# Patient Record
Sex: MALE | Race: BLACK OR AFRICAN AMERICAN | Employment: FULL TIME | URBAN - METROPOLITAN AREA
[De-identification: names, ages, dates, MRNs, and addresses within clinical notes are randomized per-mention and may not be internally consistent; named-entity substitution may affect disease eponyms.]

---

## 2017-01-11 ENCOUNTER — OFFICE VISIT (OUTPATIENT)
Dept: FAMILY MEDICINE CLINIC | Age: 57
End: 2017-01-11

## 2017-01-11 VITALS
RESPIRATION RATE: 18 BRPM | BODY MASS INDEX: 25.43 KG/M2 | SYSTOLIC BLOOD PRESSURE: 134 MMHG | HEIGHT: 66 IN | WEIGHT: 158.2 LBS | DIASTOLIC BLOOD PRESSURE: 85 MMHG | TEMPERATURE: 97.8 F | HEART RATE: 85 BPM

## 2017-01-11 DIAGNOSIS — R51.9 GENERALIZED HEADACHES: Primary | ICD-10-CM

## 2017-01-11 DIAGNOSIS — Z23 ENCOUNTER FOR IMMUNIZATION: ICD-10-CM

## 2017-01-11 RX ORDER — BUTALBITAL, ASPIRIN, AND CAFFEINE 325; 50; 40 MG/1; MG/1; MG/1
1 CAPSULE ORAL
Qty: 45 CAP | Refills: 0 | Status: SHIPPED | OUTPATIENT
Start: 2017-01-11 | End: 2017-09-28 | Stop reason: SDUPTHER

## 2017-01-11 NOTE — PROGRESS NOTES
Reviewed two most recent MRI results:  Please advise pt that a repeat MRI is not indicated at this time, if symptoms have changed then pt needs to be evaluated by neurology and an MRI will be ordered at the discretion of the specialist.     Please inform

## 2017-01-11 NOTE — MR AVS SNAPSHOT
Visit Information Date & Time Provider Department Dept. Phone Encounter #  
 1/11/2017 10:30 AM Tawny Gallegos MD [address] [facility] [phone] [id number] Upcoming Health Maintenance Date Due Hepatitis C Screening 1960 Pneumococcal 19-64 Highest Risk (1 of 3 - PCV13) 3/16/1978 DTaP/Tdap/Td series (1 - Tdap) 3/16/1980 FOBT Q 1 YEAR AGE 50-75 3/16/2009 INFLUENZA AGE 9 TO ADULT 8/1/2016 Allergies as of 1/11/2017  Review Complete On: 1/11/2017 By: Tawny Gallegos MD  
 No Known Allergies Current Immunizations  Reviewed on 11/3/2015 Name Date Influenza Vaccine Nuzhat Miu) 11/3/2015 Influenza Vaccine (Quad) PF  Incomplete Zoster Vaccine, Live 11/3/2015 Not reviewed this visit You Were Diagnosed With   
  
 Codes Comments Generalized headaches    -  Primary ICD-10-CM: Y07 ICD-9-CM: 784.0 Encounter for immunization     ICD-10-CM: S08 ICD-9-CM: V03.89 Vitals BP Pulse Temp Resp Height(growth percentile) Weight(growth percentile) 134/85 (BP 1 Location: Left arm, BP Patient Position: Sitting) 85 97.8 °F (36.6 °C) (Oral) 18 5' 6\" (1.676 m) 158 lb 3.2 oz (71.8 kg) BMI Smoking Status 25.53 kg/m2 Never Smoker Vitals History BMI and BSA Data Body Mass Index Body Surface Area 25.53 kg/m 2 1.83 m 2 Preferred Pharmacy Pharmacy Name Phone [id number] [facility] [address] [facility] [phone] Your Updated Medication List  
  
   
This list is accurate as of: 1/11/17 11:14 AM.  Always use your most recent med list.  
  
  
  
  
 butalbital-aspirin-caffeine capsule Commonly known as:  [place] Ronny Take 1 Cap by mouth every four (4) hours as needed for Pain. Max Daily Amount: 6 Caps.  
  
 losartan-hydroCHLOROthiazide 100-25 mg per tablet Commonly known as:  HYZAAR  
TAKE 1 TABLET BY MOUTH EVERY DAY  
  
 montelukast 10 mg tablet Commonly known as:  SINGULAIR Take 1 Tab by mouth daily. Prescriptions Printed Refills  
 butalbital-aspirin-caffeine (FIORINAL) capsule 0 Sig: Take 1 Cap by mouth every four (4) hours as needed for Pain. Max Daily Amount: 6 Caps. Class: Print Route: Oral  
  
We Performed the Following INFLUENZA VIRUS VAC QUAD,SPLIT,PRESV FREE SYRINGE 3/> YRS IM [id number] CPT(R)] RI IMMUNIZ ADMIN,1 SINGLE/COMB VAC/TOXOID [id number] CPT(R)] Introducing [facility] & [facility] SERVICES! Fermin Crenshaw introduces [employer] patient portal. Now you can access parts of your medical record, email your doctor's office, and request medication refills online. 1. In your internet browser, go to [email or web address] 2. Click on the First Time User? Click Here link in the Sign In box. You will see the New Member Sign Up page. 3. Enter your SimulScribe Access Code exactly as it appears below. You will not need to use this code after youve completed the sign-up process. If you do not sign up before the expiration date, you must request a new code. · SimulScribe Access Code: [email or web address] Expires: 4/11/2017 11:14 AM 
 
4. Enter the last four digits of your Social Security Number (xxxx) and Date of Birth (mm/dd/yyyy) as indicated and click Submit. You will be taken to the next sign-up page. 5. Create a SimulScribe ID. This will be your SimulScribe login ID and cannot be changed, so think of one that is secure and easy to remember. 6. Create a SimulScribe password. You can change your password at any time. 7. Enter your Password Reset Question and Answer. This can be used at a later time if you forget your password. 8. Enter your e-mail address. You will receive e-mail notification when new information is available in [address] 9. Click Sign Up. You can now view and download portions of your medical record. 10. Click the Download Summary menu link to download a portable copy of your medical information. If you have questions, please visit the Frequently Asked Questions section of the Enomal[employer] website. Remember, Suso is NOT to be used for urgent needs. For medical emergencies, dial 911. Now available from your iPhone and Android! Please provide this summary of care documentation to your next provider. Your primary care clinician is listed as Kevin Durham. If you have any questions after today's visit, please call [phone].

## 2017-01-11 NOTE — PROGRESS NOTES
Chief Complaint   Patient presents with    Headache    Immunization/Injection     Patient in office today with c/o of headaches lasting 6-8 days. Denies nausea/ vomiting. Patient requesting flu vaccination. 1. Have you been to the ER, urgent care clinic since your last visit? Hospitalized since your last visit? No    2. Have you seen or consulted any other health care providers outside of the [address] [place] [address] since your last visit? Include any pap smears or colon screening.  No

## 2017-01-11 NOTE — PROGRESS NOTES
Chief Complaint   Patient presents with    Headache    Immunization/Injection     Patient in office today with c/o of headaches lasting 6-8 days. Denies nausea/ vomiting. Patient requesting flu vaccination. Pt would like to repeat an MRI of his Head since headaches have returned, Pt reports that he had not had a headache for almost a year. Pt used to see neurology, reports that previous MRI was at [facility]. Subjective: (As above and below)     Chief Complaint   Patient presents with    Headache    Immunization/Injection     he is a 61y.o. year old male who presents for evaluation. Reviewed PmHx, RxHx, FmHx, SocHx, AllgHx and updated in chart. Review of Systems - negative except as listed above    Objective:     Vitals:    01/11/17 1044   BP: 134/85   Pulse: 85   Resp: 18   Temp: 97.8 °F (36.6 °C)   TempSrc: Oral   Weight: 158 lb 3.2 oz (71.8 kg)   Height: 5' 6\" (1.676 m)     Physical Examination: General appearance - alert, well appearing, and in no distress  Mental status - normal mood, behavior, speech, dress, motor activity, and thought processes  Eyes - pupils equal and reactive, extraocular eye movements intact  Mouth - mucous membranes moist, pharynx normal without lesions  Chest - clear to auscultation, no wheezes, rales or rhonchi, symmetric air entry  Heart - normal rate, regular rhythm, normal S1, S2, no murmurs, rubs, clicks or gallops  Neurological - cranial nerves II through XII intact, motor and sensory grossly normal bilaterally, normal muscle tone, no tremors, strength 5/5    Assessment/ Plan:   1. Generalized headaches  -refill medication for as needed use  -requested records of previous imaging    2.  Encounter for immunization  - Influenza virus vaccine (QUADRIVALENT PRES FREE SYRINGE) IM 3 years and older  - IA IMMUNIZ ADMIN,1 SINGLE/COMB VAC/TOXOID     Follow-up Disposition: As needed  I have discussed the diagnosis with the patient and the intended plan as seen in the above orders. The patient has received an after-visit summary and questions were answered concerning future plans.      Medication Side Effects and Warnings were discussed with patient: yes  Patient Labs were reviewed: yes  Patient Past Records were reviewed:  yes    Zachary Negron M.D.

## 2017-02-16 RX ORDER — LOSARTAN POTASSIUM AND HYDROCHLOROTHIAZIDE 25; 100 MG/1; MG/1
TABLET ORAL
Qty: 30 TAB | Refills: 2 | Status: SHIPPED | OUTPATIENT
Start: 2017-02-16 | End: 2017-03-20 | Stop reason: SDUPTHER

## 2017-03-20 RX ORDER — LOSARTAN POTASSIUM AND HYDROCHLOROTHIAZIDE 25; 100 MG/1; MG/1
TABLET ORAL
Qty: 90 TAB | Refills: 3 | Status: SHIPPED | OUTPATIENT
Start: 2017-03-20 | End: 2018-03-04 | Stop reason: SDUPTHER

## 2017-06-15 ENCOUNTER — OFFICE VISIT (OUTPATIENT)
Dept: FAMILY MEDICINE CLINIC | Age: 57
End: 2017-06-15

## 2017-06-15 VITALS
DIASTOLIC BLOOD PRESSURE: 96 MMHG | BODY MASS INDEX: 23.46 KG/M2 | OXYGEN SATURATION: 98 % | HEIGHT: 66 IN | HEART RATE: 75 BPM | TEMPERATURE: 98.1 F | RESPIRATION RATE: 18 BRPM | SYSTOLIC BLOOD PRESSURE: 158 MMHG | WEIGHT: 146 LBS

## 2017-06-15 DIAGNOSIS — I10 ESSENTIAL HYPERTENSION: Primary | ICD-10-CM

## 2017-06-15 DIAGNOSIS — R73.9 ELEVATED BLOOD SUGAR: ICD-10-CM

## 2017-06-15 RX ORDER — AMLODIPINE BESYLATE 10 MG/1
10 TABLET ORAL DAILY
Qty: 30 TAB | Refills: 5 | Status: SHIPPED | OUTPATIENT
Start: 2017-06-15 | End: 2017-08-02 | Stop reason: SDUPTHER

## 2017-06-15 NOTE — MR AVS SNAPSHOT
Visit Information Date & Time Provider Department Dept. Phone Encounter #  
 6/15/2017  7:45 AM Catherine Ellison MD [address] [facility] [phone] [id number] Upcoming Health Maintenance Date Due Hepatitis C Screening 1960 Pneumococcal 19-64 Highest Risk (1 of 3 - PCV13) 3/16/1978 DTaP/Tdap/Td series (1 - Tdap) 3/16/1980 INFLUENZA AGE 9 TO ADULT 8/1/2017 COLONOSCOPY 6/15/2019 Allergies as of 6/15/2017  Review Complete On: 6/15/2017 By: Catherine Ellison MD  
 No Known Allergies Current Immunizations  Reviewed on 1/11/2017 Name Date Influenza Vaccine Worthy Caroline) 11/3/2015 Influenza Vaccine (Quad) PF 1/11/2017 Zoster Vaccine, Live 11/3/2015 Not reviewed this visit You Were Diagnosed With   
  
 Codes Comments Essential hypertension    -  Primary ICD-10-CM: I10 
ICD-9-CM: 401.9 Elevated blood sugar     ICD-10-CM: R73.9 ICD-9-CM: 790.29 Vitals BP Pulse Temp Resp Height(growth percentile) Weight(growth percentile) (!) 158/96 (BP 1 Location: Left arm, BP Patient Position: Sitting) 75 98.1 °F (36.7 °C) (Oral) 18 5' 6\" (1.676 m) 146 lb (66.2 kg) SpO2 BMI Smoking Status 98% 23.57 kg/m2 Never Smoker Vitals History BMI and BSA Data Body Mass Index Body Surface Area  
 23.57 kg/m 2 1.76 m 2 Preferred Pharmacy Pharmacy Name Phone [id number], [address] [place] [phone] Your Updated Medication List  
  
   
This list is accurate as of: 6/15/17  8:05 AM.  Always use your most recent med list. amLODIPine 10 mg tablet Commonly known as:  Amy Million Take 1 Tab by mouth daily. butalbital-aspirin-caffeine capsule Commonly known as:  Bennye John Take 1 Cap by mouth every four (4) hours as needed for Pain. Max Daily Amount: 6 Caps.  
  
 losartan-hydroCHLOROthiazide 100-25 mg per tablet Commonly known as:  HYZAAR  
 TAKE 1 TABLET BY MOUTH EVERY DAY  
  
 montelukast 10 mg tablet Commonly known as:  SINGULAIR Take 1 Tab by mouth daily. Prescriptions Sent to Pharmacy Refills  
 amLODIPine (NORVASC) 10 mg tablet 5 Sig: Take 1 Tab by mouth daily. Class: Normal  
 Pharmacy: [facility] [address] Ph #: [phone] Route: Oral  
  
We Performed the Following CBC WITH AUTOMATED DIFF [91337 CPT(R)] HEMOGLOBIN A1C WITH EAG [46913 CPT(R)] HEPATITIS C AB [48239 CPT(R)] LIPID PANEL [87081 CPT(R)] METABOLIC PANEL, COMPREHENSIVE [73378 CPT(R)] TSH 3RD GENERATION [46004 CPT(R)] Introducing [facility] & HEALTH SERVICES! Tali Cullen introduces [employer] patient portal. Now you can access parts of your medical record, email your doctor's office, and request medication refills online. 1. In your internet browser, go to [email or web address] 2. Click on the First Time User? Click Here link in the Sign In box. You will see the New Member Sign Up page. 3. Enter your [employer] Access Code exactly as it appears below. You will not need to use this code after youve completed the sign-up process. If you do not sign up before the expiration date, you must request a new code. · [employer] Access Code: [email or web address] Expires: 9/13/2017  8:05 AM 
 
4. Enter the last four digits of your Social Security Number (xxxx) and Date of Birth (mm/dd/yyyy) as indicated and click Submit. You will be taken to the next sign-up page. 5. Create a [employer] ID. This will be your [employer] login ID and cannot be changed, so think of one that is secure and easy to remember. 6. Create a [employer] password. You can change your password at any time. 7. Enter your Password Reset Question and Answer. This can be used at a later time if you forget your password. 8. Enter your e-mail address.  You will receive e-mail notification when new information is available in Dotspin. 9. Click Sign Up. You can now view and download portions of your medical record. 10. Click the Download Summary menu link to download a portable copy of your medical information. If you have questions, please visit the Frequently Asked Questions section of the Dotspin website. Remember, Dotspin is NOT to be used for urgent needs. For medical emergencies, dial 911. Now available from your iPhone and Android! Please provide this summary of care documentation to your next provider. Your primary care clinician is listed as Kevin Durham. If you have any questions after today's visit, please call [phone].

## 2017-06-15 NOTE — PROGRESS NOTES
Pt here for BP check/medication evaluation. Reports that BP has been elevated x 2-3 weeks. States that he has been taking losartan-hctz daily. Subjective: (As above and below)     Chief Complaint   Patient presents with    Medication Evaluation     he is a 61y.o. year old male who presents for evaluation. Reviewed PmHx, RxHx, FmHx, SocHx, AllgHx and updated in chart. Review of Systems - negative except as listed above    Objective:     Vitals:    06/15/17 0743   BP: (!) 158/96   Pulse: 75   Resp: 18   Temp: 98.1 °F (36.7 °C)   TempSrc: Oral   SpO2: 98%   Weight: 146 lb (66.2 kg)   Height: 5' 6\" (1.676 m)     Physical Examination: General appearance - alert, well appearing, and in no distress  Mental status - normal mood, behavior, speech, dress, motor activity, and thought processes  Eyes - pupils equal and reactive, extraocular eye movements intact  Mouth - mucous membranes moist, pharynx normal without lesions  Chest - clear to auscultation, no wheezes, rales or rhonchi, symmetric air entry  Heart - normal rate, regular rhythm, normal S1, S2, no murmurs, rubs, clicks or gallops  Musculoskeletal - no joint tenderness, deformity or swelling    Assessment/ Plan:   1. Essential hypertension  -add amlodipine to losartan-HCTZ    2. Elevated blood sugar  -check fasting labs  - CBC WITH AUTOMATED DIFF  - LIPID PANEL  - METABOLIC PANEL, COMPREHENSIVE  - TSH 3RD GENERATION  - HEMOGLOBIN A1C WITH EAG     Follow-up Disposition: As needed  I have discussed the diagnosis with the patient and the intended plan as seen in the above orders. The patient has received an after-visit summary and questions were answered concerning future plans.      Medication Side Effects and Warnings were discussed with patient: yes  Patient Labs were reviewed: yes  Patient Past Records were reviewed:  yes    Boone Paulino M.D.

## 2017-06-15 NOTE — PROGRESS NOTES
Pt here for BP check/medication evaluation. Reports that BP has been elevated x 2-3 weeks. States that he has been taking losartan-hctz daily.

## 2017-06-16 LAB
ALBUMIN SERPL-MCNC: 4.3 G/DL (ref 3.5–5.5)
ALBUMIN/GLOB SERPL: 1.7 {RATIO} (ref 1.2–2.2)
ALP SERPL-CCNC: 91 IU/L (ref 39–117)
ALT SERPL-CCNC: 13 IU/L (ref 0–44)
AST SERPL-CCNC: 19 IU/L (ref 0–40)
BASOPHILS # BLD AUTO: 0 X10E3/UL (ref 0–0.2)
BASOPHILS NFR BLD AUTO: 0 %
BILIRUB SERPL-MCNC: 0.3 MG/DL (ref 0–1.2)
BUN SERPL-MCNC: 15 MG/DL (ref 6–24)
BUN/CREAT SERPL: 14 (ref 9–20)
CALCIUM SERPL-MCNC: 9.1 MG/DL (ref 8.7–10.2)
CHLORIDE SERPL-SCNC: 100 MMOL/L (ref 96–106)
CHOLEST SERPL-MCNC: 172 MG/DL (ref 100–199)
CO2 SERPL-SCNC: 26 MMOL/L (ref 18–29)
CREAT SERPL-MCNC: 1.11 MG/DL (ref 0.76–1.27)
EOSINOPHIL # BLD AUTO: 0.1 X10E3/UL (ref 0–0.4)
EOSINOPHIL NFR BLD AUTO: 2 %
ERYTHROCYTE [DISTWIDTH] IN BLOOD BY AUTOMATED COUNT: 14.7 % (ref 12.3–15.4)
EST. AVERAGE GLUCOSE BLD GHB EST-MCNC: 123 MG/DL
GLOBULIN SER CALC-MCNC: 2.5 G/DL (ref 1.5–4.5)
GLUCOSE SERPL-MCNC: 107 MG/DL (ref 65–99)
HBA1C MFR BLD: 5.9 % (ref 4.8–5.6)
HCT VFR BLD AUTO: 44.7 % (ref 37.5–51)
HCV AB S/CO SERPL IA: <0.1 S/CO RATIO (ref 0–0.9)
HDLC SERPL-MCNC: 62 MG/DL
HGB BLD-MCNC: 15.2 G/DL (ref 12.6–17.7)
IMM GRANULOCYTES # BLD: 0 X10E3/UL (ref 0–0.1)
IMM GRANULOCYTES NFR BLD: 0 %
INTERPRETATION, 910389: NORMAL
LDLC SERPL CALC-MCNC: 99 MG/DL (ref 0–99)
LYMPHOCYTES # BLD AUTO: 1.2 X10E3/UL (ref 0.7–3.1)
LYMPHOCYTES NFR BLD AUTO: 31 %
MCH RBC QN AUTO: 30 PG (ref 26.6–33)
MCHC RBC AUTO-ENTMCNC: 34 G/DL (ref 31.5–35.7)
MCV RBC AUTO: 88 FL (ref 79–97)
MONOCYTES # BLD AUTO: 0.3 X10E3/UL (ref 0.1–0.9)
MONOCYTES NFR BLD AUTO: 7 %
NEUTROPHILS # BLD AUTO: 2.4 X10E3/UL (ref 1.4–7)
NEUTROPHILS NFR BLD AUTO: 60 %
PLATELET # BLD AUTO: 255 X10E3/UL (ref 150–379)
POTASSIUM SERPL-SCNC: 4 MMOL/L (ref 3.5–5.2)
PROT SERPL-MCNC: 6.8 G/DL (ref 6–8.5)
RBC # BLD AUTO: 5.06 X10E6/UL (ref 4.14–5.8)
SODIUM SERPL-SCNC: 142 MMOL/L (ref 134–144)
TRIGL SERPL-MCNC: 55 MG/DL (ref 0–149)
TSH SERPL DL<=0.005 MIU/L-ACNC: 3.65 UIU/ML (ref 0.45–4.5)
VLDLC SERPL CALC-MCNC: 11 MG/DL (ref 5–40)
WBC # BLD AUTO: 4 X10E3/UL (ref 3.4–10.8)

## 2017-06-16 NOTE — PROGRESS NOTES
Increase in risk for diabetes, please closely monitor diet and increase exercise   Improved from last check one year ago. All other labs are within normal limits.    Please inform

## 2017-06-29 ENCOUNTER — OFFICE VISIT (OUTPATIENT)
Dept: FAMILY MEDICINE CLINIC | Age: 57
End: 2017-06-29

## 2017-06-29 VITALS
WEIGHT: 156.4 LBS | BODY MASS INDEX: 25.13 KG/M2 | TEMPERATURE: 98.4 F | SYSTOLIC BLOOD PRESSURE: 116 MMHG | OXYGEN SATURATION: 99 % | RESPIRATION RATE: 20 BRPM | HEIGHT: 66 IN | HEART RATE: 82 BPM | DIASTOLIC BLOOD PRESSURE: 80 MMHG

## 2017-06-29 DIAGNOSIS — G47.9 DIFFICULTY SLEEPING: ICD-10-CM

## 2017-06-29 DIAGNOSIS — I10 ESSENTIAL HYPERTENSION: Primary | ICD-10-CM

## 2017-06-29 NOTE — MR AVS SNAPSHOT
Visit Information Date & Time Provider Department Dept. Phone Encounter #  
 6/29/2017 11:10 AM Winferd Bloch, MD [address] [facility] [phone] [id number] Upcoming Health Maintenance Date Due Pneumococcal 19-64 Highest Risk (1 of 3 - PCV13) 3/16/1978 DTaP/Tdap/Td series (1 - Tdap) 3/16/1980 INFLUENZA AGE 9 TO ADULT 8/1/2017 COLONOSCOPY 6/15/2019 Allergies as of 6/29/2017  Review Complete On: 6/29/2017 By: Winferd Bloch, MD  
 No Known Allergies Current Immunizations  Reviewed on 1/11/2017 Name Date Influenza Vaccine Rogersononiel Hopkins) 11/3/2015 Influenza Vaccine (Quad) PF 1/11/2017 Zoster Vaccine, Live 11/3/2015 Not reviewed this visit You Were Diagnosed With   
  
 Codes Comments Essential hypertension    -  Primary ICD-10-CM: I10 
ICD-9-CM: 401.9 Difficulty sleeping     ICD-10-CM: G47.9 ICD-9-CM: 780.50 Vitals BP Pulse Temp Resp Height(growth percentile) Weight(growth percentile) 116/80 (BP 1 Location: Left arm, BP Patient Position: Sitting) 82 98.4 °F (36.9 °C) (Oral) 20 5' 6\" (1.676 m) 156 lb 6.4 oz (70.9 kg) SpO2 BMI Smoking Status 99% 25.24 kg/m2 Never Smoker Vitals History BMI and BSA Data Body Mass Index Body Surface Area  
 25.24 kg/m 2 1.82 m 2 Preferred Pharmacy Pharmacy Name Phone [id number] [place] [address] [facility] [phone] Your Updated Medication List  
  
   
This list is accurate as of: 6/29/17 11:41 AM.  Always use your most recent med list. amLODIPine 10 mg tablet Commonly known as:  Corinne Lees Take 1 Tab by mouth daily. butalbital-aspirin-caffeine capsule Commonly known as:  Donnal Amo Take 1 Cap by mouth every four (4) hours as needed for Pain. Max Daily Amount: 6 Caps.  
  
 losartan-hydroCHLOROthiazide 100-25 mg per tablet Commonly known as:  HYZAAR  
TAKE 1 TABLET BY MOUTH EVERY DAY  
  
 Introducing [facility] & HEALTH SERVICES! Jacy Kaufman introduces [employer] patient portal. Now you can access parts of your medical record, email your doctor's office, and request medication refills online. 1. In your internet browser, go to [email or web address] 2. Click on the First Time User? Click Here link in the Sign In box. You will see the New Member Sign Up page. 3. Enter your Z[employer] Access Code exactly as it appears below. You will not need to use this code after youve completed the sign-up process. If you do not sign up before the expiration date, you must request a new code. · [employer] Access Code: [email or web address] Expires: 9/13/2017  8:05 AM 
 
4. Enter the last four digits of your Social Security Number (xxxx) and Date of Birth (mm/dd/yyyy) as indicated and click Submit. You will be taken to the next sign-up page. 5. Create a ZoopShop ID. This will be your [employer] login ID and cannot be changed, so think of one that is secure and easy to remember. 6. Create a ZoopShop password. You can change your password at any time. 7. Enter your Password Reset Question and Answer. This can be used at a later time if you forget your password. 8. Enter your e-mail address. You will receive e-mail notification when new information is available in [address] 9. Click Sign Up. You can now view and download portions of your medical record. 10. Click the Download Summary menu link to download a portable copy of your medical information. If you have questions, please visit the Frequently Asked Questions section of the ZoopShop website. Remember, ZoopShop is NOT to be used for urgent needs. For medical emergencies, dial 911. Now available from your iPhone and Android! Please provide this summary of care documentation to your next provider. Your primary care clinician is listed as Kevin Durham. If you have any questions after today's visit, please call [phone].

## 2017-06-29 NOTE — PROGRESS NOTES
Chief Complaint   Patient presents with    Hypertension     2 week f/u    Diabetes      Patient seen in the office for DM and HTN 2 week follow up.

## 2017-06-29 NOTE — PROGRESS NOTES
Chief Complaint   Patient presents with    Hypertension     2 week f/u    Diabetes      Patient seen in the office for DM and HTN 2 week follow up. Pt reports that he has had difficulty sleeping, used to be on medication, does not want to be on medication now. Pt has had a sleep study. Subjective: (As above and below)     Chief Complaint   Patient presents with    Hypertension     2 week f/u    Diabetes     he is a 61y.o. year old male who presents for evaluation. Reviewed PmHx, RxHx, FmHx, SocHx, AllgHx and updated in chart. Review of Systems - negative except as listed above    Objective:     Vitals:    06/29/17 1116   BP: 116/80   Pulse: 82   Resp: 20   Temp: 98.4 °F (36.9 °C)   TempSrc: Oral   SpO2: 99%   Weight: 156 lb 6.4 oz (70.9 kg)   Height: 5' 6\" (1.676 m)     Physical Examination: General appearance - alert, well appearing, and in no distress  Mental status - normal mood, behavior, speech, dress, motor activity, and thought processes  Eyes - pupils equal and reactive, extraocular eye movements intact  Mouth - mucous membranes moist, pharynx normal without lesions  Chest - clear to auscultation, no wheezes, rales or rhonchi, symmetric air entry  Heart - normal rate, regular rhythm, normal S1, S2, no murmurs, rubs, clicks or gallops    Assessment/ Plan:   1. Essential hypertension  -greatly improved, continue on current medication    2. Difficulty sleeping  -pt plans to visit a sleep center in [facility] for a free consultation     Follow-up Disposition: As needed  I have discussed the diagnosis with the patient and the intended plan as seen in the above orders. The patient has received an after-visit summary and questions were answered concerning future plans.      Medication Side Effects and Warnings were discussed with patient: yes  Patient Labs were reviewed: yes  Patient Past Records were reviewed:  yes    Klaudia Segundo M.D.

## 2017-08-02 RX ORDER — AMLODIPINE BESYLATE 10 MG/1
10 TABLET ORAL DAILY
Qty: 90 TAB | Refills: 3 | Status: SHIPPED | OUTPATIENT
Start: 2017-08-02 | End: 2018-07-26 | Stop reason: SDUPTHER

## 2017-10-11 ENCOUNTER — OFFICE VISIT (OUTPATIENT)
Dept: FAMILY MEDICINE CLINIC | Age: 57
End: 2017-10-11

## 2017-10-11 VITALS
HEIGHT: 66 IN | TEMPERATURE: 98.5 F | BODY MASS INDEX: 24.91 KG/M2 | RESPIRATION RATE: 18 BRPM | DIASTOLIC BLOOD PRESSURE: 88 MMHG | OXYGEN SATURATION: 99 % | WEIGHT: 155 LBS | SYSTOLIC BLOOD PRESSURE: 134 MMHG | HEART RATE: 88 BPM

## 2017-10-11 DIAGNOSIS — J06.9 UPPER RESPIRATORY TRACT INFECTION, UNSPECIFIED TYPE: Primary | ICD-10-CM

## 2017-10-11 DIAGNOSIS — Z23 ENCOUNTER FOR IMMUNIZATION: ICD-10-CM

## 2017-10-11 RX ORDER — AZITHROMYCIN 250 MG/1
TABLET, FILM COATED ORAL
Qty: 6 TAB | Refills: 0 | Status: SHIPPED | OUTPATIENT
Start: 2017-10-11 | End: 2017-11-08 | Stop reason: SDUPTHER

## 2017-10-11 RX ORDER — CODEINE PHOSPHATE AND GUAIFENESIN 10; 100 MG/5ML; MG/5ML
5 SOLUTION ORAL
Qty: 180 ML | Refills: 0 | Status: SHIPPED | OUTPATIENT
Start: 2017-10-11 | End: 2018-06-22

## 2017-10-11 NOTE — PROGRESS NOTES
Pt here c/o ongoing cough and congestion x 5 days. Reports cough has been non productive. Pt has been taking Theraflu and Mucinex OTC.

## 2017-10-11 NOTE — MR AVS SNAPSHOT
Visit Information Date & Time Provider Department Dept. Phone Encounter #  
 10/11/2017  9:00 AM Sandhya Orozco MD [address] [facility] [phone] [id number] Upcoming Health Maintenance Date Due Pneumococcal 19-64 Highest Risk (1 of 3 - PCV13) 3/16/1978 DTaP/Tdap/Td series (1 - Tdap) 3/16/1980 INFLUENZA AGE 9 TO ADULT 8/1/2017 COLONOSCOPY 6/15/2019 Allergies as of 10/11/2017  Review Complete On: 10/11/2017 By: Sandhya Orozco MD  
 No Known Allergies Current Immunizations  Reviewed on 1/11/2017 Name Date Influenza Vaccine Seretha Cave) 11/3/2015 Influenza Vaccine (Quad) PF  Incomplete, 1/11/2017 Zoster Vaccine, Live 11/3/2015 Not reviewed this visit You Were Diagnosed With   
  
 Codes Comments Upper respiratory tract infection, unspecified type    -  Primary ICD-10-CM: J06.9 ICD-9-CM: 465.9 Encounter for immunization     ICD-10-CM: R45 ICD-9-CM: V03.89 Vitals BP Pulse Temp Resp Height(growth percentile) Weight(growth percentile) 134/88 (BP 1 Location: Right arm, BP Patient Position: Sitting) 88 98.5 °F (36.9 °C) (Oral) 18 5' 6\" (1.676 m) 155 lb (70.3 kg) SpO2 BMI Smoking Status 99% 25.02 kg/m2 Never Smoker Vitals History BMI and BSA Data Body Mass Index Body Surface Area 25.02 kg/m 2 1.81 m 2 Preferred Pharmacy Pharmacy Name Phone [id number], [facility] [phone] Your Updated Medication List  
  
   
This list is accurate as of: 10/11/17  9:20 AM.  Always use your most recent med list. amLODIPine 10 mg tablet Commonly known as:  Arlyss Glendive Take 1 Tab by mouth daily. azithromycin 250 mg tablet Commonly known as:  Kathi Ringgold Please take as directed  
  
 butalbital-aspirin-caffeine capsule Commonly known as:  FIORINAL  
TAKE ONE CAPSULE BY MOUTH EVERY 4 HOURS AS NEEDED FOR PAIN  
  
 guaiFENesin-codeine 100-10 mg/5 mL solution Commonly known as:  ROBITUSSIN AC Take 5 mL by mouth three (3) times daily as needed for Cough. Max Daily Amount: 15 mL. losartan-hydroCHLOROthiazide 100-25 mg per tablet Commonly known as:  HYZAAR  
TAKE 1 TABLET BY MOUTH EVERY DAY Prescriptions Printed Refills  
 guaiFENesin-codeine (ROBITUSSIN AC) 100-10 mg/5 mL solution 0 Sig: Take 5 mL by mouth three (3) times daily as needed for Cough. Max Daily Amount: 15 mL. Class: Print Route: Oral  
  
Prescriptions Sent to Pharmacy Refills  
 azithromycin (ZITHROMAX) 250 mg tablet 0 Sig: Please take as directed Class: Normal  
 Pharmacy: [address], [address] Ph #: [phone] We Performed the Following INFLUENZA VIRUS VAC QUAD,SPLIT,PRESV FREE SYRINGE IM [id number] CPT(R)] AL IMMUNIZ ADMIN,1 SINGLE/COMB VAC/TOXOID [id number] CPT(R)] Introducing [facility] & HEALTH SERVICES! [employer] Brandon introduces Hello Agent patient portal. Now you can access parts of your medical record, email your doctor's office, and request medication refills online. 1. In your internet browser, go to [email or web address] 2. Click on the First Time User? Click Here link in the Sign In box. You will see the New Member Sign Up page. 3. Enter your Hello Agent Access Code exactly as it appears below. You will not need to use this code after youve completed the sign-up process. If you do not sign up before the expiration date, you must request a new code. · Hello Agent Access Code: [email or web address] Expires: 1/9/2018  9:20 AM 
 
4. Enter the last four digits of your Social Security Number (xxxx) and Date of Birth (mm/dd/yyyy) as indicated and click Submit. You will be taken to the next sign-up page. 5. Create a Hello Agent ID. This will be your Hello Agent login ID and cannot be changed, so think of one that is secure and easy to remember. 6. Create a Gousto password. You can change your password at any time. 7. Enter your Password Reset Question and Answer. This can be used at a later time if you forget your password. 8. Enter your e-mail address. You will receive e-mail notification when new information is available in [address] 9. Click Sign Up. You can now view and download portions of your medical record. 10. Click the Download Summary menu link to download a portable copy of your medical information. If you have questions, please visit the Frequently Asked Questions section of the Gousto website. Remember, Gousto is NOT to be used for urgent needs. For medical emergencies, dial 911. Now available from your iPhone and Android! Please provide this summary of care documentation to your next provider. Your primary care clinician is listed as Kevin Durham. If you have any questions after today's visit, please call [phone].

## 2017-10-11 NOTE — PROGRESS NOTES
Subjective:   Jesus Ibarra is a 61 y.o. male who complains of congestion, dry cough and generalized sinus pain for 5 days, gradually worsening since that time. He denies a history of shortness of breath and wheezing. Evaluation to date: none. Treatment to date: OTC products. Patient does not smoke cigarettes. Relevant PMH: No pertinent additional PMH. Patient Active Problem List   Diagnosis Code    [facility]    Essential hypertension I10     No Known Allergies     Review of Systems  Pertinent items are noted in HPI. Objective:     Visit Vitals    /88 (BP 1 Location: Right arm, BP Patient Position: Sitting)    Pulse 88    Temp 98.5 °F (36.9 °C) (Oral)    Resp 18    Ht 5' 6\" (1.676 m)    Wt 155 lb (70.3 kg)    SpO2 99%    BMI 25.02 kg/m2     General:  alert, cooperative, no distress   Eyes: conjunctivae/corneas clear. PERRL, EOM's intact. Fundi benign   Ears: normal TM's and external ear canals AU   Sinuses: tenderness over generalized maxillary   Mouth:  Lips, mucosa, and tongue normal. Teeth and gums normal   Neck: supple, symmetrical, trachea midline and no adenopathy. Heart: S1 and S2 normal, no murmurs noted. Lungs: wheezes R apex   Abdomen: soft, non-tender. Bowel sounds normal. No masses,  no organomegaly        Assessment/Plan:   viral upper respiratory illness  Suggested symptomatic OTC remedies. Antibiotics per orders. RTC prn. ICD-10-CM ICD-9-CM    1. Upper respiratory tract infection, unspecified type J06.9 465.9    2. Encounter for immunization Z23 V03.89 INFLUENZA VIRUS VAC QUAD,SPLIT,PRESV FREE SYRINGE IM      IN IMMUNIZ ADMIN,1 SINGLE/COMB VAC/TOXOID     Encounter Diagnoses   Name Primary?     Upper respiratory tract infection, unspecified type Yes    Encounter for immunization      Orders Placed This Encounter    IN IMMUNIZ ADMIN,1 SINGLE/COMB VAC/TOXOID    Influenza virus vaccine (QUADRIVALENT PRES FREE SYRINGE) IM (87660)    azithromycin (ZITHROMAX) 250 mg tablet    guaiFENesin-codeine (ROBITUSSIN AC) 100-10 mg/5 mL solution   .

## 2017-11-08 RX ORDER — AZITHROMYCIN 250 MG/1
TABLET, FILM COATED ORAL
Qty: 6 TAB | Refills: 0 | Status: SHIPPED | OUTPATIENT
Start: 2017-11-08 | End: 2018-06-07 | Stop reason: ALTCHOICE

## 2017-12-26 ENCOUNTER — OFFICE VISIT (OUTPATIENT)
Dept: FAMILY MEDICINE CLINIC | Age: 57
End: 2017-12-26

## 2017-12-26 VITALS
RESPIRATION RATE: 12 BRPM | OXYGEN SATURATION: 96 % | SYSTOLIC BLOOD PRESSURE: 125 MMHG | WEIGHT: 159 LBS | DIASTOLIC BLOOD PRESSURE: 79 MMHG | HEIGHT: 66 IN | BODY MASS INDEX: 25.55 KG/M2 | TEMPERATURE: 97.8 F | HEART RATE: 68 BPM

## 2017-12-26 DIAGNOSIS — Z00.00 ROUTINE GENERAL MEDICAL EXAMINATION AT A HEALTH CARE FACILITY: ICD-10-CM

## 2017-12-26 DIAGNOSIS — Z23 ENCOUNTER FOR IMMUNIZATION: ICD-10-CM

## 2017-12-26 DIAGNOSIS — I10 ESSENTIAL HYPERTENSION: ICD-10-CM

## 2017-12-26 DIAGNOSIS — C61 PROSTATE CANCER (HCC): Primary | ICD-10-CM

## 2017-12-26 DIAGNOSIS — R06.83 SNORES: ICD-10-CM

## 2017-12-26 NOTE — MR AVS SNAPSHOT
Visit Information Date & Time Provider Department Dept. Phone Encounter #  
 12/26/2017  8:00 AM James Gao MD [address] [facility] [phone] [id number] Upcoming Health Maintenance Date Due Pneumococcal 19-64 Highest Risk (1 of 3 - PCV13) 3/16/1978 DTaP/Tdap/Td series (1 - Tdap) 3/16/1980 COLONOSCOPY 6/15/2019 Allergies as of 12/26/2017  Review Complete On: 12/26/2017 By: James Gao MD  
 No Known Allergies Current Immunizations  Reviewed on 10/11/2017 Name Date Influenza Vaccine Alyssa Bible) 11/3/2015 Influenza Vaccine (Quad) PF 10/11/2017, 1/11/2017 Tdap  Incomplete Zoster Vaccine, Live 11/3/2015 Not reviewed this visit You Were Diagnosed With   
  
 Codes Comments Prostate cancer [facility])    -  Primary ICD-10-CM: P56 ICD-9-CM: 537 Essential hypertension     ICD-10-CM: I10 
ICD-9-CM: 401.9 Routine general medical examination at a health care facility     ICD-10-CM: Z00.00 ICD-9-CM: V70.0 Encounter for immunization     ICD-10-CM: S37 ICD-9-CM: V03.89 Snores     ICD-10-CM: R06.83 
ICD-9-CM: 786.09 Vitals BP Pulse Temp Resp Height(growth percentile) Weight(growth percentile) 125/79 68 97.8 °F (36.6 °C) 12 5' 6\" (1.676 m) 159 lb (72.1 kg) SpO2 BMI Smoking Status 96% 25.66 kg/m2 Never Smoker Vitals History BMI and BSA Data Body Mass Index Body Surface Area  
 25.66 kg/m 2 1.83 m 2 Preferred Pharmacy Pharmacy Name Phone [id number] [address] [address] [phone] Your Updated Medication List  
  
   
This list is accurate as of: 12/26/17  8:44 AM.  Always use your most recent med list. amLODIPine 10 mg tablet Commonly known as:  Suzy Nye Take 1 Tab by mouth daily. azithromycin 250 mg tablet Commonly known as:  Yousif Feliciano PLEASE TAKE AS DIRECTED  
  
 butalbital-aspirin-caffeine capsule Commonly known as:  FIORINAL  
TAKE ONE CAPSULE BY MOUTH EVERY 4 HOURS AS NEEDED FOR PAIN  
  
 guaiFENesin-codeine 100-10 mg/5 mL solution Commonly known as:  ROBITUSSIN AC Take 5 mL by mouth three (3) times daily as needed for Cough. Max Daily Amount: 15 mL. losartan-hydroCHLOROthiazide 100-25 mg per tablet Commonly known as:  HYZAAR  
TAKE 1 TABLET BY MOUTH EVERY DAY We Performed the Following CBC WITH AUTOMATED DIFF [79522 CPT(R)] HEMOGLOBIN A1C WITH EAG [66948 CPT(R)] LIPID PANEL [49858 CPT(R)] METABOLIC PANEL, COMPREHENSIVE [75950 CPT(R)] [place] IMMUNIZ ADMIN,1 SINGLE/COMB VAC/TOXOID [id number] CPT(R)] REFERRAL TO SLEEP STUDIES [REF99 Custom] TETANUS, DIPHTHERIA TOXOIDS AND ACELLULAR PERTUSSIS VACCINE (TDAP), IN INDIVIDS. >=7, IM [id number] CPT(R)] TSH 3RD GENERATION [92149 CPT(R)] VITAMIN D, 25 HYDROXY [id number] CPT(R)] Referral Information Referral ID Referred By Referred To  
  
 [id number] Ladarius SILVA Not Available Visits Status Start Date End Date 1 New Request 12/26/17 12/26/18 If your referral has a status of pending review or denied, additional information will be sent to support the outcome of this decision. Introducing [facility] & HEALTH SERVICES! [employer] introduces MobiCart patient portal. Now you can access parts of your medical record, email your doctor's office, and request medication refills online. 1. In your internet browser, go to [email or web address] 2. Click on the First Time User? Click Here link in the Sign In box. You will see the New Member Sign Up page. 3. Enter your MobiCart Access Code exactly as it appears below. You will not need to use this code after youve completed the sign-up process. If you do not sign up before the expiration date, you must request a new code. · MobiCart Access Code: [email or web address] Expires: 1/9/2018  8:20 AM 
 
 4. Enter the last four digits of your Social Security Number (xxxx) and Date of Birth (mm/dd/yyyy) as indicated and click Submit. You will be taken to the next sign-up page. 5. Create a Inventalator ID. This will be your Inventalator login ID and cannot be changed, so think of one that is secure and easy to remember. 6. Create a Inventalator password. You can change your password at any time. 7. Enter your Password Reset Question and Answer. This can be used at a later time if you forget your password. 8. Enter your e-mail address. You will receive e-mail notification when new information is available in [address] 9. Click Sign Up. You can now view and download portions of your medical record. 10. Click the Download Summary menu link to download a portable copy of your medical information. If you have questions, please visit the Frequently Asked Questions section of the Inventalator website. Remember, Inventalator is NOT to be used for urgent needs. For medical emergencies, dial 911. Now available from your iPhone and Android! Please provide this summary of care documentation to your next provider. Your primary care clinician is listed as Kevin Durham. If you have any questions after today's visit, please call [phone].

## 2017-12-26 NOTE — PROGRESS NOTES
Chief Complaint   Patient presents with    Complete Physical    Labs      Pt is fasting   PSA checked two weeks ago by specialist.     Pt reports difficulty sleeping, both falling and staying asleep. Pt reports that he does not feel tired. He is able to complete his job. Pt reports that he has had trouble sleeping for over 20 years. Pt was previously diagnosed with SAURABH. Subjective: (As above and below)     Chief Complaint   Patient presents with    Complete Physical    Labs     he is a 61y.o. year old male who presents for evaluation. Reviewed PmHx, RxHx, FmHx, SocHx, AllgHx and updated in chart. Review of Systems - negative except as listed above    Objective:     Vitals:    12/26/17 0808   BP: 125/79   Pulse: 68   Resp: 12   Temp: 97.8 °F (36.6 °C)   SpO2: 96%   Weight: 159 lb (72.1 kg)   Height: 5' 6\" (1.676 m)     Physical Examination: General appearance - alert, well appearing, and in no distress  Mental status - normal mood, behavior, speech, dress, motor activity, and thought processes  Mouth - mucous membranes moist, pharynx normal without lesions  Chest - clear to auscultation, no wheezes, rales or rhonchi, symmetric air entry  Heart - normal rate, regular rhythm, normal S1, S2, no murmurs, rubs, clicks or gallops  Musculoskeletal - no joint tenderness, deformity or swelling  Extremities - peripheral pulses normal, no pedal edema, no clubbing or cyanosis    Assessment/ Plan:   1. Prostate cancer ([facility]  -keep regular follow up as schedule    2. Essential hypertension  -well controlled  - CBC WITH AUTOMATED DIFF  - TSH 3RD GENERATION    3. Routine general medical examination at a health care facility  - LIPID PANEL  - METABOLIC PANEL, COMPREHENSIVE  - VITAMIN D, 25 HYDROXY  - HEMOGLOBIN A1C WITH EAG    4. Encounter for immunization  - Tetanus, diphtheria toxoids and acellular pertussis (TDAP) vaccine, in individuals >=7 years, IM  - [place] IMMUNIZ ADMIN,1 SINGLE/COMB VAC/TOXOID    5.  Snores  -refer for eval  - REFERRAL TO SLEEP STUDIES     Follow-up Disposition: As needed  I have discussed the diagnosis with the patient and the intended plan as seen in the above orders. The patient has received an after-visit summary and questions were answered concerning future plans.      Medication Side Effects and Warnings were discussed with patient: yes  Patient Labs were reviewed: yes  Patient Past Records were reviewed:  yes    Ez Walton M.D.

## 2017-12-26 NOTE — LETTER
12/27/2017 11:38 AM 
 
Mr. Alfonso Rosenbaum [address] [id number] Dear Alfonso Rosenbaum: 
 
Please find your most recent results below. Resulted Orders CBC WITH AUTOMATED DIFF Result Value Ref Range WBC 4.2 3.4 - 10.8 x10E3/uL  
 RBC 5.06 4.14 - 5.80 x10E6/uL HGB 15.2 13.0 - 17.7 g/dL HCT 44.8 37.5 - 51.0 % MCV 89 79 - 97 fL  
 MCH 30.0 26.6 - 33.0 pg  
 MCHC 33.9 31.5 - 35.7 g/dL  
 RDW 14.0 12.3 - 15.4 % PLATELET 966 030 - 828 x10E3/uL NEUTROPHILS 64 Not Estab. % Lymphocytes 28 Not Estab. % MONOCYTES 6 Not Estab. % EOSINOPHILS 2 Not Estab. % BASOPHILS 0 Not Estab. %  
 ABS. NEUTROPHILS 2.6 1.4 - 7.0 x10E3/uL Abs Lymphocytes 1.2 0.7 - 3.1 x10E3/uL  
 ABS. MONOCYTES 0.2 0.1 - 0.9 x10E3/uL  
 ABS. EOSINOPHILS 0.1 0.0 - 0.4 x10E3/uL  
 ABS. BASOPHILS 0.0 0.0 - 0.2 x10E3/uL IMMATURE GRANULOCYTES 0 Not Estab. %  
 ABS. IMM. GRANS. 0.0 0.0 - 0.1 x10E3/uL Narrative Performed at:  [address]  [id number] : Giovanni Hayden MD, Phone:  [phone] LIPID PANEL Result Value Ref Range Cholesterol, total 188 100 - 199 mg/dL Triglyceride 63 0 - 149 mg/dL HDL Cholesterol 65 >39 mg/dL VLDL, calculated 13 5 - 40 mg/dL LDL, calculated 110 (H) 0 - 99 mg/dL Narrative Performed at:  [address]  [id number] : Giovanni Hayden MD, Phone:  [phone] METABOLIC PANEL, COMPREHENSIVE Result Value Ref Range Glucose 105 (H) 65 - 99 mg/dL BUN 18 6 - 24 mg/dL Creatinine 1.19 0.76 - 1.27 mg/dL GFR est non-AA 67 >59 mL/min/1.73 GFR est AA 77 >59 mL/min/1.73  
 BUN/Creatinine ratio 15 9 - 20 Sodium 141 134 - 144 mmol/L Potassium 4.2 3.5 - 5.2 mmol/L Chloride 99 96 - 106 mmol/L  
 CO2 25 18 - 29 mmol/L Calcium 9.2 8.7 - 10.2 mg/dL Protein, total 6.7 6.0 - 8.5 g/dL Albumin 4.3 3.5 - 5.5 g/dL GLOBULIN, TOTAL 2.4 1.5 - 4.5 g/dL A-G Ratio 1.8 1.2 - 2.2 Bilirubin, total 0.3 0.0 - 1.2 mg/dL Alk. phosphatase 95 39 - 117 IU/L  
 AST (SGOT) 19 0 - 40 IU/L  
 ALT (SGPT) 17 0 - 44 IU/L Narrative Performed at:  [address]  [id number] : Devora Cornell MD, Phone:  [phone] TSH 3RD GENERATION Result Value Ref Range TSH 2.700 0.450 - 4.500 uIU/mL Narrative Performed at:  [address]  [id number] : Devora Cornell MD, Phone:  [phone] VITAMIN D, 25 HYDROXY Result Value Ref Range VITAMIN D, 25-HYDROXY 27.8 (L) 30.0 - 100.0 ng/mL Comment:  
   Vitamin D deficiency has been defined by the [address] [place] [address] practice guideline as a 
level of serum 25-OH vitamin D less than 20 ng/mL (1,2). The Endocrine Society went on to further define vitamin D 
insufficiency as a level between 21 and 29 ng/mL (2). 1. IOM ([place] of Medicine). 2010. Dietary reference 
   intakes for calcium and D. [address] [place] [address]: The 
   Clutch. 2. Karsten MF, Dwayne NC, Irvin TORRES, et al. 
   Evaluation, treatment, and prevention of vitamin D 
   deficiency: an Endocrine Society clinical practice 
   guideline. JCEM. 2011 Jul; 96(7):1911-30. Narrative Performed at:  [address]  [id number] : Devora Cornell MD, Phone:  [phone] HEMOGLOBIN A1C WITH EAG Result Value Ref Range Hemoglobin A1c 5.8 (H) 4.8 - 5.6 % Comment:  
            Pre-diabetes: 5.7 - 6.4 Diabetes: >6.4 Glycemic control for adults with diabetes: <7.0 Estimated average glucose 120 mg/dL Narrative Performed at:  [address]  [id number] : Devora Cornell MD, Phone:  [phone] CVD REPORT  
 Result Value Ref Range INTERPRETATION Note Comment:  
   Supplemental report is available. Narrative Performed at:  [address] [place] [address]  [id number] : Jesús Gibson PhD, Phone:  [phone] RECOMMENDATIONS: 
Your cholesterol is elevated, please closely monitor diet and increase exercise, recheck in 6 months. Vitamin D is slightly low, please take a daily supplement of at least 2000 IU (international units) daily. Increase in risk for diabetes, please closely monitor diet and increase exercise All other labs are within normal limits. Please call me if you have any questions: [phone] Sincerely, Tawny Gallegos MD

## 2017-12-27 LAB
25(OH)D3+25(OH)D2 SERPL-MCNC: 27.8 NG/ML (ref 30–100)
ALBUMIN SERPL-MCNC: 4.3 G/DL (ref 3.5–5.5)
ALBUMIN/GLOB SERPL: 1.8 {RATIO} (ref 1.2–2.2)
ALP SERPL-CCNC: 95 IU/L (ref 39–117)
ALT SERPL-CCNC: 17 IU/L (ref 0–44)
AST SERPL-CCNC: 19 IU/L (ref 0–40)
BASOPHILS # BLD AUTO: 0 X10E3/UL (ref 0–0.2)
BASOPHILS NFR BLD AUTO: 0 %
BILIRUB SERPL-MCNC: 0.3 MG/DL (ref 0–1.2)
BUN SERPL-MCNC: 18 MG/DL (ref 6–24)
BUN/CREAT SERPL: 15 (ref 9–20)
CALCIUM SERPL-MCNC: 9.2 MG/DL (ref 8.7–10.2)
CHLORIDE SERPL-SCNC: 99 MMOL/L (ref 96–106)
CHOLEST SERPL-MCNC: 188 MG/DL (ref 100–199)
CO2 SERPL-SCNC: 25 MMOL/L (ref 18–29)
CREAT SERPL-MCNC: 1.19 MG/DL (ref 0.76–1.27)
EOSINOPHIL # BLD AUTO: 0.1 X10E3/UL (ref 0–0.4)
EOSINOPHIL NFR BLD AUTO: 2 %
ERYTHROCYTE [DISTWIDTH] IN BLOOD BY AUTOMATED COUNT: 14 % (ref 12.3–15.4)
EST. AVERAGE GLUCOSE BLD GHB EST-MCNC: 120 MG/DL
GLOBULIN SER CALC-MCNC: 2.4 G/DL (ref 1.5–4.5)
GLUCOSE SERPL-MCNC: 105 MG/DL (ref 65–99)
HBA1C MFR BLD: 5.8 % (ref 4.8–5.6)
HCT VFR BLD AUTO: 44.8 % (ref 37.5–51)
HDLC SERPL-MCNC: 65 MG/DL
HGB BLD-MCNC: 15.2 G/DL (ref 13–17.7)
IMM GRANULOCYTES # BLD: 0 X10E3/UL (ref 0–0.1)
IMM GRANULOCYTES NFR BLD: 0 %
INTERPRETATION, 910389: NORMAL
LDLC SERPL CALC-MCNC: 110 MG/DL (ref 0–99)
LYMPHOCYTES # BLD AUTO: 1.2 X10E3/UL (ref 0.7–3.1)
LYMPHOCYTES NFR BLD AUTO: 28 %
MCH RBC QN AUTO: 30 PG (ref 26.6–33)
MCHC RBC AUTO-ENTMCNC: 33.9 G/DL (ref 31.5–35.7)
MCV RBC AUTO: 89 FL (ref 79–97)
MONOCYTES # BLD AUTO: 0.2 X10E3/UL (ref 0.1–0.9)
MONOCYTES NFR BLD AUTO: 6 %
NEUTROPHILS # BLD AUTO: 2.6 X10E3/UL (ref 1.4–7)
NEUTROPHILS NFR BLD AUTO: 64 %
PLATELET # BLD AUTO: 298 X10E3/UL (ref 150–379)
POTASSIUM SERPL-SCNC: 4.2 MMOL/L (ref 3.5–5.2)
PROT SERPL-MCNC: 6.7 G/DL (ref 6–8.5)
RBC # BLD AUTO: 5.06 X10E6/UL (ref 4.14–5.8)
SODIUM SERPL-SCNC: 141 MMOL/L (ref 134–144)
TRIGL SERPL-MCNC: 63 MG/DL (ref 0–149)
TSH SERPL DL<=0.005 MIU/L-ACNC: 2.7 UIU/ML (ref 0.45–4.5)
VLDLC SERPL CALC-MCNC: 13 MG/DL (ref 5–40)
WBC # BLD AUTO: 4.2 X10E3/UL (ref 3.4–10.8)

## 2017-12-27 NOTE — PROGRESS NOTES
Cholesterol is elevated, please closely monitor diet and increase exercise, recheck in 6 months. Vitamin D is slightly low, please take a daily supplement of at least 2000 IU (international units) daily. Increase in risk for diabetes, please closely monitor diet and increase exercise   All other labs are within normal limits.    Please inform

## 2018-03-05 RX ORDER — LOSARTAN POTASSIUM AND HYDROCHLOROTHIAZIDE 25; 100 MG/1; MG/1
TABLET ORAL
Qty: 90 TAB | Refills: 3 | Status: SHIPPED | OUTPATIENT
Start: 2018-03-05 | End: 2019-03-05 | Stop reason: SDUPTHER

## 2018-06-07 ENCOUNTER — OFFICE VISIT (OUTPATIENT)
Dept: FAMILY MEDICINE CLINIC | Age: 58
End: 2018-06-07

## 2018-06-07 VITALS
BODY MASS INDEX: 24.75 KG/M2 | SYSTOLIC BLOOD PRESSURE: 120 MMHG | HEIGHT: 66 IN | WEIGHT: 154 LBS | HEART RATE: 78 BPM | TEMPERATURE: 97.7 F | OXYGEN SATURATION: 97 % | DIASTOLIC BLOOD PRESSURE: 80 MMHG | RESPIRATION RATE: 19 BRPM

## 2018-06-07 DIAGNOSIS — I10 ESSENTIAL HYPERTENSION: Primary | ICD-10-CM

## 2018-06-07 DIAGNOSIS — G62.9 NEUROPATHY: ICD-10-CM

## 2018-06-07 RX ORDER — GABAPENTIN 300 MG/1
300 CAPSULE ORAL 2 TIMES DAILY
Qty: 60 CAP | Refills: 2 | Status: SHIPPED | OUTPATIENT
Start: 2018-06-07 | End: 2018-06-22

## 2018-06-07 RX ORDER — MULTIVIT-MIN/FOLIC/VIT K/LYCOP 400-300MCG
TABLET ORAL
COMMUNITY

## 2018-06-07 NOTE — PROGRESS NOTES
Chief Complaint   Patient presents with    Tingling     Bilateral foot and hand      Pt seen in the office today for c/o of bilateral foot and hand pain x's \"months ago\"  Has progressively gotten worse over the past 2-3 weeks, with sharp pains    Pt reports that pain started after radiation for prostate cancer. Subjective: (As above and below)     Chief Complaint   Patient presents with    Tingling     Bilateral foot and hand      he is a 60y.o. year old male who presents for evaluation. Reviewed PmHx, RxHx, FmHx, SocHx, AllgHx and updated in chart. Review of Systems - negative except as listed above    Objective:     Vitals:    06/07/18 1105   BP: 120/80   Pulse: 78   Resp: 19   Temp: 97.7 °F (36.5 °C)   TempSrc: Oral   SpO2: 97%   Weight: 154 lb (69.9 kg)   Height: 5' 6\" (1.676 m)     Physical Examination: General appearance - alert, well appearing, and in no distress  Mental status - normal mood, behavior, speech, dress, motor activity, and thought processes  Mouth - mucous membranes moist, pharynx normal without lesions  Chest - clear to auscultation, no wheezes, rales or rhonchi, symmetric air entry  Heart - normal rate, regular rhythm, normal S1, S2, no murmurs, rubs, clicks or gallops  Neurological - motor and sensory grossly normal bilaterally  Musculoskeletal - no joint tenderness, deformity or swelling    Assessment/ Plan:   1. Essential hypertension  -well controlled    2. Neuropathy  -start on gabapentin to help with pain in feet. - gabapentin (NEURONTIN) 300 mg capsule; Take 1 Cap by mouth two (2) times a day. Dispense: 60 Cap; Refill: 2     Follow-up Disposition: As needed  I have discussed the diagnosis with the patient and the intended plan as seen in the above orders. The patient has received an after-visit summary and questions were answered concerning future plans.      Medication Side Effects and Warnings were discussed with patient: yes  Patient Labs were reviewed: yes  Patient Past Records were reviewed:  yes    Joseph Reza M.D.

## 2018-06-07 NOTE — PATIENT INSTRUCTIONS
Neuropathic Pain: Care Instructions  Your Care Instructions    Neuropathic pain is caused by pressure on or damage to your nerves. It's often simply called nerve pain. Some people feel this type of pain all the time. For others, it comes and goes. Diabetes, shingles, or an injury can cause nerve pain. Many people say the pain feels sharp, burning, or stabbing. But some people feel it as a dull ache. In some cases, it makes your skin very sensitive. So touch, pressure, and other sensations that did not hurt before may now cause pain. It's important to know that this kind of pain is real and can affect your quality of life. It's also important to know that treatment can help. Treatment includes pain medicines, exercise, and physical therapy. Medicines can help reduce the number of pain signals that travel over the nerves. This can make the painful areas less sensitive. It can also help you sleep better and improve your mood. But medicines are only one part of successful treatment. Most people do best with more than one kind of treatment. Your doctor may recommend that you try cognitive-behavioral therapy and stress management. Or, if needed, you may decide to try to quit smoking, lower your blood pressure, or better control blood sugar. These kinds of healthy changes can also make a difference. If you feel that your treatment is not working, talk to your doctor. And be sure to tell your doctor if you think you might be depressed or anxious. These are common problems that can also be treated. Follow-up care is a key part of your treatment and safety. Be sure to make and go to all appointments, and call your doctor if you are having problems. It's also a good idea to know your test results and keep a list of the medicines you take. How can you care for yourself at home? · Be safe with medicines. Read and follow all instructions on the label.   ¨ If the doctor gave you a prescription medicine for pain, take it as prescribed. ¨ If you are not taking a prescription pain medicine, ask your doctor if you can take an over-the-counter medicine. · Save hard tasks for days when you have less pain. Follow a hard task with an easy task. And remember to take breaks. · Relax, and reduce stress. You may want to try deep breathing or meditation. These can help. · Keep moving. Gentle, daily exercise can help reduce pain. Your doctor or physical therapist can tell you what type of exercise is best for you. This may include walking, swimming, and stationary biking. It may also include stretches and range-of-motion exercises. · Try heat, cold packs, and massage. · Get enough sleep. Constant pain can make you more tired. If the pain makes it hard to sleep, talk with your doctor. · Think positively. Your thoughts can affect your pain. Do fun things to distract yourself from the pain. See a movie, read a book, listen to music, or spend time with a friend. · Keep a pain diary. Try to write down how strong your pain is and what it feels like. Also try to notice and write down how your moods, thoughts, sleep, activities, and medicine affect your pain. These notes can help you and your doctor find the best ways to treat your pain. Reducing constipation caused by pain medicine  Pain medicines often cause constipation. To reduce constipation:  · Include fruits, vegetables, beans, and whole grains in your diet each day. These foods are high in fiber. · Drink plenty of fluids, enough so that your urine is light yellow or clear like water. If you have kidney, heart, or liver disease and have to limit fluids, talk with your doctor before you increase the amount of fluids you drink. · Get some exercise every day. Build up slowly to 30 to 60 minutes a day on 5 or more days of the week. · Take a fiber supplement, such as Citrucel or Metamucil, every day if needed. Read and follow all instructions on the label.   · Schedule time each day for a bowel movement. Having a daily routine may help. Take your time and do not strain when having a bowel movement. · Ask your doctor about a laxative. The goal is to have one easy bowel movement every 1 to 2 days. Do not let constipation go untreated for more than 3 days. When should you call for help? Call your doctor now or seek immediate medical care if:  ? · You feel sad, anxious, or hopeless for more than a few days. This could mean you are depressed. Depression is common in people who have a lot of pain. But it can be treated. ? · You have trouble with bowel movements, such as:  ¨ No bowel movement in 3 days. ¨ Blood in the anal area, in your stool, or on the toilet paper. ¨ Diarrhea for more than 24 hours. ? Watch closely for changes in your health, and be sure to contact your doctor if:  ? · Your pain is getting worse. ? · You can't sleep because of pain. ? · You are very worried or anxious about your pain. ? · You have trouble taking your pain medicine. ? · You have any concerns about your pain medicine or its side effects. ? · You have vomiting or cramps for more than 2 hours. Where can you learn more? Go to http://manjit-don.info/. Enter X584 in the search box to learn more about \"Neuropathic Pain: Care Instructions. \"  Current as of: October 14, 2016  Content Version: 11.4  © 5570-2782 [employer]. Care instructions adapted under license by [employer] (which disclaims liability or warranty for this information). If you have questions about a medical condition or this instruction, always ask your healthcare professional. [address] any warranty or liability for your use of this information.

## 2018-06-07 NOTE — MR AVS SNAPSHOT
[address] [place] [address] 
[phone] Patient: Armand Quinonez MRN: [id number] ALIRIO:1/92/1856 Visit Information Date & Time Provider Department Dept. Phone Encounter #  
 6/7/2018 10:40 AM Aung Andrew MD [facility] [facility] [phone] [id number] Upcoming Health Maintenance Date Due Pneumococcal 19-64 Highest Risk (1 of 3 - PCV13) 3/16/1978 Influenza Age 5 to Adult 8/1/2018 COLONOSCOPY 6/15/2019 DTaP/Tdap/Td series (2 - Td) 12/26/2027 Allergies as of 6/7/2018  Review Complete On: 6/7/2018 By: uAng Andrew MD  
 No Known Allergies Current Immunizations  Reviewed on 12/26/2017 Name Date Influenza Vaccine Haven Sallies) 11/3/2015 Influenza Vaccine (Quad) PF 10/11/2017, 1/11/2017 Tdap 12/26/2017 Zoster Vaccine, Live 11/3/2015 Not reviewed this visit You Were Diagnosed With   
  
 Codes Comments Essential hypertension    -  Primary ICD-10-CM: I10 
ICD-9-CM: 401.9 Neuropathy     ICD-10-CM: G62.9 ICD-9-CM: 697. 9 Vitals BP Pulse Temp Resp Height(growth percentile) Weight(growth percentile) 120/80 (BP 1 Location: Left arm, BP Patient Position: Sitting) 78 97.7 °F (36.5 °C) (Oral) 19 5' 6\" (1.676 m) 154 lb (69.9 kg) SpO2 BMI Smoking Status 97% 24.86 kg/m2 Never Smoker Vitals History BMI and BSA Data Body Mass Index Body Surface Area  
 24.86 kg/m 2 1.8 m 2 Preferred Pharmacy Pharmacy Name Phone [id number] [address] [address] [facility] [phone] Your Updated Medication List  
  
   
This list is accurate as of 6/7/18 11:33 AM.  Always use your most recent med list. amLODIPine 10 mg tablet Commonly known as:  Estrellita Reza Take 1 Tab by mouth daily. butalbital-aspirin-caffeine capsule Commonly known as:  Greg Cushing  
 TAKE ONE CAPSULE BY MOUTH EVERY 4 HOURS AS NEEDED FOR PAIN  
  
 gabapentin 300 mg capsule Commonly known as:  NEURONTIN Take 1 Cap by mouth two (2) times a day. guaiFENesin-codeine 100-10 mg/5 mL solution Commonly known as:  ROBITUSSIN AC Take 5 mL by mouth three (3) times daily as needed for Cough. Max Daily Amount: 15 mL. losartan-hydroCHLOROthiazide 100-25 mg per tablet Commonly known as:  HYZAAR  
TAKE 1 TABLET BY MOUTH EVERY DAY  
  
 ONE-A-DAY MEN'S MULTIVITAMIN 400- mcg Tab Generic drug:  multivit-min-folic-vit K-lycop Take  by mouth. Prescriptions Sent to Pharmacy Refills  
 gabapentin (NEURONTIN) 300 mg capsule 2 Sig: Take 1 Cap by mouth two (2) times a day. Class: Normal  
 Pharmacy: [facility] [address]  #: [phone] Route: Oral  
  
Patient Instructions Neuropathic Pain: Care Instructions Your Care Instructions Neuropathic pain is caused by pressure on or damage to your nerves. It's often simply called nerve pain. Some people feel this type of pain all the time. For others, it comes and goes. Diabetes, shingles, or an injury can cause nerve pain. Many people say the pain feels sharp, burning, or stabbing. But some people feel it as a dull ache. In some cases, it makes your skin very sensitive. So touch, pressure, and other sensations that did not hurt before may now cause pain. It's important to know that this kind of pain is real and can affect your quality of life. It's also important to know that treatment can help. Treatment includes pain medicines, exercise, and physical therapy. Medicines can help reduce the number of pain signals that travel over the nerves. This can make the painful areas less sensitive. It can also help you sleep better and improve your mood. But medicines are only one part of successful treatment. Most people do best with more than one kind of treatment. Your doctor may recommend that you try cognitive-behavioral therapy and stress management. Or, if needed, you may decide to try to quit smoking, lower your blood pressure, or better control blood sugar. These kinds of healthy changes can also make a difference. If you feel that your treatment is not working, talk to your doctor. And be sure to tell your doctor if you think you might be depressed or anxious. These are common problems that can also be treated. Follow-up care is a key part of your treatment and safety. Be sure to make and go to all appointments, and call your doctor if you are having problems. It's also a good idea to know your test results and keep a list of the medicines you take. How can you care for yourself at home? · Be safe with medicines. Read and follow all instructions on the label. ¨ If the doctor gave you a prescription medicine for pain, take it as prescribed. ¨ If you are not taking a prescription pain medicine, ask your doctor if you can take an over-the-counter medicine. · Save hard tasks for days when you have less pain. Follow a hard task with an easy task. And remember to take breaks. · Relax, and reduce stress. You may want to try deep breathing or meditation. These can help. · Keep moving. Gentle, daily exercise can help reduce pain. Your doctor or physical therapist can tell you what type of exercise is best for you. This may include walking, swimming, and stationary biking. It may also include stretches and range-of-motion exercises. · Try heat, cold packs, and massage. · Get enough sleep. Constant pain can make you more tired. If the pain makes it hard to sleep, talk with your doctor. · Think positively. Your thoughts can affect your pain. Do fun things to distract yourself from the pain. See a movie, read a book, listen to music, or spend time with a friend. · Keep a pain diary. Try to write down how strong your pain is and what it feels like. Also try to notice and write down how your moods, thoughts, sleep, activities, and medicine affect your pain. These notes can help you and your doctor find the best ways to treat your pain. Reducing constipation caused by pain medicine Pain medicines often cause constipation. To reduce constipation: 
· Include fruits, vegetables, beans, and whole grains in your diet each day. These foods are high in fiber. · Drink plenty of fluids, enough so that your urine is light yellow or clear like water. If you have kidney, heart, or liver disease and have to limit fluids, talk with your doctor before you increase the amount of fluids you drink. · Get some exercise every day. Build up slowly to 30 to 60 minutes a day on 5 or more days of the week. · Take a fiber supplement, such as Citrucel or Metamucil, every day if needed. Read and follow all instructions on the label. · Schedule time each day for a bowel movement. Having a daily routine may help. Take your time and do not strain when having a bowel movement. · Ask your doctor about a laxative. The goal is to have one easy bowel movement every 1 to 2 days. Do not let constipation go untreated for more than 3 days. When should you call for help? Call your doctor now or seek immediate medical care if: 
? · You feel sad, anxious, or hopeless for more than a few days. This could mean you are depressed. Depression is common in people who have a lot of pain. But it can be treated. ? · You have trouble with bowel movements, such as: 
¨ No bowel movement in 3 days. ¨ Blood in the anal area, in your stool, or on the toilet paper. ¨ Diarrhea for more than 24 hours. ? Watch closely for changes in your health, and be sure to contact your doctor if: 
? · Your pain is getting worse. ? · You can't sleep because of pain. ? · You are very worried or anxious about your pain. ? · You have trouble taking your pain medicine. ? · You have any concerns about your pain medicine or its side effects. ? · You have vomiting or cramps for more than 2 hours. Where can you learn more? Go to http://manjit-odn.info/. Enter Z645 in the search box to learn more about \"Neuropathic Pain: Care Instructions. \" Current as of: October 14, 2016 Content Version: 11.4 © 8667-0636 [employer]Transfer. Care instructions adapted under license by [facility] [employer] (which disclaims liability or warranty for this information). If you have questions about a medical condition or this instruction, always ask your healthcare professional. [address] any warranty or liability for your use of this information. Patient Instructions History Introducing [facility] & HEALTH SERVICES! [employer] introduces [employer] patient portal. Now you can access parts of your medical record, email your doctor's office, and request medication refills online. 1. In your internet browser, go to [email or web address] 2. Click on the First Time User? Click Here link in the Sign In box. You will see the New Member Sign Up page. 3. Enter your Tr[employer] Access Code exactly as it appears below. You will not need to use this code after youve completed the sign-up process. If you do not sign up before the expiration date, you must request a new code. · [employer] Access Code: [email or web address] Expires: 9/5/2018 11:33 AM 
 
4. Enter the last four digits of your Social Security Number (xxxx) and Date of Birth (mm/dd/yyyy) as indicated and click Submit. You will be taken to the next sign-up page. 5. Create a Tristar ID. This will be your Tristar login ID and cannot be changed, so think of one that is secure and easy to remember. 6. Create a Tristar password. You can change your password at any time. 7. Enter your Password Reset Question and Answer. This can be used at a later time if you forget your password. 8. Enter your e-mail address. You will receive e-mail notification when new information is available in [address] 9. Click Sign Up. You can now view and download portions of your medical record. 10. Click the Download Summary menu link to download a portable copy of your medical information. If you have questions, please visit the Frequently Asked Questions section of the [employer] website. Remember, [employer] is NOT to be used for urgent needs. For medical emergencies, dial 911. Now available from your iPhone and Android! Please provide this summary of care documentation to your next provider. Your primary care clinician is listed as Kevin Durham. If you have any questions after today's visit, please call [phone].

## 2018-06-13 ENCOUNTER — OFFICE VISIT (OUTPATIENT)
Dept: FAMILY MEDICINE CLINIC | Age: 58
End: 2018-06-13

## 2018-06-13 VITALS
BODY MASS INDEX: 25.07 KG/M2 | HEART RATE: 87 BPM | SYSTOLIC BLOOD PRESSURE: 123 MMHG | OXYGEN SATURATION: 100 % | TEMPERATURE: 98.7 F | RESPIRATION RATE: 18 BRPM | DIASTOLIC BLOOD PRESSURE: 82 MMHG | WEIGHT: 156 LBS | HEIGHT: 66 IN

## 2018-06-13 DIAGNOSIS — S61.012A LACERATION OF LEFT THUMB WITHOUT FOREIGN BODY WITHOUT DAMAGE TO NAIL, INITIAL ENCOUNTER: Primary | ICD-10-CM

## 2018-06-13 RX ORDER — TOLTERODINE 2 MG/1
CAPSULE, EXTENDED RELEASE ORAL
Refills: 12 | COMMUNITY
Start: 2018-06-06 | End: 2019-05-15 | Stop reason: ALTCHOICE

## 2018-06-13 NOTE — MR AVS SNAPSHOT
[address] [address] 
[phone] Patient: Sergio Valle MRN: [id number] OUM:[birth year] Visit Information Date & Time Provider Department Dept. Phone Encounter #  
 6/13/2018 10:50 AM David Beaver MD 5900 [facility] [phone] [id number] Your Appointments 6/19/2018  8:50 AM  
ESTABLISHED PATIENT with Kassi Garcia DO 5900 [facility] ([facility]) Appt Note: stitch removal  
 [address] [address]  
[phone]  
  
   
 [address] [address] Upcoming Health Maintenance Date Due Pneumococcal 19-64 Highest Risk (1 of 3 - PCV13) 3/16/1978 Influenza Age 5 to Adult 8/1/2018 COLONOSCOPY 6/15/2019 DTaP/Tdap/Td series (2 - Td) 12/26/2027 Allergies as of 6/13/2018  Review Complete On: 6/13/2018 By: Prasad Butterfield LPN No Known Allergies Current Immunizations  Reviewed on 12/26/2017 Name Date Influenza Vaccine Araceli Hane) 11/3/2015 Influenza Vaccine (Quad) PF 10/11/2017, 1/11/2017 Tdap 12/26/2017 Zoster Vaccine, Live 11/3/2015 Not reviewed this visit Vitals BP Pulse Temp Resp Height(growth percentile) Weight(growth percentile) 123/82 (BP 1 Location: Left arm, BP Patient Position: Sitting) 87 98.7 °F (37.1 °C) (Oral) 18 5' 6\" (1.676 m) 156 lb (70.8 kg) SpO2 BMI Smoking Status 100% 25.18 kg/m2 Never Smoker Vitals History BMI and BSA Data Body Mass Index Body Surface Area  
 25.18 kg/m 2 1.82 m 2 Preferred Pharmacy Pharmacy Name Phone [id number] [place], [address] [phone] Your Updated Medication List  
  
   
This list is accurate as of 6/13/18 12:07 PM.  Always use your most recent med list. amLODIPine 10 mg tablet Commonly known as:  Frankie Vieira Take 1 Tab by mouth daily. butalbital-aspirin-caffeine capsule Commonly known as:  FIORINAL  
TAKE ONE CAPSULE BY MOUTH EVERY 4 HOURS AS NEEDED FOR PAIN  
  
 gabapentin 300 mg capsule Commonly known as:  NEURONTIN Take 1 Cap by mouth two (2) times a day. guaiFENesin-codeine 100-10 mg/5 mL solution Commonly known as:  ROBITUSSIN AC Take 5 mL by mouth three (3) times daily as needed for Cough. Max Daily Amount: 15 mL. losartan-hydroCHLOROthiazide 100-25 mg per tablet Commonly known as:  HYZAAR  
TAKE 1 TABLET BY MOUTH EVERY DAY  
  
 ONE-A-DAY MEN'S MULTIVITAMIN 400- mcg Tab Generic drug:  multivit-min-folic-vit K-lycop Take  by mouth. tolterodine ER 2 mg ER capsule Commonly known as:  DETROL-LA  
TAKE 1 CAPSULE BY MOUTH EVERY DAY Introducing [facility] & HEALTH SERVICES! Meghan Ang introduces [employer] patient portal. Now you can access parts of your medical record, email your doctor's office, and request medication refills online. 1. In your internet browser, go to [email or web address] 2. Click on the First Time User? Click Here link in the Sign In box. You will see the New Member Sign Up page. 3. Enter your [employer] Access Code exactly as it appears below. You will not need to use this code after youve completed the sign-up process. If you do not sign up before the expiration date, you must request a new code. · [employer] Access Code: [email or web address] Expires: 9/5/2018 11:33 AM 
 
4. Enter the last four digits of your Social Security Number (xxxx) and Date of Birth (mm/dd/yyyy) as indicated and click Submit. You will be taken to the next sign-up page. 5. Create a [employer] ID. This will be your [employer] login ID and cannot be changed, so think of one that is secure and easy to remember. 6. Create a [employer] password. You can change your password at any time. 7. Enter your Password Reset Question and Answer. This can be used at a later time if you forget your password. 8. Enter your e-mail address. You will receive e-mail notification when new information is available in [address] 9. Click Sign Up. You can now view and download portions of your medical record. 10. Click the Download Summary menu link to download a portable copy of your medical information. If you have questions, please visit the Frequently Asked Questions section of the [employer] website. Remember, [employer] is NOT to be used for urgent needs. For medical emergencies, dial 911. Now available from your iPhone and Android! Please provide this summary of care documentation to your next provider. Your primary care clinician is listed as Kevin Durham. If you have any questions after today's visit, please call [phone].

## 2018-06-19 ENCOUNTER — OFFICE VISIT (OUTPATIENT)
Dept: FAMILY MEDICINE CLINIC | Age: 58
End: 2018-06-19

## 2018-06-19 VITALS
RESPIRATION RATE: 16 BRPM | TEMPERATURE: 97.6 F | DIASTOLIC BLOOD PRESSURE: 79 MMHG | BODY MASS INDEX: 24.91 KG/M2 | OXYGEN SATURATION: 96 % | HEIGHT: 66 IN | HEART RATE: 88 BPM | WEIGHT: 155 LBS | SYSTOLIC BLOOD PRESSURE: 120 MMHG

## 2018-06-19 DIAGNOSIS — S61.012A LACERATION OF LEFT THUMB WITHOUT FOREIGN BODY WITHOUT DAMAGE TO NAIL, INITIAL ENCOUNTER: Primary | ICD-10-CM

## 2018-06-19 NOTE — PROGRESS NOTES
Graciela Gentile is a 60 y.o. male   Chief Complaint   Patient presents with    Suture Removal    pt ehre for suture removal from a L thumb laceration states he fell and cut his hand open and had approx 13 sutures placed. No pustulent drainage or surrounding erythema, pt has been keeping clean and wrapped. he is a 60y.o. year old male who presents for evalution. Reviewed PmHx, RxHx, FmHx, SocHx, AllgHx and updated and dated in the chart. Review of Systems - negative except as listed above in the HPI    Objective:     Vitals:    06/19/18 0848   BP: 120/79   Pulse: 88   Resp: 16   Temp: 97.6 °F (36.4 °C)   TempSrc: Oral   SpO2: 96%   Weight: 155 lb (70.3 kg)   Height: 5' 6\" (1.676 m)       Current Outpatient Prescriptions   Medication Sig    tolterodine ER (DETROL-LA) 2 mg ER capsule TAKE 1 CAPSULE BY MOUTH EVERY DAY    multivit-min-folic-vit K-lycop (ONE-A-DAY MEN'S MULTIVITAMIN) 400- mcg tab Take  by mouth.  gabapentin (NEURONTIN) 300 mg capsule Take 1 Cap by mouth two (2) times a day.  losartan-hydroCHLOROthiazide (HYZAAR) 100-25 mg per tablet TAKE 1 TABLET BY MOUTH EVERY DAY    amLODIPine (NORVASC) 10 mg tablet Take 1 Tab by mouth daily.  guaiFENesin-codeine (ROBITUSSIN AC) 100-10 mg/5 mL solution Take 5 mL by mouth three (3) times daily as needed for Cough. Max Daily Amount: 15 mL.  butalbital-aspirin-caffeine (FIORINAL) capsule TAKE ONE CAPSULE BY MOUTH EVERY 4 HOURS AS NEEDED FOR PAIN     No current facility-administered medications for this visit. Physical Examination: General appearance - alert, well appearing, and in no distress  Skin - 6 sutures removed then mid point oif laceration was beginning to open remaining sutures left in place bacitracin applied and wrapped      Assessment/ Plan:   Diagnoses and all orders for this visit:    1.  Laceration of left thumb without foreign body without damage to nail, initial encounter     RTC Friday to have remaining sutures removed  Follow-up Disposition:  Return if symptoms worsen or fail to improve. I have discussed the diagnosis with the patient and the intended plan as seen in the above orders. The patient has received an after-visit summary and questions were answered concerning future plans. Pt conveyed understanding of plan.     Medication Side Effects and Warnings were discussed with patient      Bryce Whitney, DO

## 2018-06-19 NOTE — MR AVS SNAPSHOT
[address] [address] 
[phone] Patient: Arpan Vincent MRN: [id number] VVH:1/31/9037 Visit Information Date & Time Provider Department Dept. Phone Encounter #  
 6/19/2018  8:50 AM Prateek Sales, Tg Peña [phone] [id number] Follow-up Instructions Return if symptoms worsen or fail to improve. Upcoming Health Maintenance Date Due Pneumococcal 19-64 Highest Risk (1 of 3 - PCV13) 3/16/1978 Influenza Age 5 to Adult 8/1/2018 COLONOSCOPY 6/15/2019 DTaP/Tdap/Td series (2 - Td) 12/26/2027 Allergies as of 6/19/2018  Review Complete On: 6/19/2018 By: Prateek Sales, DO No Known Allergies Current Immunizations  Reviewed on 12/26/2017 Name Date Influenza Vaccine Deri Mulling) 11/3/2015 Influenza Vaccine (Quad) PF 10/11/2017, 1/11/2017 Tdap 12/26/2017 Zoster Vaccine, Live 11/3/2015 Not reviewed this visit You Were Diagnosed With   
  
 Codes Comments Laceration of left thumb without foreign body without damage to nail, initial encounter    -  Primary ICD-10-CM: S89.701V ICD-9-CM: 808. 0 Vitals BP Pulse Temp Resp Height(growth percentile) Weight(growth percentile) 120/79 (BP 1 Location: Left arm, BP Patient Position: Sitting) 88 97.6 °F (36.4 °C) (Oral) 16 5' 6\" (1.676 m) 155 lb (70.3 kg) SpO2 BMI Smoking Status 96% 25.02 kg/m2 Never Smoker Vitals History BMI and BSA Data Body Mass Index Body Surface Area 25.02 kg/m 2 1.81 m 2 Preferred Pharmacy Pharmacy Name Phone [id number], [address] [facility] [phone] Your Updated Medication List  
  
   
This list is accurate as of 6/19/18  9:09 AM.  Always use your most recent med list. amLODIPine 10 mg tablet Commonly known as:  Janessa Welch Take 1 Tab by mouth daily. butalbital-aspirin-caffeine capsule Commonly known as:  FIORINAL  
TAKE ONE CAPSULE BY MOUTH EVERY 4 HOURS AS NEEDED FOR PAIN  
  
 gabapentin 300 mg capsule Commonly known as:  NEURONTIN Take 1 Cap by mouth two (2) times a day. guaiFENesin-codeine 100-10 mg/5 mL solution Commonly known as:  ROBITUSSIN AC Take 5 mL by mouth three (3) times daily as needed for Cough. Max Daily Amount: 15 mL. losartan-hydroCHLOROthiazide 100-25 mg per tablet Commonly known as:  HYZAAR  
TAKE 1 TABLET BY MOUTH EVERY DAY  
  
 ONE-A-DAY MEN'S MULTIVITAMIN 400- mcg Tab Generic drug:  multivit-min-folic-vit K-lycop Take  by mouth. tolterodine ER 2 mg ER capsule Commonly known as:  DETROL-LA  
TAKE 1 CAPSULE BY MOUTH EVERY DAY Follow-up Instructions Return if symptoms worsen or fail to improve. Patient Instructions Cuts Closed With Stitches: Care Instructions Your Care Instructions A cut can happen anywhere on your body. The doctor used stitches to close the cut. Using stitches also helps the cut heal and reduces scarring. Sometimes pieces of tape called Steri-Strips are put over the stitches. If the cut went deep and through the skin, the doctor may have put in two layers of stitches. The deeper layer brings the deep part of the cut together. These stitches will dissolve and don't need to be removed. The stitches in the upper layer are the ones you see on the cut. You will probably have a bandage over the stitches. You will need to have the stitches removed, usually in 7 to 14 days. The doctor has checked you carefully, but problems can develop later. If you notice any problems or new symptoms, get medical treatment right away. Follow-up care is a key part of your treatment and safety. Be sure to make and go to all appointments, and call your doctor if you are having problems.  It's also a good idea to know your test results and keep a list of the medicines you take. How can you care for yourself at home? · Keep the cut dry for the first 24 to 48 hours. After this, you can shower if your doctor okays it. Pat the cut dry. · Don't soak the cut, such as in a bathtub. Your doctor will tell you when it's safe to get the cut wet. · If your doctor told you how to care for your cut, follow your doctor's instructions. If you did not get instructions, follow this general advice: ¨ After the first 24 to 48 hours, wash around the cut with clean water 2 times a day. Don't use hydrogen peroxide or alcohol, which can slow healing. ¨ You may cover the cut with a thin layer of petroleum jelly, such as Vaseline, and a nonstick bandage. ¨ Apply more petroleum jelly and replace the bandage as needed. · Prop up the sore area on a pillow anytime you sit or lie down during the next 3 days. Try to keep it above the level of your heart. This will help reduce swelling. · Avoid any activity that could cause your cut to reopen. · Do not remove the stitches on your own. Your doctor will tell you when to come back to have the stitches removed. · Leave Steri-Strips on until they fall off. · Be safe with medicines. Read and follow all instructions on the label. ¨ If the doctor gave you a prescription medicine for pain, take it as prescribed. ¨ If you are not taking a prescription pain medicine, ask your doctor if you can take an over-the-counter medicine. When should you call for help? Call your doctor now or seek immediate medical care if: 
? · You have new pain, or your pain gets worse. ? · The skin near the cut is cold or pale or changes color. ? · You have tingling, weakness, or numbness near the cut.  
? · The cut starts to bleed, and blood soaks through the bandage. Oozing small amounts of blood is normal.  
? · You have trouble moving the area near the cut.  
? · You have symptoms of infection, such as: ¨ Increased pain, swelling, warmth, or redness around the cut. ¨ Red streaks leading from the cut. ¨ Pus draining from the cut. ¨ A fever. ? Watch closely for changes in your health, and be sure to contact your doctor if: 
? · The cut reopens. ? · You do not get better as expected. Where can you learn more? Go to http://manjit-don.info/. Enter R217 in the search box to learn more about \"Cuts Closed With Stitches: Care Instructions. \" Current as of: March 20, 2017 Content Version: 11.4 © 5403-9604 [employer]. Care instructions adapted under license by "[employer] (which disclaims liability or warranty for this information). If you have questions about a medical condition or this instruction, always ask your healthcare professional. [address] any warranty or liability for your use of this information. Introducing [facility]! Weston Jarvis introduces [employer] patient portal. Now you can access parts of your medical record, email your doctor's office, and request medication refills online. 1. In your internet browser, go to [email or web address] 2. Click on the First Time User? Click Here link in the Sign In box. You will see the New Member Sign Up page. 3. Enter your [employer] Access Code exactly as it appears below. You will not need to use this code after youve completed the sign-up process. If you do not sign up before the expiration date, you must request a new code. · [employer] Access Code: [email or web address] Expires: 9/5/2018 11:33 AM 
 
4. Enter the last four digits of your Social Security Number (xxxx) and Date of Birth (mm/dd/yyyy) as indicated and click Submit. You will be taken to the next sign-up page. 5. Create a [employer] ID. This will be your [employer] login ID and cannot be changed, so think of one that is secure and easy to remember. 6. Create a [employer] password. You can change your password at any time. 7. Enter your Password Reset Question and Answer. This can be used at a later time if you forget your password. 8. Enter your e-mail address. You will receive e-mail notification when new information is available in [address] 9. Click Sign Up. You can now view and download portions of your medical record. 10. Click the Download Summary menu link to download a portable copy of your medical information. If you have questions, please visit the Frequently Asked Questions section of the [employer] website. Remember, [employer] is NOT to be used for urgent needs. For medical emergencies, dial 911. Now available from your iPhone and Android! Please provide this summary of care documentation to your next provider. Your primary care clinician is listed as Kevin Durham. If you have any questions after today's visit, please call [phone].

## 2018-06-19 NOTE — PROGRESS NOTES
Identified Patient with two Patient identifiers (Name and ). Two Patient Identifiers confirmed. Reviewed record in preparation for visit and have obtained necessary documentation. Chief Complaint   Patient presents with    Suture Removal       Visit Vitals    /79 (BP 1 Location: Left arm, BP Patient Position: Sitting)    Pulse 88    Temp 97.6 °F (36.4 °C) (Oral)    Resp 16    Ht 5' 6\" (1.676 m)    Wt 155 lb (70.3 kg)    SpO2 96%    BMI 25.02 kg/m2       1. Have you been to the ER, urgent care clinic since your last visit? Hospitalized since your last visit? No    2. Have you seen or consulted any other health care providers outside of the [address] [place] [address] since your last visit? Include any pap smears or colon screening.  No

## 2018-06-19 NOTE — PATIENT INSTRUCTIONS
Cuts Closed With Stitches: Care Instructions  Your Care Instructions  A cut can happen anywhere on your body. The doctor used stitches to close the cut. Using stitches also helps the cut heal and reduces scarring. Sometimes pieces of tape called Steri-Strips are put over the stitches. If the cut went deep and through the skin, the doctor may have put in two layers of stitches. The deeper layer brings the deep part of the cut together. These stitches will dissolve and don't need to be removed. The stitches in the upper layer are the ones you see on the cut. You will probably have a bandage over the stitches. You will need to have the stitches removed, usually in 7 to 14 days. The doctor has checked you carefully, but problems can develop later. If you notice any problems or new symptoms, get medical treatment right away. Follow-up care is a key part of your treatment and safety. Be sure to make and go to all appointments, and call your doctor if you are having problems. It's also a good idea to know your test results and keep a list of the medicines you take. How can you care for yourself at home? · Keep the cut dry for the first 24 to 48 hours. After this, you can shower if your doctor okays it. Pat the cut dry. · Don't soak the cut, such as in a bathtub. Your doctor will tell you when it's safe to get the cut wet. · If your doctor told you how to care for your cut, follow your doctor's instructions. If you did not get instructions, follow this general advice:  ¨ After the first 24 to 48 hours, wash around the cut with clean water 2 times a day. Don't use hydrogen peroxide or alcohol, which can slow healing. ¨ You may cover the cut with a thin layer of petroleum jelly, such as Vaseline, and a nonstick bandage. ¨ Apply more petroleum jelly and replace the bandage as needed. · Prop up the sore area on a pillow anytime you sit or lie down during the next 3 days.  Try to keep it above the level of your heart. This will help reduce swelling. · Avoid any activity that could cause your cut to reopen. · Do not remove the stitches on your own. Your doctor will tell you when to come back to have the stitches removed. · Leave Steri-Strips on until they fall off. · Be safe with medicines. Read and follow all instructions on the label. ¨ If the doctor gave you a prescription medicine for pain, take it as prescribed. ¨ If you are not taking a prescription pain medicine, ask your doctor if you can take an over-the-counter medicine. When should you call for help? Call your doctor now or seek immediate medical care if:  ? · You have new pain, or your pain gets worse. ? · The skin near the cut is cold or pale or changes color. ? · You have tingling, weakness, or numbness near the cut.   ? · The cut starts to bleed, and blood soaks through the bandage. Oozing small amounts of blood is normal.   ? · You have trouble moving the area near the cut.   ? · You have symptoms of infection, such as:  ¨ Increased pain, swelling, warmth, or redness around the cut. ¨ Red streaks leading from the cut. ¨ Pus draining from the cut. ¨ A fever. ? Watch closely for changes in your health, and be sure to contact your doctor if:  ? · The cut reopens. ? · You do not get better as expected. Where can you learn more? Go to http://manjit-don.info/. Enter R217 in the search box to learn more about \"Cuts Closed With Stitches: Care Instructions. \"  Current as of: March 20, 2017  Content Version: 11.4  © 7005-4284 [employer]. Care instructions adapted under license by [employer] (which disclaims liability or warranty for this information). If you have questions about a medical condition or this instruction, always ask your healthcare professional. [address] any warranty or liability for your use of this information.

## 2018-06-22 ENCOUNTER — OFFICE VISIT (OUTPATIENT)
Dept: FAMILY MEDICINE CLINIC | Age: 58
End: 2018-06-22

## 2018-06-22 VITALS
DIASTOLIC BLOOD PRESSURE: 71 MMHG | BODY MASS INDEX: 24.94 KG/M2 | HEIGHT: 66 IN | OXYGEN SATURATION: 98 % | RESPIRATION RATE: 12 BRPM | WEIGHT: 155.2 LBS | HEART RATE: 85 BPM | TEMPERATURE: 98.1 F | SYSTOLIC BLOOD PRESSURE: 105 MMHG

## 2018-06-22 DIAGNOSIS — S61.012D THUMB LACERATION, LEFT, SUBSEQUENT ENCOUNTER: Primary | ICD-10-CM

## 2018-06-22 NOTE — PATIENT INSTRUCTIONS

## 2018-06-22 NOTE — PROGRESS NOTES
1. Have you been to the ER, urgent care clinic since your last visit? Hospitalized since your last visit? No    2. Have you seen or consulted any other health care providers outside of the [address] [place] [address] since your last visit? Include any pap smears or colon screening. No     Chief Complaint   Patient presents with    Suture Removal     left hand Thumb.

## 2018-06-22 NOTE — MR AVS SNAPSHOT
[address] [address] 
[phone] Patient: Fanny Lagos MRN: [id number] ASB:[birth year] Visit Information Date & Time Provider Department Dept. Phone Encounter #  
 6/22/2018 10:20 AM Erika StephensonTg [phone] [id number] Follow-up Instructions Return if symptoms worsen or fail to improve. Upcoming Health Maintenance Date Due Pneumococcal 19-64 Highest Risk (1 of 3 - PCV13) 3/16/1978 Influenza Age 5 to Adult 8/1/2018 COLONOSCOPY 6/15/2019 DTaP/Tdap/Td series (2 - Td) 12/26/2027 Allergies as of 6/22/2018  Review Complete On: 6/22/2018 By: Erika Stephenson, DO No Known Allergies Current Immunizations  Reviewed on 12/26/2017 Name Date Influenza Vaccine Kristopher Gill) 11/3/2015 Influenza Vaccine (Quad) PF 10/11/2017, 1/11/2017 Tdap 12/26/2017 Zoster Vaccine, Live 11/3/2015 Not reviewed this visit You Were Diagnosed With   
  
 Codes Comments Thumb laceration, left, subsequent encounter    -  Primary ICD-10-CM: M06.943H ICD-9-CM: V58.89, 883.0 Vitals BP Pulse Temp Resp Height(growth percentile) Weight(growth percentile) 105/71 (BP 1 Location: Right arm, BP Patient Position: Sitting) 85 98.1 °F (36.7 °C) (Oral) 12 5' 6\" (1.676 m) 155 lb 3.2 oz (70.4 kg) SpO2 BMI Smoking Status 98% 25.05 kg/m2 Never Smoker BMI and BSA Data Body Mass Index Body Surface Area 25.05 kg/m 2 1.81 m 2 Preferred Pharmacy Pharmacy Name Phone [id number], [address] [facility] [phone] Your Updated Medication List  
  
   
This list is accurate as of 6/22/18 11:09 AM.  Always use your most recent med list. amLODIPine 10 mg tablet Commonly known as:  Kamari Lute Take 1 Tab by mouth daily. losartan-hydroCHLOROthiazide 100-25 mg per tablet Commonly known as:  HYZAAR  
 TAKE 1 TABLET BY MOUTH EVERY DAY  
  
 ONE-A-DAY MEN'S MULTIVITAMIN 400- mcg Tab Generic drug:  multivit-min-folic-vit K-lycop Take  by mouth. tolterodine ER 2 mg ER capsule Commonly known as:  DETROL-LA  
TAKE 1 CAPSULE BY MOUTH EVERY DAY Follow-up Instructions Return if symptoms worsen or fail to improve. Patient Instructions A Healthy Lifestyle: Care Instructions Your Care Instructions A healthy lifestyle can help you feel good, stay at a healthy weight, and have plenty of energy for both work and play. A healthy lifestyle is something you can share with your whole family. A healthy lifestyle also can lower your risk for serious health problems, such as high blood pressure, heart disease, and diabetes. You can follow a few steps listed below to improve your health and the health of your family. Follow-up care is a key part of your treatment and safety. Be sure to make and go to all appointments, and call your doctor if you are having problems. It's also a good idea to know your test results and keep a list of the medicines you take. How can you care for yourself at home? · Do not eat too much sugar, fat, or fast foods. You can still have dessert and treats now and then. The goal is moderation. · Start small to improve your eating habits. Pay attention to portion sizes, drink less juice and soda pop, and eat more fruits and vegetables. ¨ Eat a healthy amount of food. A 3-ounce serving of meat, for example, is about the size of a deck of cards. Fill the rest of your plate with vegetables and whole grains. ¨ Limit the amount of soda and sports drinks you have every day. Drink more water when you are thirsty. ¨ Eat at least 5 servings of fruits and vegetables every day. It may seem like a lot, but it is not hard to reach this goal. A serving or helping is 1 piece of fruit, 1 cup of vegetables, or 2 cups of leafy, raw vegetables. Have an apple or some carrot sticks as an afternoon snack instead of a candy bar. Try to have fruits and/or vegetables at every meal. 
· Make exercise part of your daily routine. You may want to start with simple activities, such as walking, bicycling, or slow swimming. Try to be active 30 to 60 minutes every day. You do not need to do all 30 to 60 minutes all at once. For example, you can exercise 3 times a day for 10 or 20 minutes. Moderate exercise is safe for most people, but it is always a good idea to talk to your doctor before starting an exercise program. 
· Keep moving. Yeyo Boron the lawn, work in the garden, or [employer]. Take the stairs instead of the elevator at work. · If you smoke, quit. People who smoke have an increased risk for heart attack, stroke, cancer, and other lung illnesses. Quitting is hard, but there are ways to boost your chance of quitting tobacco for good. ¨ Use nicotine gum, patches, or lozenges. ¨ Ask your doctor about stop-smoking programs and medicines. ¨ Keep trying. In addition to reducing your risk of diseases in the future, you will notice some benefits soon after you stop using tobacco. If you have shortness of breath or asthma symptoms, they will likely get better within a few weeks after you quit. · Limit how much alcohol you drink. Moderate amounts of alcohol (up to 2 drinks a day for men, 1 drink a day for women) are okay. But drinking too much can lead to liver problems, high blood pressure, and other health problems. Family health If you have a family, there are many things you can do together to improve your health. · Eat meals together as a family as often as possible. · Eat healthy foods. This includes fruits, vegetables, lean meats and dairy, and whole grains. · Include your family in your fitness plan.  Most people think of activities such as jogging or tennis as the way to fitness, but there are many ways you and your family can be more active. Anything that makes you breathe hard and gets your heart pumping is exercise. Here are some tips: 
¨ Walk to do errands or to take your child to school or the bus. ¨ Go for a family bike ride after dinner instead of watching TV. Where can you learn more? Go to http://manjit-don.info/. Enter B304 in the search box to learn more about \"A Healthy Lifestyle: Care Instructions. \" Current as of: May 12, 2017 Content Version: 11.4 © 0366-5554 [employer]. Care instructions adapted under license by [employer] (which disclaims liability or warranty for this information). If you have questions about a medical condition or this instruction, always ask your healthcare professional. [address] any warranty or liability for your use of this information. Introducing [address]! [employer] introduces [employer] patient portal. Now you can access parts of your medical record, email your doctor's office, and request medication refills online. 1. In your internet browser, go to [email or web address] 2. Click on the First Time User? Click Here link in the Sign In box. You will see the New Member Sign Up page. 3. Enter your [employer] Access Code exactly as it appears below. You will not need to use this code after youve completed the sign-up process. If you do not sign up before the expiration date, you must request a new code. · [employer] Access Code: [email or web address] Expires: 9/5/2018 11:33 AM 
 
4. Enter the last four digits of your Social Security Number (xxxx) and Date of Birth (mm/dd/yyyy) as indicated and click Submit. You will be taken to the next sign-up page. 5. Create a [employer] ID. This will be your [employer] login ID and cannot be changed, so think of one that is secure and easy to remember. 6. Create a [employer] password. You can change your password at any time. 7. Enter your Password Reset Question and Answer. This can be used at a later time if you forget your password. 8. Enter your e-mail address. You will receive e-mail notification when new information is available in [address] 9. Click Sign Up. You can now view and download portions of your medical record. 10. Click the Download Summary menu link to download a portable copy of your medical information. If you have questions, please visit the Frequently Asked Questions section of the [employer] website. Remember, [employer] is NOT to be used for urgent needs. For medical emergencies, dial 911. Now available from your iPhone and Android! Please provide this summary of care documentation to your next provider. Your primary care clinician is listed as Kevin Durham. If you have any questions after today's visit, please call [phone].

## 2018-06-22 NOTE — PROGRESS NOTES
Graciela Gentile is a 60 y.o. male   Chief Complaint   Patient presents with    Suture Removal     left hand Thumb.     pt here for remainder of sutures to be removed from his l thumb. pt otherwise doing well and no drainage,.     he is a 60y.o. year old male who presents for evalution. Reviewed PmHx, RxHx, FmHx, SocHx, AllgHx and updated and dated in the chart. Review of Systems - negative except as listed above in the HPI    Objective:     Vitals:    06/22/18 1027   BP: 105/71   Pulse: 85   Resp: 12   Temp: 98.1 °F (36.7 °C)   TempSrc: Oral   SpO2: 98%   Weight: 155 lb 3.2 oz (70.4 kg)   Height: 5' 6\" (1.676 m)       Current Outpatient Prescriptions   Medication Sig    tolterodine ER (DETROL-LA) 2 mg ER capsule TAKE 1 CAPSULE BY MOUTH EVERY DAY    multivit-min-folic-vit K-lycop (ONE-A-DAY MEN'S MULTIVITAMIN) 400- mcg tab Take  by mouth.  losartan-hydroCHLOROthiazide (HYZAAR) 100-25 mg per tablet TAKE 1 TABLET BY MOUTH EVERY DAY    amLODIPine (NORVASC) 10 mg tablet Take 1 Tab by mouth daily. No current facility-administered medications for this visit. Physical Examination: General appearance - alert, well appearing, and in no distress  Skin - L thumb laceration healing well      Assessment/ Plan:   Diagnoses and all orders for this visit:    1. Thumb laceration, left, subsequent encounter     sutures removed and cleaned and skin glue placed for protection. Follow-up Disposition:  Return if symptoms worsen or fail to improve. I have discussed the diagnosis with the patient and the intended plan as seen in the above orders. The patient has received an after-visit summary and questions were answered concerning future plans. Pt conveyed understanding of plan.     Medication Side Effects and Warnings were discussed with patient      Brock Seen, DO

## 2018-07-26 RX ORDER — AMLODIPINE BESYLATE 10 MG/1
TABLET ORAL
Qty: 90 TAB | Refills: 3 | Status: SHIPPED | OUTPATIENT
Start: 2018-07-26 | End: 2019-08-05 | Stop reason: SDUPTHER

## 2018-10-29 ENCOUNTER — OFFICE VISIT (OUTPATIENT)
Dept: FAMILY MEDICINE CLINIC | Age: 58
End: 2018-10-29

## 2018-10-29 VITALS
WEIGHT: 160 LBS | HEART RATE: 90 BPM | BODY MASS INDEX: 25.71 KG/M2 | HEIGHT: 66 IN | OXYGEN SATURATION: 99 % | SYSTOLIC BLOOD PRESSURE: 123 MMHG | RESPIRATION RATE: 16 BRPM | DIASTOLIC BLOOD PRESSURE: 76 MMHG | TEMPERATURE: 98.3 F

## 2018-10-29 DIAGNOSIS — Z23 ENCOUNTER FOR IMMUNIZATION: ICD-10-CM

## 2018-10-29 DIAGNOSIS — Z11.1 SCREENING FOR TUBERCULOSIS: ICD-10-CM

## 2018-10-29 DIAGNOSIS — I10 ESSENTIAL HYPERTENSION: Primary | ICD-10-CM

## 2018-10-29 NOTE — PROGRESS NOTES
Chief Complaint   Patient presents with    Injection     Flu and PPD     he is a 60y.o. year old male who presents for evalution. Needs PPD for work. No exposures pt aware of, no night sweats, cough, weight loss. Here today for flu shot. No recent fevers, respiratory symptoms, N/V/D, or egg allergies. Pt has received flu shot previously and tolerated well. Does not check BP at home but sometimes has it checked at work, always well controlled. Takes medication daily. Last eye exam in past 6 months. No chest pain, SOB, dizziness, or vision changes. Reviewed PmHx, RxHx, FmHx, SocHx, AllgHx and updated and dated in the chart. Review of Systems - negative except as listed above in the HPI    Objective:     Vitals:    10/29/18 1503   BP: 123/76   Pulse: 90   Resp: 16   Temp: 98.3 °F (36.8 °C)   TempSrc: Oral   SpO2: 99%   Weight: 160 lb (72.6 kg)   Height: 5' 6\" (1.676 m)     Physical Examination: General appearance - alert, well appearing, and in no distress  Chest - clear to auscultation, no wheezes, rales or rhonchi, symmetric air entry  Heart - normal rate, regular rhythm, normal S1, S2, no murmurs, rubs, clicks or gallops  Extremities - peripheral pulses normal, no pedal edema, no clubbing or cyanosis    Assessment/ Plan:   Diagnoses and all orders for this visit:    1. Essential hypertension  Stable. Encouraged pt to monitor BP at home, preferably in AM when first wakes up. Goal BP is < 130/90 if on meds. Discussed consequences of uncontrolled HTN and need for yearly eye exam. Recommended pt limit salt intake and engage in regular exercise. Continue current medications. F/U 3 mo or sooner if needed    2. Encounter for immunization  -     INFLUENZA VIRUS VAC QUAD,SPLIT,PRESV FREE SYRINGE IM  -     CT IMMUNIZ ADMIN,1 SINGLE/COMB VAC/TOXOID    3. Screening for tuberculosis  -     AMB POC TUBERCULOSIS, INTRADERMAL (SKIN TEST)    Pt voiced understanding regarding plan of care.      Follow-up Disposition:  Return in about 2 days (around 10/31/2018) for PPD reading . I have discussed the diagnosis with the patient and the intended plan as seen in the above orders. The patient has received an after-visit summary and questions were answered concerning future plans.      Medication Side Effects and Warnings were discussed with patient    Arnol Deng NP

## 2018-10-29 NOTE — PROGRESS NOTES
1. Have you been to the ER, urgent care clinic since your last visit? Hospitalized since your last visit? No    2. Have you seen or consulted any other health care providers outside of the [address] [place] [address] since your last visit? Include any pap smears or colon screening.  No   Chief Complaint   Patient presents with    Injection     Flu and PPD

## 2018-10-31 LAB
MM INDURATION POC: 0 MM (ref 0–5)
PPD POC: NEGATIVE NEGATIVE

## 2018-11-09 ENCOUNTER — TELEPHONE (OUTPATIENT)
Dept: FAMILY MEDICINE CLINIC | Age: 58
End: 2018-11-09

## 2018-11-09 RX ORDER — AZITHROMYCIN 250 MG/1
TABLET, FILM COATED ORAL
Qty: 6 TAB | Refills: 0 | Status: SHIPPED | OUTPATIENT
Start: 2018-11-09 | End: 2018-11-14

## 2018-11-09 NOTE — TELEPHONE ENCOUNTER
Patient called in requesting an antibiotic for his cough. Did not want to make an appointment. He stated that he \"knows what he's got\".    Phone [phone]

## 2019-01-10 ENCOUNTER — OFFICE VISIT (OUTPATIENT)
Dept: FAMILY MEDICINE CLINIC | Age: 59
End: 2019-01-10

## 2019-01-10 VITALS
HEIGHT: 66 IN | WEIGHT: 170.38 LBS | DIASTOLIC BLOOD PRESSURE: 79 MMHG | RESPIRATION RATE: 18 BRPM | TEMPERATURE: 98.9 F | BODY MASS INDEX: 27.38 KG/M2 | OXYGEN SATURATION: 100 % | SYSTOLIC BLOOD PRESSURE: 126 MMHG | HEART RATE: 82 BPM

## 2019-01-10 DIAGNOSIS — M62.830 MUSCLE SPASM OF BACK: Primary | ICD-10-CM

## 2019-01-10 RX ORDER — DICLOFENAC SODIUM 75 MG/1
75 TABLET, DELAYED RELEASE ORAL
Qty: 60 TAB | Refills: 1 | Status: SHIPPED | OUTPATIENT
Start: 2019-01-10 | End: 2019-05-15 | Stop reason: ALTCHOICE

## 2019-01-10 RX ORDER — METHOCARBAMOL 750 MG/1
750 TABLET, FILM COATED ORAL
Qty: 60 TAB | Refills: 2 | Status: SHIPPED | OUTPATIENT
Start: 2019-01-10 | End: 2019-05-15 | Stop reason: ALTCHOICE

## 2019-01-10 NOTE — PROGRESS NOTES
Chief Complaint   Patient presents with    Back Pain     back pain- got worse starting on monday no really pain alot stiffness     he is a 60y.o. year old male who presents for evalution. Pt states started having pain at work on Monday, works as . Doesn't remember doing anything but does a lot of lifting and moving. Next day was very tight, no real pain - just feels stiff and tight, like can't move easily. Has hx of back surgery years ago. Reviewed PmHx, RxHx, FmHx, SocHx, AllgHx and updated and dated in the chart. Review of Systems - negative except as listed above in the HPI    Objective:     Vitals:    01/10/19 1048   BP: 126/79   Pulse: 82   Resp: 18   Temp: 98.9 °F (37.2 °C)   TempSrc: Oral   SpO2: 100%   Weight: 170 lb 6 oz (77.3 kg)   Height: 5' 6\" (1.676 m)     Physical Examination: General appearance - alert, well appearing, and in no distress  Chest - clear to auscultation, no wheezes, rales or rhonchi, symmetric air entry  Heart - normal rate, regular rhythm, normal S1, S2, no murmurs, rubs, clicks or gallops  Back exam - antalgic gait, limited range of motion, pain with motion noted during exam    Assessment/ Plan:   Diagnoses and all orders for this visit:    1. Muscle spasm of back  -     methocarbamol (ROBAXIN) 750 mg tablet; Take 1 Tab by mouth three (3) times daily as needed. For muscle spasm  -     diclofenac EC (VOLTAREN) 75 mg EC tablet; Take 1 Tab by mouth two (2) times daily (after meals). New rxs. Activity modification, gentle stretching. F/U prn     Pt voiced understanding regarding plan of care. Follow-up Disposition:  Return if symptoms worsen or fail to improve. I have discussed the diagnosis with the patient and the intended plan as seen in the above orders. The patient has received an after-visit summary and questions were answered concerning future plans.      Medication Side Effects and Warnings were discussed with patient    Khadra Roldan NP

## 2019-01-10 NOTE — PATIENT INSTRUCTIONS
Back Pain: Care Instructions  Your Care Instructions    Back pain has many possible causes. It is often related to problems with muscles and ligaments of the back. It may also be related to problems with the nerves, discs, or bones of the back. Moving, lifting, standing, sitting, or sleeping in an awkward way can strain the back. Sometimes you don't notice the injury until later. Arthritis is another common cause of back pain. Although it may hurt a lot, back pain usually improves on its own within several weeks. Most people recover in 12 weeks or less. Using good home treatment and being careful not to stress your back can help you feel better sooner. Follow-up care is a key part of your treatment and safety. Be sure to make and go to all appointments, and call your doctor if you are having problems. It's also a good idea to know your test results and keep a list of the medicines you take. How can you care for yourself at home? · Sit or lie in positions that are most comfortable and reduce your pain. Try one of these positions when you lie down:  ? Lie on your back with your knees bent and supported by large pillows. ? Lie on the floor with your legs on the seat of a sofa or chair. ? Lie on your side with your knees and hips bent and a pillow between your legs. ? Lie on your stomach if it does not make pain worse. · Do not sit up in bed, and avoid soft couches and twisted positions. Bed rest can help relieve pain at first, but it delays healing. Avoid bed rest after the first day of back pain. · Change positions every 30 minutes. If you must sit for long periods of time, take breaks from sitting. Get up and walk around, or lie in a comfortable position. · Try using a heating pad on a low or medium setting for 15 to 20 minutes every 2 or 3 hours. Try a warm shower in place of one session with the heating pad. · You can also try an ice pack for 10 to 15 minutes every 2 to 3 hours.  Put a thin cloth between the ice pack and your skin. · Take pain medicines exactly as directed. ? If the doctor gave you a prescription medicine for pain, take it as prescribed. ? If you are not taking a prescription pain medicine, ask your doctor if you can take an over-the-counter medicine. · Take short walks several times a day. You can start with 5 to 10 minutes, 3 or 4 times a day, and work up to longer walks. Walk on level surfaces and avoid hills and stairs until your back is better. · Return to work and other activities as soon as you can. Continued rest without activity is usually not good for your back. · To prevent future back pain, do exercises to stretch and strengthen your back and stomach. Learn how to use good posture, safe lifting techniques, and proper body mechanics. When should you call for help? Call your doctor now or seek immediate medical care if:    · You have new or worsening numbness in your legs.     · You have new or worsening weakness in your legs. (This could make it hard to stand up.)     · You lose control of your bladder or bowels.    Watch closely for changes in your health, and be sure to contact your doctor if:    · You have a fever, lose weight, or don't feel well.     · You do not get better as expected. Where can you learn more? Go to http://manjit-don.info/. Enter C591 in the search box to learn more about \"Back Pain: Care Instructions. \"  Current as of: November 29, 2017  Content Version: 11.8  © 0609-7536 [employer]. Care instructions adapted under license by [employer] (which disclaims liability or warranty for this information). If you have questions about a medical condition or this instruction, always ask your healthcare professional. [place] [address] any warranty or liability for your use of this information.

## 2019-01-10 NOTE — PROGRESS NOTES
1. Have you been to the ER, urgent care clinic since your last visit? Hospitalized since your last visit? No    2. Have you seen or consulted any other health care providers outside of the [address] [place] [address] since your last visit? Include any pap smears or colon screening.  No   Chief Complaint   Patient presents with    Back Pain     back pain- got worse starting on monday no really pain alot stiffness

## 2019-01-31 ENCOUNTER — DOCUMENTATION ONLY (OUTPATIENT)
Dept: FAMILY MEDICINE CLINIC | Age: 59
End: 2019-01-31

## 2019-01-31 NOTE — PROGRESS NOTES
[address] [place] [address] registry status request was put on AB Mendenhall's desk to process

## 2019-02-05 DIAGNOSIS — G62.9 NEUROPATHY: ICD-10-CM

## 2019-02-05 RX ORDER — GABAPENTIN 300 MG/1
CAPSULE ORAL
Qty: 60 CAP | Refills: 2 | Status: SHIPPED | OUTPATIENT
Start: 2019-02-05 | End: 2019-05-15 | Stop reason: SDUPTHER

## 2019-03-05 RX ORDER — LOSARTAN POTASSIUM AND HYDROCHLOROTHIAZIDE 25; 100 MG/1; MG/1
TABLET ORAL
Qty: 90 TAB | Refills: 0 | Status: SHIPPED | OUTPATIENT
Start: 2019-03-05 | End: 2019-05-27 | Stop reason: SDUPTHER

## 2019-05-15 ENCOUNTER — OFFICE VISIT (OUTPATIENT)
Dept: FAMILY MEDICINE CLINIC | Age: 59
End: 2019-05-15

## 2019-05-15 VITALS
HEIGHT: 66 IN | DIASTOLIC BLOOD PRESSURE: 84 MMHG | TEMPERATURE: 98.3 F | OXYGEN SATURATION: 98 % | BODY MASS INDEX: 27.16 KG/M2 | SYSTOLIC BLOOD PRESSURE: 128 MMHG | WEIGHT: 169 LBS | RESPIRATION RATE: 14 BRPM | HEART RATE: 97 BPM

## 2019-05-15 DIAGNOSIS — G62.89 OTHER POLYNEUROPATHY: ICD-10-CM

## 2019-05-15 DIAGNOSIS — Z00.00 WELL ADULT EXAM: Primary | ICD-10-CM

## 2019-05-15 DIAGNOSIS — G62.9 NEUROPATHY: ICD-10-CM

## 2019-05-15 DIAGNOSIS — R73.01 IMPAIRED FASTING BLOOD SUGAR: ICD-10-CM

## 2019-05-15 RX ORDER — GABAPENTIN 300 MG/1
CAPSULE ORAL
Qty: 60 CAP | Refills: 5 | Status: SHIPPED | OUTPATIENT
Start: 2019-05-15 | End: 2019-08-30

## 2019-05-15 NOTE — PROGRESS NOTES
Pt in office today for his feet  -pt states that it feels he is walking on rocks at time  -pt states at night pain radiates to his hip and feels like he is being stuck with needles      Pt has no other concerns

## 2019-05-15 NOTE — PROGRESS NOTES
Subjective:   Sudhir Culp is a 60 y.o. male presenting for his annual checkup. ROS:  Feeling well. No dyspnea or chest pain on exertion. No abdominal pain, change in bowel habits, black or bloody stools. No urinary tract or prostatic symptoms. No neurological complaints. Specific concerns today: he is having severe urinary freq. Patient Active Problem List    Diagnosis Date Noted    Essential hypertension 06/15/2017    Prostate cancer ([address] 12/29/2015     Current Outpatient Medications   Medication Sig Dispense Refill    gabapentin (NEURONTIN) 300 mg capsule TAKE 1 CAPSULE BY MOUTH TWICE A DAY 60 Cap 5    losartan-hydroCHLOROthiazide (HYZAAR) 100-25 mg per tablet TAKE 1 TABLET BY MOUTH EVERY DAY 90 Tab 0    amLODIPine (NORVASC) 10 mg tablet TAKE 1 TABLET BY MOUTH EVERY DAY 90 Tab 3    multivit-min-folic-vit K-lycop (ONE-A-DAY MEN'S MULTIVITAMIN) 400- mcg tab Take  by mouth. Family History   Problem Relation Age of Onset    Diabetes Mother     Hypertension Mother    [facility] Anesth Problems Neg Hx      Social History     Tobacco Use    Smoking status: Never Smoker    Smokeless tobacco: Never Used   Substance Use Topics    Alcohol use: Yes     Comment: 3 WEEK             Objective:     Visit Vitals  /84 (BP 1 Location: Right arm, BP Patient Position: Sitting)   Pulse 97   Temp 98.3 °F (36.8 °C) (Oral)   Resp 14   Ht 5' 6\" (1.676 m)   Wt 169 lb (76.7 kg)   SpO2 98%   BMI 27.28 kg/m²     The patient appears well, alert, oriented x 3, in no distress. ENT normal.  Neck supple. No adenopathy or thyromegaly. JESSICA. Lungs are clear, good air entry, no wheezes, rhonchi or rales. S1 and S2 normal, no murmurs, regular rate and rhythm. Abdomen is soft without tenderness, guarding, mass or organomegaly.  exam: deferred. Extremities show no edema, normal peripheral pulses. Neurological is normal without focal findings.     Assessment/Plan:   healthy adult male  lose weight, increase physical activity, follow low fat diet, follow low salt diet, routine labs ordered. Encounter Diagnoses   Name Primary?  Well adult exam Yes    Neuropathy     Impaired fasting blood sugar     Other polyneuropathy      Orders Placed This Encounter    LIPID PANEL    CBC WITH AUTOMATED DIFF    METABOLIC PANEL, COMPREHENSIVE    TSH 3RD GENERATION    HEMOGLOBIN A1C WITH EAG    MICROALBUMIN, UR, RAND W/ MICROALB/CREAT RATIO    gabapentin (NEURONTIN) 300 mg capsule    mirabegron ER (MYRBETRIQ) 25 mg ER tablet   . I have discussed the diagnosis with the patient and the intended plan as seen in the above orders. The patient has received an after-visit summary and questions were answered concerning future plans. Patient conveyed understanding of the plan at the time of the visit.     Patsy Tapia, MSN, ANP  5/15/2019

## 2019-05-16 LAB
ALBUMIN SERPL-MCNC: 4.3 G/DL (ref 3.6–4.8)
ALBUMIN/CREAT UR: 2.4 MG/G CREAT (ref 0–30)
ALBUMIN/GLOB SERPL: 1.6 {RATIO} (ref 1.2–2.2)
ALP SERPL-CCNC: 90 IU/L (ref 39–117)
ALT SERPL-CCNC: 16 IU/L (ref 0–44)
AST SERPL-CCNC: 25 IU/L (ref 0–40)
BASOPHILS # BLD AUTO: 0 X10E3/UL (ref 0–0.2)
BASOPHILS NFR BLD AUTO: 0 %
BILIRUB SERPL-MCNC: 0.9 MG/DL (ref 0–1.2)
BUN SERPL-MCNC: 18 MG/DL (ref 8–27)
BUN/CREAT SERPL: 16 (ref 10–24)
CALCIUM SERPL-MCNC: 9.9 MG/DL (ref 8.6–10.2)
CHLORIDE SERPL-SCNC: 102 MMOL/L (ref 96–106)
CHOLEST SERPL-MCNC: 139 MG/DL (ref 100–199)
CO2 SERPL-SCNC: 25 MMOL/L (ref 20–29)
CREAT SERPL-MCNC: 1.12 MG/DL (ref 0.76–1.27)
CREAT UR-MCNC: 149.2 MG/DL
EOSINOPHIL # BLD AUTO: 0.1 X10E3/UL (ref 0–0.4)
EOSINOPHIL NFR BLD AUTO: 2 %
ERYTHROCYTE [DISTWIDTH] IN BLOOD BY AUTOMATED COUNT: 14.2 % (ref 12.3–15.4)
EST. AVERAGE GLUCOSE BLD GHB EST-MCNC: 126 MG/DL
GLOBULIN SER CALC-MCNC: 2.7 G/DL (ref 1.5–4.5)
GLUCOSE SERPL-MCNC: 104 MG/DL (ref 65–99)
HBA1C MFR BLD: 6 % (ref 4.8–5.6)
HCT VFR BLD AUTO: 43.1 % (ref 37.5–51)
HDLC SERPL-MCNC: 46 MG/DL
HGB BLD-MCNC: 14.5 G/DL (ref 13–17.7)
IMM GRANULOCYTES # BLD AUTO: 0 X10E3/UL (ref 0–0.1)
IMM GRANULOCYTES NFR BLD AUTO: 0 %
INTERPRETATION, 910389: NORMAL
LDLC SERPL CALC-MCNC: 78 MG/DL (ref 0–99)
LYMPHOCYTES # BLD AUTO: 0.9 X10E3/UL (ref 0.7–3.1)
LYMPHOCYTES NFR BLD AUTO: 18 %
MCH RBC QN AUTO: 30 PG (ref 26.6–33)
MCHC RBC AUTO-ENTMCNC: 33.6 G/DL (ref 31.5–35.7)
MCV RBC AUTO: 89 FL (ref 79–97)
MICROALBUMIN UR-MCNC: 3.6 UG/ML
MONOCYTES # BLD AUTO: 0.8 X10E3/UL (ref 0.1–0.9)
MONOCYTES NFR BLD AUTO: 16 %
NEUTROPHILS # BLD AUTO: 3.2 X10E3/UL (ref 1.4–7)
NEUTROPHILS NFR BLD AUTO: 64 %
PLATELET # BLD AUTO: 282 X10E3/UL (ref 150–379)
POTASSIUM SERPL-SCNC: 3.9 MMOL/L (ref 3.5–5.2)
PROT SERPL-MCNC: 7 G/DL (ref 6–8.5)
RBC # BLD AUTO: 4.84 X10E6/UL (ref 4.14–5.8)
SODIUM SERPL-SCNC: 142 MMOL/L (ref 134–144)
TRIGL SERPL-MCNC: 76 MG/DL (ref 0–149)
TSH SERPL DL<=0.005 MIU/L-ACNC: 0.03 UIU/ML (ref 0.45–4.5)
VLDLC SERPL CALC-MCNC: 15 MG/DL (ref 5–40)
WBC # BLD AUTO: 5.1 X10E3/UL (ref 3.4–10.8)

## 2019-05-17 DIAGNOSIS — E03.9 HYPOTHYROIDISM, UNSPECIFIED TYPE: Primary | ICD-10-CM

## 2019-05-17 NOTE — PROGRESS NOTES
Please let him know his labs look great for sugar, chol, and blood count. His lab is very fast for thyroid. Needs to come see Newton Montes for repeat thyroid lab, not a fasting test to make sure this is a real issue.  [facility]

## 2019-05-17 NOTE — PROGRESS NOTES
Spoke with pt id x3 informed pt of labs and that provider would like him to come back to have thyroid labs done. Also informed pt that he did not have to be fasting.  Pt verbalized understanding and states he will come monday

## 2019-05-21 LAB
FT4I SERPL CALC-MCNC: 1.2 (ref 1.2–4.9)
T3RU NFR SERPL: 24 % (ref 24–39)
T4 SERPL-MCNC: 5.2 UG/DL (ref 4.5–12)
THYROPEROXIDASE AB SERPL-ACNC: 111 IU/ML (ref 0–34)
TSH SERPL DL<=0.005 MIU/L-ACNC: 0.39 UIU/ML (ref 0.45–4.5)

## 2019-05-29 RX ORDER — LOSARTAN POTASSIUM AND HYDROCHLOROTHIAZIDE 25; 100 MG/1; MG/1
TABLET ORAL
Qty: 90 TAB | Refills: 0 | Status: SHIPPED | OUTPATIENT
Start: 2019-05-29 | End: 2019-09-05 | Stop reason: SDUPTHER

## 2019-08-07 RX ORDER — AMLODIPINE BESYLATE 10 MG/1
TABLET ORAL
Qty: 90 TAB | Refills: 2 | Status: SHIPPED | OUTPATIENT
Start: 2019-08-07 | End: 2020-05-14

## 2019-08-30 ENCOUNTER — OFFICE VISIT (OUTPATIENT)
Dept: FAMILY MEDICINE CLINIC | Age: 59
End: 2019-08-30

## 2019-08-30 VITALS
WEIGHT: 159 LBS | DIASTOLIC BLOOD PRESSURE: 77 MMHG | HEIGHT: 66 IN | SYSTOLIC BLOOD PRESSURE: 124 MMHG | OXYGEN SATURATION: 100 % | RESPIRATION RATE: 18 BRPM | BODY MASS INDEX: 25.55 KG/M2 | HEART RATE: 85 BPM | TEMPERATURE: 97.8 F

## 2019-08-30 DIAGNOSIS — R73.03 PREDIABETES: ICD-10-CM

## 2019-08-30 DIAGNOSIS — I10 ESSENTIAL HYPERTENSION: ICD-10-CM

## 2019-08-30 DIAGNOSIS — G62.9 PERIPHERAL POLYNEUROPATHY: Primary | ICD-10-CM

## 2019-08-30 RX ORDER — GABAPENTIN 400 MG/1
400 CAPSULE ORAL 3 TIMES DAILY
Qty: 90 CAP | Refills: 2 | Status: SHIPPED | OUTPATIENT
Start: 2019-08-30 | End: 2020-04-16

## 2019-08-30 NOTE — PROGRESS NOTES
Chief Complaint   Patient presents with    Foot Pain     Patient in office today for bilateral feet pain that has been present for awhile. Pt states feels like needles and tingling sensation in the feet. Pt states he was treating with gabapentin, which helped in the beginning. Pt now have not noted any improvement in the past 2-3 months. 1. Have you been to the ER, urgent care clinic since your last visit? Hospitalized since your last visit? No    2. Have you seen or consulted any other health care providers outside of the [address] [place] [address] since your last visit? Include any pap smears or colon screening.  No

## 2019-08-30 NOTE — PROGRESS NOTES
Chief Complaint   Patient presents with    Foot Pain     Patient in office today for bilateral feet pain that has been present for awhile. Pt states feels like needles and tingling sensation in the feet. Pt states he was treating with gabapentin, which helped in the beginning. Was taking the gabapentin daily as needed. Pt now have not noted any improvement in the past 2-3 months. Hemoglobin a1c was 6.1 in May. Pt has not been following a low carb diet. Not currently on medication for his sugar. Denies any swelling or redness. Present in both feet. Sometimes will have the sx in his hands. Since last OV pt saw endocrinology. Has a follow up appt with them in 2 months. Denies any other concerns at this time. Chief Complaint   Patient presents with    Foot Pain     he is a 60y.o. year old male who presents for evalution. Reviewed PmHx, RxHx, FmHx, SocHx, AllgHx and updated and dated in the chart. Review of Systems - negative except as listed above in the HPI    Objective:     Vitals:    08/30/19 0904   BP: 124/77   Pulse: 85   Resp: 18   Temp: 97.8 °F (36.6 °C)   TempSrc: Oral   SpO2: 100%   Weight: 159 lb (72.1 kg)   Height: 5' 6\" (1.676 m)     Physical Examination: General appearance - alert, well appearing, and in no distress  Mental status - normal mood, behavior, speech, dress, motor activity, and thought processes  Chest - clear to auscultation, no wheezes, rales or rhonchi, symmetric air entry  Heart - normal rate, regular rhythm, normal S1, S2, no murmurs  Neurological - DTR's normal and symmetric, motor and sensory grossly normal bilaterally, normal muscle tone, no tremors, strength 5/5  Extremities - peripheral pulses normal, no edema, no clubbing or cyanosis  Skin - normal coloration and turgor, no rashes, no suspicious skin lesions noted    Assessment/ Plan:   Diagnoses and all orders for this visit:    1.  Peripheral polyneuropathy  -     HEMOGLOBIN A1C WITH EAG  - MAGNESIUM  -     VITAMIN A77  -     METABOLIC PANEL, COMPREHENSIVE  -     VITAMIN D, 25 HYDROXY  -     gabapentin (NEURONTIN) 400 mg capsule; Take 1 Cap by mouth three (3) times daily. Max Daily Amount: 1,200 mg. Will notify results and deviate plan based on findings. Increase gabapentin to 400 mg TID PRN. Reinforced SEs/ADRs of medication and how to take responsibly. 2. Essential hypertension  BP at goal on med regimen. 3. Prediabetes  Consider starting daily med pending a1c results to see if getting glucose down helps improve sx of neuropathy. Enc low carb idet. Follow-up and Dispositions    · Return if symptoms worsen or fail to improve. I have discussed the diagnosis with the patient and the intended plan as seen in the above orders. The patient has received an after-visit summary and questions were answered concerning future plans. Medication Side Effects and Warnings were discussed with patient: yes  Patient Labs were reviewed and or requested: yes  Patient Past Records were reviewed and or requested  yes  Patient / Caregiver Understanding of treatment plan was verbalized during office visit YES    ADILIA Michaels    There are no Patient Instructions on file for this visit.

## 2019-08-31 LAB
25(OH)D3+25(OH)D2 SERPL-MCNC: 55.5 NG/ML (ref 30–100)
ALBUMIN SERPL-MCNC: 4.4 G/DL (ref 3.6–4.8)
ALBUMIN/GLOB SERPL: 1.9 {RATIO} (ref 1.2–2.2)
ALP SERPL-CCNC: 103 IU/L (ref 39–117)
ALT SERPL-CCNC: 18 IU/L (ref 0–44)
AST SERPL-CCNC: 21 IU/L (ref 0–40)
BILIRUB SERPL-MCNC: 0.5 MG/DL (ref 0–1.2)
BUN SERPL-MCNC: 13 MG/DL (ref 8–27)
BUN/CREAT SERPL: 12 (ref 10–24)
CALCIUM SERPL-MCNC: 9.4 MG/DL (ref 8.6–10.2)
CHLORIDE SERPL-SCNC: 103 MMOL/L (ref 96–106)
CO2 SERPL-SCNC: 26 MMOL/L (ref 20–29)
CREAT SERPL-MCNC: 1.07 MG/DL (ref 0.76–1.27)
EST. AVERAGE GLUCOSE BLD GHB EST-MCNC: 123 MG/DL
GLOBULIN SER CALC-MCNC: 2.3 G/DL (ref 1.5–4.5)
GLUCOSE SERPL-MCNC: 103 MG/DL (ref 65–99)
HBA1C MFR BLD: 5.9 % (ref 4.8–5.6)
MAGNESIUM SERPL-MCNC: 2.2 MG/DL (ref 1.6–2.3)
POTASSIUM SERPL-SCNC: 4.2 MMOL/L (ref 3.5–5.2)
PROT SERPL-MCNC: 6.7 G/DL (ref 6–8.5)
SODIUM SERPL-SCNC: 145 MMOL/L (ref 134–144)
VIT B12 SERPL-MCNC: 1436 PG/ML (ref 232–1245)

## 2019-09-02 NOTE — PROGRESS NOTES
Please notify pt the followin. Prediabetes is stable. I still recommend pt consider trying metformin daily to see if getting better blood glucose control will help his sx of neuropathy. If amenable will send rx to pharmacy on file for pt to take one tab with dinner daily. 2. Normal vitamin D, magnesium, and b12 levels.

## 2019-09-05 ENCOUNTER — TELEPHONE (OUTPATIENT)
Dept: FAMILY MEDICINE CLINIC | Age: 59
End: 2019-09-05

## 2019-09-05 DIAGNOSIS — R73.03 PREDIABETES: Primary | ICD-10-CM

## 2019-09-05 RX ORDER — METFORMIN HYDROCHLORIDE 500 MG/1
500 TABLET, EXTENDED RELEASE ORAL
Qty: 30 TAB | Refills: 3 | Status: SHIPPED | OUTPATIENT
Start: 2019-09-05 | End: 2020-01-02

## 2019-09-05 RX ORDER — LOSARTAN POTASSIUM AND HYDROCHLOROTHIAZIDE 25; 100 MG/1; MG/1
TABLET ORAL
Qty: 30 TAB | Refills: 2 | Status: SHIPPED | OUTPATIENT
Start: 2019-09-05 | End: 2019-11-08

## 2019-09-05 NOTE — TELEPHONE ENCOUNTER
PT called stated- metformin not called into his pharmacy & he agreed to try .  Pharmacy correct in computer - verfifed by pt

## 2019-11-08 ENCOUNTER — OFFICE VISIT (OUTPATIENT)
Dept: FAMILY MEDICINE CLINIC | Age: 59
End: 2019-11-08

## 2019-11-08 VITALS
TEMPERATURE: 98.1 F | HEART RATE: 95 BPM | RESPIRATION RATE: 20 BRPM | HEIGHT: 66 IN | SYSTOLIC BLOOD PRESSURE: 127 MMHG | DIASTOLIC BLOOD PRESSURE: 86 MMHG | BODY MASS INDEX: 25.28 KG/M2 | WEIGHT: 157.3 LBS | OXYGEN SATURATION: 99 %

## 2019-11-08 DIAGNOSIS — Z76.89 ENCOUNTER FOR ASSESSMENT OF STD EXPOSURE: ICD-10-CM

## 2019-11-08 DIAGNOSIS — I10 ESSENTIAL HYPERTENSION: Primary | ICD-10-CM

## 2019-11-08 DIAGNOSIS — R79.89 LOW TSH LEVEL: ICD-10-CM

## 2019-11-08 DIAGNOSIS — Z23 ENCOUNTER FOR IMMUNIZATION: ICD-10-CM

## 2019-11-08 RX ORDER — VALSARTAN AND HYDROCHLOROTHIAZIDE 320; 25 MG/1; MG/1
1 TABLET, FILM COATED ORAL DAILY
Qty: 90 TAB | Refills: 2 | Status: SHIPPED | OUTPATIENT
Start: 2019-11-08 | End: 2020-08-05 | Stop reason: SDUPTHER

## 2019-11-08 NOTE — PROGRESS NOTES
Chief Complaint   Patient presents with    Complete Physical     Nichelle Bhanu is a 60 y.o. male who presents for evaluation. Has already had CPE May of this year. States that he wants to know if he still needs BP medication, currently on losartan-HCTZ and amlodipine. States he keeps having trouble filling losartan-HCTZ at pharmacy. Also was told to go to endo b/c thyroid was overactive on last check 5/2019. Brought documentation from recent thyroid labs with [facility] on 10/16/2019, will copy and scan to chart, labs were as follows:  TSH: 2.100  Free T4: 1.20  T3: 109  Endo suggested recheck every 6 months    Would like to be tested for HIV, just got a new partner. Would like flu vaccine. Reviewed PmHx, RxHx, FmHx, SocHx, AllgHx and updated and dated in the chart. Patient Active Problem List    Diagnosis    Low TSH level    Essential hypertension    Prostate cancer ([facility] [address]       Review of Systems - negative except as listed above in the HPI    Objective:     Vitals:    11/08/19 1424   BP: 127/86   Pulse: 95   Resp: 20   Temp: 98.1 °F (36.7 °C)   SpO2: 99%   Weight: 157 lb 4.8 oz (71.4 kg)   Height: 5' 6\" (1.676 m)     Physical Examination: General appearance - alert, well appearing, and in no distress  Mental status - alert, oriented to person, place, and time  Chest - clear to auscultation, no wheezes, rales or rhonchi, symmetric air entry  Heart - normal rate, regular rhythm, normal S1, S2, no murmurs, rubs, clicks or gallops  Skin - normal coloration and turgor, no rashes, no suspicious skin lesions noted    Assessment/ Plan:   Diagnoses and all orders for this visit:    1. Essential hypertension  -     METABOLIC PANEL, COMPREHENSIVE; Future  -     valsartan-hydroCHLOROthiazide (DIOVAN-HCT) 320-25 mg per tablet; Take 1 Tab by mouth daily.  - DC losartan-HCTZ d/t recall and backorder, start new med above   - New Rx, advised on use and SE/ADRs    2.  Low TSH level   Thyroid labs with [facility] on 10/16/2019 were as follows (scanned to chart):  TSH: 2.100  Free T4: 1.20  T3: 109  Endo suggested recheck every 6 months    3. Encounter for assessment of STD exposure  -     HIV 1/2 AG/AB, 4TH GENERATION,W RFLX CONFIRM; Future    4. Encounter for immunization  -     INFLUENZA VIRUS VAC QUAD,SPLIT,PRESV FREE SYRINGE IM       Follow-up and Dispositions    · Return if symptoms worsen or fail to improve. I have discussed the diagnosis with the patient and the intended plan as seen in the above orders. The patient understands and agrees with the plan. The patient has received an after-visit summary and questions were answered concerning future plans. Medication Side Effects and Warnings were discussed with patient  Patient Labs were reviewed and or requested:  Patient Past Records were reviewed and or requested    Michael Erickson NP  [address] [facility]    There are no Patient Instructions on file for this visit.

## 2019-11-08 NOTE — PROGRESS NOTES
Digna Horowitz is a 60 y.o. male , id x 2(name and ). Reviewed record, history, and  medications. Chief Complaint   Patient presents with    Complete Physical       Vitals:    19 1424   BP: 127/86   Pulse: 95   Resp: 20   Temp: 98.1 °F (36.7 °C)   SpO2: 99%   Weight: 157 lb 4.8 oz (71.4 kg)   Height: 5' 6\" (1.676 m)       Coordination of Care Questionnaire:   1) Have you been to an emergency room, urgent care, or hospitalized since your last visit?   no       2. Have seen or consulted any other health care provider since your last visit? NO    Patient is accompanied by self I have received verbal consent from Digna Horowitz to discuss any/all medical information while they are present in the room.

## 2019-11-14 ENCOUNTER — HOSPITAL ENCOUNTER (OUTPATIENT)
Dept: LAB | Age: 59
Discharge: HOME | End: 2019-11-14

## 2019-11-14 DIAGNOSIS — I10 ESSENTIAL HYPERTENSION: ICD-10-CM

## 2019-11-14 DIAGNOSIS — Z76.89 ENCOUNTER FOR ASSESSMENT OF STD EXPOSURE: ICD-10-CM

## 2019-11-14 LAB
ALBUMIN SERPL-MCNC: 4.2 G/DL (ref 3.5–5)
ALBUMIN/GLOB SERPL: 1.4 {RATIO} (ref 1.1–2.2)
ALP SERPL-CCNC: 105 U/L (ref 45–117)
ALT SERPL-CCNC: 19 U/L (ref 12–78)
ANION GAP SERPL CALC-SCNC: 5 MMOL/L (ref 5–15)
AST SERPL-CCNC: 14 U/L (ref 15–37)
BILIRUB SERPL-MCNC: 0.6 MG/DL (ref 0.2–1)
BUN SERPL-MCNC: 15 MG/DL (ref 6–20)
BUN/CREAT SERPL: 12 (ref 12–20)
CALCIUM SERPL-MCNC: 9.4 MG/DL (ref 8.5–10.1)
CHLORIDE SERPL-SCNC: 106 MMOL/L (ref 97–108)
CO2 SERPL-SCNC: 31 MMOL/L (ref 21–32)
CREAT SERPL-MCNC: 1.22 MG/DL (ref 0.7–1.3)
GLOBULIN SER CALC-MCNC: 3 G/DL (ref 2–4)
GLUCOSE SERPL-MCNC: 108 MG/DL (ref 65–100)
HIV 1+2 AB+HIV1 P24 AG SERPL QL IA: NONREACTIVE
HIV12 RESULT COMMENT, HHIVC: NORMAL
POTASSIUM SERPL-SCNC: 3.9 MMOL/L (ref 3.5–5.1)
PROT SERPL-MCNC: 7.2 G/DL (ref 6.4–8.2)
SODIUM SERPL-SCNC: 142 MMOL/L (ref 136–145)

## 2019-11-15 NOTE — PROGRESS NOTES
Please inform that HIV test was negative. His metabolic panel looks good, normal kidney and liver function.

## 2020-01-01 DIAGNOSIS — R73.03 PREDIABETES: ICD-10-CM

## 2020-01-02 RX ORDER — METFORMIN HYDROCHLORIDE 500 MG/1
TABLET, EXTENDED RELEASE ORAL
Qty: 30 TAB | Refills: 3 | Status: SHIPPED | OUTPATIENT
Start: 2020-01-02 | End: 2020-05-14

## 2020-04-16 DIAGNOSIS — G62.9 PERIPHERAL POLYNEUROPATHY: ICD-10-CM

## 2020-04-16 RX ORDER — GABAPENTIN 400 MG/1
CAPSULE ORAL
Qty: 90 CAP | Refills: 0 | Status: SHIPPED | OUTPATIENT
Start: 2020-04-16 | End: 2020-07-14 | Stop reason: DRUGHIGH

## 2020-05-14 DIAGNOSIS — R73.03 PREDIABETES: ICD-10-CM

## 2020-05-14 RX ORDER — AMLODIPINE BESYLATE 10 MG/1
TABLET ORAL
Qty: 30 TAB | Refills: 8 | Status: SHIPPED | OUTPATIENT
Start: 2020-05-14 | End: 2021-02-08

## 2020-05-14 RX ORDER — METFORMIN HYDROCHLORIDE 500 MG/1
TABLET, EXTENDED RELEASE ORAL
Qty: 30 TAB | Refills: 3 | Status: SHIPPED | OUTPATIENT
Start: 2020-05-14 | End: 2020-10-06

## 2020-07-14 ENCOUNTER — VIRTUAL VISIT (OUTPATIENT)
Dept: FAMILY MEDICINE CLINIC | Age: 60
End: 2020-07-14

## 2020-07-14 ENCOUNTER — TELEPHONE (OUTPATIENT)
Dept: FAMILY MEDICINE CLINIC | Age: 60
End: 2020-07-14

## 2020-07-14 DIAGNOSIS — G62.9 PERIPHERAL POLYNEUROPATHY: ICD-10-CM

## 2020-07-14 RX ORDER — GABAPENTIN 600 MG/1
600 TABLET ORAL 3 TIMES DAILY
Qty: 90 TAB | Refills: 2 | Status: SHIPPED | OUTPATIENT
Start: 2020-07-14 | End: 2021-01-19 | Stop reason: ALTCHOICE

## 2020-07-14 NOTE — PROGRESS NOTES
Juliet Driscoll is a 63 y.o. male who was seen by synchronous (real-time) audio-video technology on 7/14/2020 for No chief complaint on file. Assessment & Plan:       I spent at least 15 minutes on this visit with this established patient. 712  Subjective:   Patient is requesting a refill of his gabapentin  Takes TID, wants to increase the dose  Takes for DM neuropathy    Prior to Admission medications    Medication Sig Start Date End Date Taking? Authorizing Provider   gabapentin (NEURONTIN) 600 mg tablet Take 1 Tab by mouth three (3) times daily. Max Daily Amount: 1,800 mg. 7/14/20  Yes Med Garcia NP   metFORMIN ER (GLUCOPHAGE XR) 500 mg tablet TAKE 1 TABLET BY MOUTH EVERY DAY WITH DINNER 5/14/20   Ryan Rey NP   amLODIPine (NORVASC) 10 mg tablet TAKE 1 TABLET BY MOUTH EVERY DAY 5/14/20   Med Garcia NP   gabapentin (NEURONTIN) 400 mg capsule Take 1 capsule by mouth 3 times a day. Appointment required for next refill. 4/16/20 7/14/20  Ryan Rey NP   valsartan-hydroCHLOROthiazide (DIOVAN-HCT) 320-25 mg per tablet Take 1 Tab by mouth daily. 11/8/19   Soraya Corona NP   mirabegron ER (MYRBETRIQ) 25 mg ER tablet Take 1 Tab by mouth daily. 5/15/19   Med Garcia NP   multivit-min-folic-vit K-lycop (ONE-A-DAY MEN'S MULTIVITAMIN) 400- mcg tab Take  by mouth. Provider, Historical     Patient Active Problem List    Diagnosis Date Noted    Low TSH level 11/08/2019    Essential hypertension 06/15/2017    [facility]2015       ROS    Objective:   No flowsheet data found.    General: alert, cooperative, no distress   Mental  status: normal mood, behavior, speech, dress, motor activity, and thought processes, able to follow commands   HENT: NCAT   Neck: no visualized mass   Resp: no respiratory distress   Neuro: no gross deficits   Skin: no discoloration or lesions of concern on visible areas   Psychiatric: normal affect, consistent with stated mood, no evidence of hallucinations     Additional exam findings: none    Diagnoses and all orders for this visit:    1. Peripheral polyneuropathy  -     gabapentin (NEURONTIN) 600 mg tablet; Take 1 Tab by mouth three (3) times daily. Max Daily Amount: 1,800 mg. Encouraged to make an appt for labs and follow up      We discussed the expected course, resolution and complications of the diagnosis(es) in detail. Medication risks, benefits, costs, interactions, and alternatives were discussed as indicated. I advised him to contact the office if his condition worsens, changes or fails to improve as anticipated. He expressed understanding with the diagnosis(es) and plan. Elida Martins, who was evaluated through a patient-initiated, synchronous (real-time) audio-video encounter, and/or his healthcare decision maker, is aware that it is a billable service, with coverage as determined by his insurance carrier. He provided verbal consent to proceed: Yes, and patient identification was verified. It was conducted pursuant to the emergency declaration under the [address] [place] [address] authority and the [employer] Act. A caregiver was present when appropriate. Ability to conduct physical exam was limited. I was in the office. The patient was at home.       Augustina Matthews NP

## 2020-07-14 NOTE — TELEPHONE ENCOUNTER
Patient called and states that he spoke with Orville Ngo and needs to set up an appointment for when we reopen to come in to be seen for his Gabapentin. His virtual on 07/14/2020 was a med refill. Please call him back at [phone].

## 2020-07-21 ENCOUNTER — OFFICE VISIT (OUTPATIENT)
Dept: FAMILY MEDICINE CLINIC | Age: 60
End: 2020-07-21

## 2020-07-21 VITALS
SYSTOLIC BLOOD PRESSURE: 116 MMHG | DIASTOLIC BLOOD PRESSURE: 73 MMHG | HEIGHT: 66 IN | BODY MASS INDEX: 25.71 KG/M2 | HEART RATE: 76 BPM | TEMPERATURE: 98.2 F | RESPIRATION RATE: 12 BRPM | WEIGHT: 160 LBS | OXYGEN SATURATION: 95 %

## 2020-07-21 DIAGNOSIS — Z12.11 SCREENING FOR COLON CANCER: ICD-10-CM

## 2020-07-21 DIAGNOSIS — I10 ESSENTIAL HYPERTENSION: ICD-10-CM

## 2020-07-21 DIAGNOSIS — R73.01 IMPAIRED FASTING BLOOD SUGAR: ICD-10-CM

## 2020-07-21 DIAGNOSIS — Z00.00 WELL ADULT EXAM: Primary | ICD-10-CM

## 2020-07-21 DIAGNOSIS — Z12.5 SCREENING PSA (PROSTATE SPECIFIC ANTIGEN): ICD-10-CM

## 2020-07-21 NOTE — PROGRESS NOTES
Chief Complaint   Patient presents with    Complete Physical    Labs     Pt in office today for cpe/labs    1. Have you been to the ER, urgent care clinic since your last visit? Hospitalized since your last visit? No    2. Have you seen or consulted any other health care providers outside of the [address] [place] [address] since your last visit? Include any pap smears or colon screening.  No     Pt has no other concerns

## 2020-07-21 NOTE — PROGRESS NOTES
Subjective:   Natty Gonzalez is a 63 y.o. male presenting for his annual checkup. ROS:  Feeling well. No dyspnea or chest pain on exertion. No abdominal pain, change in bowel habits, black or bloody stools. No urinary tract or prostatic symptoms. No neurological complaints. Specific concerns today: needs to have labs updated and this is his first in person visit, last new patient visit was virtual.    Patient Active Problem List    Diagnosis Date Noted    Low TSH level 11/08/2019    Essential hypertension 06/15/2017    [facility] ([facility] 12/29/2015     Current Outpatient Medications   Medication Sig Dispense Refill    gabapentin (NEURONTIN) 600 mg tablet Take 1 Tab by mouth three (3) times daily. Max Daily Amount: 1,800 mg. 90 Tab 2    metFORMIN ER (GLUCOPHAGE XR) 500 mg tablet TAKE 1 TABLET BY MOUTH EVERY DAY WITH DINNER 30 Tab 3    amLODIPine (NORVASC) 10 mg tablet TAKE 1 TABLET BY MOUTH EVERY DAY 30 Tab 8    valsartan-hydroCHLOROthiazide (DIOVAN-HCT) 320-25 mg per tablet Take 1 Tab by mouth daily. 90 Tab 2    mirabegron ER (MYRBETRIQ) 25 mg ER tablet Take 1 Tab by mouth daily. 30 Tab 5    multivit-min-folic-vit K-lycop (ONE-A-DAY MEN'S MULTIVITAMIN) 400- mcg tab Take  by mouth. Family History   Problem Relation Age of Onset    Diabetes Mother     Hypertension Mother    [facility] Anesth Problems Neg Hx      Social History     Tobacco Use    Smoking status: Never Smoker    Smokeless tobacco: Never Used   Substance Use Topics    Alcohol use: Yes     Comment: 3 WEEK             Objective:     Visit Vitals  /73 (BP 1 Location: Right arm, BP Patient Position: Sitting)   Pulse 76   Temp 98.2 °F (36.8 °C) (Oral)   Resp 12   Ht 5' 6\" (1.676 m)   Wt 160 lb (72.6 kg)   SpO2 95%   BMI 25.82 kg/m²     The patient appears well, alert, oriented x 3, in no distress. ENT normal.  Neck supple. No adenopathy or thyromegaly. JESSICA. Lungs are clear, good air entry, no wheezes, rhonchi or rales.  S1 and S2 normal, no murmurs, regular rate and rhythm. Abdomen is soft without tenderness, guarding, mass or organomegaly.  exam: deferred. Extremities show no edema, normal peripheral pulses. Neurological is normal without focal findings. Assessment/Plan:   healthy adult male  lose weight, increase physical activity, follow low fat diet, follow low salt diet, routine labs ordered. Encounter Diagnoses   Name Primary?  Well adult exam Yes    Essential hypertension     Impaired fasting blood sugar     Screening PSA (prostate specific antigen)     Screening for colon cancer      Orders Placed This Encounter    LIPID PANEL    METABOLIC PANEL, COMPREHENSIVE    CBC WITH AUTOMATED DIFF    TSH 3RD GENERATION    PROSTATE SPECIFIC AG    HEMOGLOBIN A1C WITH EAG    REFERRAL TO GASTROENTEROLOGY   . Referral given to Dr. Leilani Vargas for colonoscopy  Labs updated today  Dev plan with results    I have discussed the diagnosis with the patient and the intended plan as seen in the above orders. The patient has received an after-visit summary and questions were answered concerning future plans. Patient conveyed understanding of the plan at the time of the visit.     Rikki Barthel, MSN, ANP  7/21/2020

## 2020-07-22 LAB
ALBUMIN SERPL-MCNC: 4.4 G/DL (ref 3.8–4.8)
ALBUMIN/GLOB SERPL: 1.8 {RATIO} (ref 1.2–2.2)
ALP SERPL-CCNC: 95 IU/L (ref 39–117)
ALT SERPL-CCNC: 12 IU/L (ref 0–44)
AST SERPL-CCNC: 17 IU/L (ref 0–40)
BASOPHILS # BLD AUTO: 0 X10E3/UL (ref 0–0.2)
BASOPHILS NFR BLD AUTO: 1 %
BILIRUB SERPL-MCNC: 0.4 MG/DL (ref 0–1.2)
BUN SERPL-MCNC: 14 MG/DL (ref 8–27)
BUN/CREAT SERPL: 11 (ref 10–24)
CALCIUM SERPL-MCNC: 9.6 MG/DL (ref 8.6–10.2)
CHLORIDE SERPL-SCNC: 101 MMOL/L (ref 96–106)
CHOLEST SERPL-MCNC: 175 MG/DL (ref 100–199)
CO2 SERPL-SCNC: 26 MMOL/L (ref 20–29)
CREAT SERPL-MCNC: 1.22 MG/DL (ref 0.76–1.27)
EOSINOPHIL # BLD AUTO: 0.1 X10E3/UL (ref 0–0.4)
EOSINOPHIL NFR BLD AUTO: 3 %
ERYTHROCYTE [DISTWIDTH] IN BLOOD BY AUTOMATED COUNT: 13.5 % (ref 11.6–15.4)
EST. AVERAGE GLUCOSE BLD GHB EST-MCNC: 120 MG/DL
GLOBULIN SER CALC-MCNC: 2.4 G/DL (ref 1.5–4.5)
GLUCOSE SERPL-MCNC: 99 MG/DL (ref 65–99)
HBA1C MFR BLD: 5.8 % (ref 4.8–5.6)
HCT VFR BLD AUTO: 43.1 % (ref 37.5–51)
HDLC SERPL-MCNC: 61 MG/DL
HGB BLD-MCNC: 14.6 G/DL (ref 13–17.7)
IMM GRANULOCYTES # BLD AUTO: 0 X10E3/UL (ref 0–0.1)
IMM GRANULOCYTES NFR BLD AUTO: 0 %
INTERPRETATION, 910389: NORMAL
LDLC SERPL CALC-MCNC: 99 MG/DL (ref 0–99)
LYMPHOCYTES # BLD AUTO: 1 X10E3/UL (ref 0.7–3.1)
LYMPHOCYTES NFR BLD AUTO: 25 %
MCH RBC QN AUTO: 30.1 PG (ref 26.6–33)
MCHC RBC AUTO-ENTMCNC: 33.9 G/DL (ref 31.5–35.7)
MCV RBC AUTO: 89 FL (ref 79–97)
MONOCYTES # BLD AUTO: 0.3 X10E3/UL (ref 0.1–0.9)
MONOCYTES NFR BLD AUTO: 8 %
NEUTROPHILS # BLD AUTO: 2.5 X10E3/UL (ref 1.4–7)
NEUTROPHILS NFR BLD AUTO: 63 %
PLATELET # BLD AUTO: 271 X10E3/UL (ref 150–450)
POTASSIUM SERPL-SCNC: 4.3 MMOL/L (ref 3.5–5.2)
PROT SERPL-MCNC: 6.8 G/DL (ref 6–8.5)
PSA SERPL-MCNC: <0.1 NG/ML (ref 0–4)
RBC # BLD AUTO: 4.85 X10E6/UL (ref 4.14–5.8)
SODIUM SERPL-SCNC: 144 MMOL/L (ref 134–144)
TRIGL SERPL-MCNC: 75 MG/DL (ref 0–149)
TSH SERPL DL<=0.005 MIU/L-ACNC: 3.77 UIU/ML (ref 0.45–4.5)
VLDLC SERPL CALC-MCNC: 15 MG/DL (ref 5–40)
WBC # BLD AUTO: 4 X10E3/UL (ref 3.4–10.8)

## 2020-08-05 DIAGNOSIS — I10 ESSENTIAL HYPERTENSION: ICD-10-CM

## 2020-08-05 RX ORDER — VALSARTAN AND HYDROCHLOROTHIAZIDE 320; 25 MG/1; MG/1
1 TABLET, FILM COATED ORAL DAILY
Qty: 90 TAB | Refills: 2 | Status: SHIPPED | OUTPATIENT
Start: 2020-08-05 | End: 2021-05-17

## 2020-10-03 DIAGNOSIS — R73.03 PREDIABETES: ICD-10-CM

## 2020-10-06 RX ORDER — METFORMIN HYDROCHLORIDE 500 MG/1
TABLET, EXTENDED RELEASE ORAL
Qty: 30 TAB | Refills: 3 | Status: SHIPPED | OUTPATIENT
Start: 2020-10-06 | End: 2021-02-08

## 2021-01-19 ENCOUNTER — OFFICE VISIT (OUTPATIENT)
Dept: FAMILY MEDICINE CLINIC | Age: 61
End: 2021-01-19
Payer: COMMERCIAL

## 2021-01-19 VITALS
SYSTOLIC BLOOD PRESSURE: 104 MMHG | HEIGHT: 66 IN | HEART RATE: 91 BPM | DIASTOLIC BLOOD PRESSURE: 68 MMHG | WEIGHT: 159 LBS | RESPIRATION RATE: 16 BRPM | BODY MASS INDEX: 25.55 KG/M2 | TEMPERATURE: 98.6 F | OXYGEN SATURATION: 93 %

## 2021-01-19 DIAGNOSIS — I10 ESSENTIAL HYPERTENSION: ICD-10-CM

## 2021-01-19 DIAGNOSIS — E78.00 HYPERCHOLESTEREMIA: ICD-10-CM

## 2021-01-19 DIAGNOSIS — R73.01 IMPAIRED FASTING BLOOD SUGAR: Primary | ICD-10-CM

## 2021-01-19 DIAGNOSIS — Z12.11 SCREENING FOR COLON CANCER: ICD-10-CM

## 2021-01-19 PROCEDURE — 99214 OFFICE O/P EST MOD 30 MIN: CPT | Performed by: NURSE PRACTITIONER

## 2021-01-19 NOTE — PROGRESS NOTES
Chief Complaint   Patient presents with    Complete Physical    Labs     Pt in office today for cpe  -labs    1. Have you been to the ER, urgent care clinic since your last visit? Hospitalized since your last visit? No    2. Have you seen or consulted any other health care providers outside of the [address] [place] [address] since your last visit? Include any pap smears or colon screening.  No     Pt has no other concerns

## 2021-01-21 NOTE — PROGRESS NOTES
HISTORY OF PRESENT ILLNESS  Miroslava Lima is a 63 y.o. male. HPI  Cardiovascular Review:  The patient has hypertension and hyperlipidemia. Diet and Lifestyle: generally follows a low fat low cholesterol diet, generally follows a low sodium diet, exercises sporadically, nonsmoker  Home BP Monitoring: is not measured at home. Pertinent ROS: taking medications as instructed, no medication side effects noted, no TIA's, no chest pain on exertion, no dyspnea on exertion, no swelling of ankles. Diabetes Mellitus:  He has diabetes mellitus, and  hyperlipidemia. Diabetic ROS - medication compliance: compliant all of the time, diabetic diet compliance: compliant most of the time, home glucose monitoring: is not performed. Lab review: orders written for new lab studies as appropriate; see orders. Needs order for colonoscopy referral  - this is his year 10    Patient Active Problem List    Diagnosis Date Noted    Low TSH level 11/08/2019    Essential hypertension 06/15/2017    [facility]2015     Current Outpatient Medications   Medication Sig Dispense Refill    metFORMIN ER (GLUCOPHAGE XR) 500 mg tablet TAKE 1 TABLET BY MOUTH EVERY DAY WITH DINNER 30 Tab 3    valsartan-hydroCHLOROthiazide (DIOVAN-HCT) 320-25 mg per tablet Take 1 Tab by mouth daily. 90 Tab 2    amLODIPine (NORVASC) 10 mg tablet TAKE 1 TABLET BY MOUTH EVERY DAY 30 Tab 8    multivit-min-folic-vit K-lycop (ONE-A-DAY MEN'S MULTIVITAMIN) 400- mcg tab Take  by mouth.        Family History   Problem Relation Age of Onset    Diabetes Mother     Hypertension Mother     Anesth Problems Neg Hx      Social History     Tobacco Use    Smoking status: Never Smoker    Smokeless tobacco: Never Used   Substance Use Topics    Alcohol use: Yes     Comment: 3 WEEK         ROS  A comprehensive review of system was obtained and negative except findings in the HPI    Visit Vitals  /68 (BP 1 Location: Right arm, BP Patient Position: Sitting)   Pulse 91   Temp 98.6 °F (37 °C) (Oral)   Resp 16   Ht 5' 6\" (1.676 m)   Wt 159 lb (72.1 kg)   SpO2 93%   BMI 25.66 kg/m²     Physical Exam  Vitals signs and nursing note reviewed. Constitutional:       Appearance: Normal appearance. Cardiovascular:      Rate and Rhythm: Normal rate and regular rhythm. Heart sounds: Normal heart sounds. Pulmonary:      Breath sounds: Normal breath sounds. Musculoskeletal:         General: No swelling. Neurological:      Mental Status: He is alert. ASSESSMENT and PLAN  Encounter Diagnoses   Name Primary?  Impaired fasting blood sugar Yes    Essential hypertension     Hypercholesteremia     Screening for colon cancer      Orders Placed This Encounter    LIPID PANEL    CBC WITH AUTOMATED DIFF    HEMOGLOBIN A1C WITH EAG    MICROALBUMIN, UR, RAND W/ MICROALB/CREAT RATIO    METABOLIC PANEL, COMPREHENSIVE    REFERRAL TO GASTROENTEROLOGY     Labs updated today  Referral given  Follow up July for cpx    I have discussed the diagnosis with the patient and the intended plan as seen in the above orders. The patient has received an after-visit summary and questions were answered concerning future plans. Patient conveyed understanding of the plan at the time of the visit.     Giovanna Zhou, MSN, ANP  1/21/2021

## 2021-01-22 LAB
ALBUMIN SERPL-MCNC: 4.5 G/DL (ref 3.8–4.8)
ALBUMIN/GLOB SERPL: 1.9 {RATIO} (ref 1.2–2.2)
ALP SERPL-CCNC: 97 IU/L (ref 39–117)
ALT SERPL-CCNC: 11 IU/L (ref 0–44)
AST SERPL-CCNC: 17 IU/L (ref 0–40)
BASOPHILS # BLD AUTO: 0 X10E3/UL (ref 0–0.2)
BASOPHILS NFR BLD AUTO: 1 %
BILIRUB SERPL-MCNC: 0.4 MG/DL (ref 0–1.2)
BUN SERPL-MCNC: 19 MG/DL (ref 8–27)
BUN/CREAT SERPL: 15 (ref 10–24)
CALCIUM SERPL-MCNC: 9.7 MG/DL (ref 8.6–10.2)
CHLORIDE SERPL-SCNC: 100 MMOL/L (ref 96–106)
CHOLEST SERPL-MCNC: 153 MG/DL (ref 100–199)
CO2 SERPL-SCNC: 24 MMOL/L (ref 20–29)
CREAT SERPL-MCNC: 1.29 MG/DL (ref 0.76–1.27)
CREAT UR-MCNC: NORMAL MG/DL
EOSINOPHIL # BLD AUTO: 0.1 X10E3/UL (ref 0–0.4)
EOSINOPHIL NFR BLD AUTO: 2 %
ERYTHROCYTE [DISTWIDTH] IN BLOOD BY AUTOMATED COUNT: 13.5 % (ref 11.6–15.4)
EST. AVERAGE GLUCOSE BLD GHB EST-MCNC: 126 MG/DL
GLOBULIN SER CALC-MCNC: 2.4 G/DL (ref 1.5–4.5)
GLUCOSE SERPL-MCNC: 94 MG/DL (ref 65–99)
HBA1C MFR BLD: 6 % (ref 4.8–5.6)
HCT VFR BLD AUTO: 45.9 % (ref 37.5–51)
HDLC SERPL-MCNC: 55 MG/DL
HGB BLD-MCNC: 15 G/DL (ref 13–17.7)
IMM GRANULOCYTES # BLD AUTO: 0 X10E3/UL (ref 0–0.1)
IMM GRANULOCYTES NFR BLD AUTO: 0 %
INTERPRETATION, 910389: NORMAL
INTERPRETATION: NORMAL
LDLC SERPL CALC-MCNC: 83 MG/DL (ref 0–99)
LYMPHOCYTES # BLD AUTO: 1.4 X10E3/UL (ref 0.7–3.1)
LYMPHOCYTES NFR BLD AUTO: 31 %
MCH RBC QN AUTO: 29.5 PG (ref 26.6–33)
MCHC RBC AUTO-ENTMCNC: 32.7 G/DL (ref 31.5–35.7)
MCV RBC AUTO: 90 FL (ref 79–97)
MICROALBUMIN UR-MCNC: NORMAL
MONOCYTES # BLD AUTO: 0.3 X10E3/UL (ref 0.1–0.9)
MONOCYTES NFR BLD AUTO: 7 %
NEUTROPHILS # BLD AUTO: 2.8 X10E3/UL (ref 1.4–7)
NEUTROPHILS NFR BLD AUTO: 59 %
PDF IMAGE, 910387: NORMAL
PLATELET # BLD AUTO: 339 X10E3/UL (ref 150–450)
POTASSIUM SERPL-SCNC: 4.3 MMOL/L (ref 3.5–5.2)
PROT SERPL-MCNC: 6.9 G/DL (ref 6–8.5)
RBC # BLD AUTO: 5.09 X10E6/UL (ref 4.14–5.8)
SODIUM SERPL-SCNC: 142 MMOL/L (ref 134–144)
TRIGL SERPL-MCNC: 75 MG/DL (ref 0–149)
VLDLC SERPL CALC-MCNC: 15 MG/DL (ref 5–40)
WBC # BLD AUTO: 4.6 X10E3/UL (ref 3.4–10.8)

## 2021-02-08 DIAGNOSIS — R73.03 PREDIABETES: ICD-10-CM

## 2021-02-08 RX ORDER — METFORMIN HYDROCHLORIDE 500 MG/1
TABLET, EXTENDED RELEASE ORAL
Qty: 90 TAB | Refills: 1 | Status: SHIPPED | OUTPATIENT
Start: 2021-02-08 | End: 2021-07-27 | Stop reason: ALTCHOICE

## 2021-02-08 RX ORDER — AMLODIPINE BESYLATE 10 MG/1
TABLET ORAL
Qty: 90 TAB | Refills: 2 | Status: SHIPPED | OUTPATIENT
Start: 2021-02-08 | End: 2021-11-03

## 2021-05-17 DIAGNOSIS — I10 ESSENTIAL HYPERTENSION: ICD-10-CM

## 2021-05-17 RX ORDER — VALSARTAN AND HYDROCHLOROTHIAZIDE 320; 25 MG/1; MG/1
TABLET, FILM COATED ORAL
Qty: 90 TAB | Refills: 2 | Status: SHIPPED | OUTPATIENT
Start: 2021-05-17 | End: 2021-07-27 | Stop reason: ALTCHOICE

## 2021-06-22 ENCOUNTER — OFFICE VISIT (OUTPATIENT)
Dept: FAMILY MEDICINE CLINIC | Age: 61
End: 2021-06-22
Payer: COMMERCIAL

## 2021-06-22 VITALS
HEIGHT: 66 IN | OXYGEN SATURATION: 96 % | BODY MASS INDEX: 25.39 KG/M2 | DIASTOLIC BLOOD PRESSURE: 74 MMHG | WEIGHT: 158 LBS | HEART RATE: 98 BPM | TEMPERATURE: 98.8 F | SYSTOLIC BLOOD PRESSURE: 112 MMHG

## 2021-06-22 DIAGNOSIS — G62.9 NEUROPATHY: Primary | ICD-10-CM

## 2021-06-22 DIAGNOSIS — R73.03 PREDIABETES: ICD-10-CM

## 2021-06-22 PROCEDURE — 99214 OFFICE O/P EST MOD 30 MIN: CPT | Performed by: STUDENT IN AN ORGANIZED HEALTH CARE EDUCATION/TRAINING PROGRAM

## 2021-06-22 NOTE — PROGRESS NOTES
Progress Note    he is a 57y.o. year old male who presents for evalution. Chief Complaint   Patient presents with    Numbness     hands and feet, states was on Neurontin x2 months, doesn't feel like it helped          Assessment/ Plan:   Diagnoses and all orders for this visit:    1. Neuropathy - Likely related to diabetes. If renal function good, start lyrica. Consider dose increase at follow-up. Consider EMG if labs normal.  -     CBC WITH AUTOMATED DIFF; Future  -     METABOLIC PANEL, COMPREHENSIVE; Future  -     TSH 3RD GENERATION; Future  -     C REACTIVE PROTEIN, QT; Future  -     SED RATE (ESR); Future  -     PERRY, DIRECT, W/REFLEX; Future  -     VITAMIN B12; Future  -     HEMOGLOBIN A1C WITH EAG; Future    2. Prediabetes  -     HEMOGLOBIN A1C WITH EAG; Future       Follow-up and Dispositions    · Return for as previously scheduled. I have discussed the diagnosis with the patient and the intended plan as seen in the above orders. The patient has received an after-visit summary and questions were answered concerning future plans. Pt conveyed understanding of plan. Medication Side Effects and Warnings were discussed with patient        Subjective:     Chief Complaint   Patient presents with    Numbness     hands and feet, states was on Neurontin x2 months, doesn't feel like it helped      Reports that he has numbness and pins/needles of hands and feet. Sometimes burning in toes. Sometimes sharp pains. Gabapentin in the past.  Worked well initially but then stopped working. No change when he stopped the medication. Feels like metformin might be hurting nerves more. Pain with walking especially on hard ground at work. Reviewed PmHx, RxHx, FmHx, SocHx, AllgHx and updated and dated in the chart.     Review of Systems - negative except as listed above in the HPI    Objective:     Vitals:    06/22/21 1424   BP: 112/74   Pulse: 98   Temp: 98.8 °F (37.1 °C)   TempSrc: Oral SpO2: 96%   Weight: 158 lb (71.7 kg)   Height: 5' 6\" (1.676 m)       Current Outpatient Medications   Medication Sig    amLODIPine (NORVASC) 10 mg tablet TAKE 1 TABLET BY MOUTH EVERY DAY    metFORMIN ER (GLUCOPHAGE XR) 500 mg tablet TAKE 1 TABLET BY MOUTH EVERY DAY WITH DINNER    multivit-min-folic-vit K-lycop (ONE-A-DAY MEN'S MULTIVITAMIN) 400- mcg tab Take  by mouth.  pregabalin (LYRICA) 25 mg capsule Take 1 Capsule by mouth two (2) times a day. Max Daily Amount: 50 mg.    valsartan-hydroCHLOROthiazide (DIOVAN-HCT) 320-25 mg per tablet TAKE 1 TABLET BY MOUTH EVERY DAY (Patient not taking: Reported on 6/22/2021)     No current facility-administered medications for this visit. Physical Exam  Vitals and nursing note reviewed. Constitutional:       General: He is not in acute distress. Appearance: Normal appearance. He is not ill-appearing, toxic-appearing or diaphoretic. HENT:      Head: Normocephalic and atraumatic. Eyes:      General: No scleral icterus. Right eye: No discharge. Left eye: No discharge. Conjunctiva/sclera: Conjunctivae normal.   Cardiovascular:      Pulses:           Dorsalis pedis pulses are 1+ on the right side and 1+ on the left side. Posterior tibial pulses are 1+ on the right side and 1+ on the left side. Pulmonary:      Effort: Pulmonary effort is normal. No respiratory distress. Musculoskeletal:      Cervical back: No rigidity. Feet:      Right foot:      Skin integrity: Skin integrity normal. No ulcer. Toenail Condition: Right toenails are long. Left foot:      Skin integrity: Skin integrity normal. No ulcer. Toenail Condition: Left toenails are long. Neurological:      General: No focal deficit present. Mental Status: He is alert.               Arhtur Avendaño MD

## 2021-06-22 NOTE — PATIENT INSTRUCTIONS
Diabetic Neuropathy: Care Instructions  Your Care Instructions  When you have diabetes, your blood sugar level may get too high. Over time, high blood sugar levels can damage nerves. This is called diabetic neuropathy. Nerve damage can cause pain, burning, tingling, and numbness and may leave you feeling weak. The feet are often affected. When you have nerve damage in your feet, you cannot feel your feet and toes as well as normal and may not notice cuts or sores. Even a small injury can lead to a serious infection. It is very important that you follow your doctor's advice on foot care. Sometimes diabetes damages nerves that help the body function. If this happens, your blood pressure, sweating, digestion, and urination might be affected. Your doctor may give you a target blood sugar level that is higher or lower than you are used to. Try to keep your blood sugar very close to this target level to prevent more damage. Follow-up care is a key part of your treatment and safety. Be sure to make and go to all appointments, and call your doctor if you are having problems. It's also a good idea to know your test results and keep a list of the medicines you take. How can you care for yourself at home? · Take your medicines exactly as prescribed. Call your doctor if you think you are having a problem with your medicine. It is very important that you take your insulin or diabetes pills as your doctor tells you. · Try to keep blood sugar at your target level. ? Eat a variety of healthy foods, with carbohydrate spread out in your meals. A dietitian can help you plan meals. ? Try to get at least 30 minutes of exercise on most days. ? Check your blood sugar as many times each day as your doctor recommends. · Take and record your blood pressure at home if your doctor tells you to. Learn the importance of the two measures of blood pressure (such as 130 over 80, or 130/80). To take your blood pressure at home:  ?  Ask your doctor to check your blood pressure monitor to be sure it is accurate and the cuff fits you. Also ask your doctor to watch you to make sure that you are using it right. ? Do not use medicine known to raise blood pressure (such as some nasal decongestant sprays) before taking your blood pressure. ? Avoid taking your blood pressure if you have just exercised or are nervous or upset. Rest at least 15 minutes before you take a reading. · Take pain medicines exactly as directed. ? If the doctor gave you a prescription medicine for pain, take it as prescribed. ? If you are not taking a prescription pain medicine, ask your doctor if you can take an over-the-counter medicine. · Do not smoke. Smoking can increase your chance for a heart attack or stroke. If you need help quitting, talk to your doctor about stop-smoking programs and medicines. These can increase your chances of quitting for good. · Limit alcohol to 2 drinks a day for men and 1 drink a day for women. Too much alcohol can cause health problems. · Eat small meals often, rather than 2 or 3 large meals a day. To care for your feet  · Prevent injury by wearing shoes at all times, even when you are indoors. · Do foot care as part of your daily routine. Wash your feet and then rub lotion on your feet, but not between your toes. Use a handheld mirror or magnifying mirror to inspect your feet for blisters, cuts, cracks, or sores. · Have your toenails trimmed and filed straight across. · Wear shoes and socks that fit well. Soft shoes that have good support and that fit well (such as tennis shoes) are best for your feet. · Check your shoes for any loose objects or rough edges before you put them on. · Ask your doctor to check your feet during each visit. Your doctor may notice a foot problem you have missed. · Get early treatment for any foot problem, even a minor one. When should you call for help?    Call your doctor now or seek immediate medical care if:    · You have symptoms of infection, such as:  ? Increased pain, swelling, warmth, or redness. ? Red streaks leading from the area. ? Pus draining from the area. ? A fever.     · You have new or worse numbness, pain, or tingling in any part of your body. Watch closely for changes in your health, and be sure to contact your doctor if:    · You have a new problem with your feet, such as:  ? A new sore or ulcer. ? A break in the skin that is not healing after several days. ? Bleeding corns or calluses. ? An ingrown toenail.     · You do not get better as expected. Where can you learn more? Go to http://manjit-don.info/  Enter V828 in the search box to learn more about \"Diabetic Neuropathy: Care Instructions. \"  Current as of: August 31, 2020               Content Version: 12.8  © 7511-0412 [employer]. Care instructions adapted under license by [employer] (which disclaims liability or warranty for this information). If you have questions about a medical condition or this instruction, always ask your healthcare professional. [place] [address] any warranty or liability for your use of this information.

## 2021-06-22 NOTE — PROGRESS NOTES
Hong Flowers is a 57 y.o. male , id x 2(name and ). Reviewed record, history, and  medications. Chief Complaint   Patient presents with    Numbness     hands and feet, states was on Neurontin x2 months, doesn't feel like it helped        Vitals:    21 1424   BP: 112/74   Pulse: 98   Temp: 98.8 °F (37.1 °C)   TempSrc: Oral   SpO2: 96%   Weight: 158 lb (71.7 kg)   Height: 5' 6\" (1.676 m)       Coordination of Care Questionnaire:   1) Have you been to an emergency room, urgent care, or hospitalized since your last visit?   no       2. Have seen or consulted any other health care provider since your last visit? NO      3 most recent PHQ Screens 2021   Little interest or pleasure in doing things Not at all   Feeling down, depressed, irritable, or hopeless Not at all   Total Score PHQ 2 0       Patient is accompanied by self I have received verbal consent from Hong Flowers to discuss any/all medical information while they are present in the room.

## 2021-06-23 LAB
ALBUMIN SERPL-MCNC: 4.6 G/DL (ref 3.8–4.8)
ALBUMIN/GLOB SERPL: 2.1 {RATIO} (ref 1.2–2.2)
ALP SERPL-CCNC: 119 IU/L (ref 48–121)
ALT SERPL-CCNC: 11 IU/L (ref 0–44)
ANA SER QL: NEGATIVE
AST SERPL-CCNC: 20 IU/L (ref 0–40)
BASOPHILS # BLD AUTO: 0 X10E3/UL (ref 0–0.2)
BASOPHILS NFR BLD AUTO: 1 %
BILIRUB SERPL-MCNC: 0.4 MG/DL (ref 0–1.2)
BUN SERPL-MCNC: 13 MG/DL (ref 8–27)
BUN/CREAT SERPL: 12 (ref 10–24)
CALCIUM SERPL-MCNC: 9.4 MG/DL (ref 8.6–10.2)
CHLORIDE SERPL-SCNC: 103 MMOL/L (ref 96–106)
CO2 SERPL-SCNC: 24 MMOL/L (ref 20–29)
CREAT SERPL-MCNC: 1.08 MG/DL (ref 0.76–1.27)
CRP SERPL-MCNC: <1 MG/L (ref 0–10)
EOSINOPHIL # BLD AUTO: 0.1 X10E3/UL (ref 0–0.4)
EOSINOPHIL NFR BLD AUTO: 2 %
ERYTHROCYTE [DISTWIDTH] IN BLOOD BY AUTOMATED COUNT: 13.1 % (ref 11.6–15.4)
ERYTHROCYTE [SEDIMENTATION RATE] IN BLOOD BY WESTERGREN METHOD: 2 MM/HR (ref 0–30)
EST. AVERAGE GLUCOSE BLD GHB EST-MCNC: 123 MG/DL
GLOBULIN SER CALC-MCNC: 2.2 G/DL (ref 1.5–4.5)
GLUCOSE SERPL-MCNC: 154 MG/DL (ref 65–99)
HBA1C MFR BLD: 5.9 % (ref 4.8–5.6)
HCT VFR BLD AUTO: 43.7 % (ref 37.5–51)
HGB BLD-MCNC: 14.7 G/DL (ref 13–17.7)
IMM GRANULOCYTES # BLD AUTO: 0 X10E3/UL (ref 0–0.1)
IMM GRANULOCYTES NFR BLD AUTO: 1 %
LYMPHOCYTES # BLD AUTO: 1.1 X10E3/UL (ref 0.7–3.1)
LYMPHOCYTES NFR BLD AUTO: 27 %
MCH RBC QN AUTO: 30.2 PG (ref 26.6–33)
MCHC RBC AUTO-ENTMCNC: 33.6 G/DL (ref 31.5–35.7)
MCV RBC AUTO: 90 FL (ref 79–97)
MONOCYTES # BLD AUTO: 0.3 X10E3/UL (ref 0.1–0.9)
MONOCYTES NFR BLD AUTO: 7 %
NEUTROPHILS # BLD AUTO: 2.6 X10E3/UL (ref 1.4–7)
NEUTROPHILS NFR BLD AUTO: 62 %
PLATELET # BLD AUTO: 291 X10E3/UL (ref 150–450)
POTASSIUM SERPL-SCNC: 3.9 MMOL/L (ref 3.5–5.2)
PROT SERPL-MCNC: 6.8 G/DL (ref 6–8.5)
RBC # BLD AUTO: 4.87 X10E6/UL (ref 4.14–5.8)
SODIUM SERPL-SCNC: 139 MMOL/L (ref 134–144)
TSH SERPL DL<=0.005 MIU/L-ACNC: 2.51 UIU/ML (ref 0.45–4.5)
VIT B12 SERPL-MCNC: 1421 PG/ML (ref 232–1245)
WBC # BLD AUTO: 4.2 X10E3/UL (ref 3.4–10.8)

## 2021-06-24 DIAGNOSIS — G62.9 NEUROPATHY: Primary | ICD-10-CM

## 2021-06-24 RX ORDER — PREGABALIN 25 MG/1
25 CAPSULE ORAL 2 TIMES DAILY
Qty: 60 CAPSULE | Refills: 0 | Status: SHIPPED | OUTPATIENT
Start: 2021-06-24 | End: 2021-07-27

## 2021-06-24 NOTE — PROGRESS NOTES
Normal blood counts, thyroid, inflammatory markers, vitamin B12 level. No explanation on labs other than diabetes to explain his neuropathy. Normal metabolic panel, glucose elevated but he was not fasting. Kidney function improved from 5 months ago. Will start Lyrica for the neuropathy, sent to pharmacy. Follow-up in 2 to 4 weeks  Hemoglobin A1c is 5.9, which shows that diabetes is well controlled. He may try coming off the Metformin if he would like to just focus on healthy diet and exercise.   We should recheck the A1c in 3 months if he comes off the Metformin

## 2021-06-24 NOTE — PROGRESS NOTES
Pt id x 3, notified as per Dr. Valentin Law and verbalized understanding. Pt already has 4 week follow up with Alis Saha scheduled.

## 2021-07-27 ENCOUNTER — OFFICE VISIT (OUTPATIENT)
Dept: FAMILY MEDICINE CLINIC | Age: 61
End: 2021-07-27
Payer: COMMERCIAL

## 2021-07-27 VITALS
DIASTOLIC BLOOD PRESSURE: 79 MMHG | OXYGEN SATURATION: 98 % | HEART RATE: 72 BPM | BODY MASS INDEX: 24.43 KG/M2 | TEMPERATURE: 98.1 F | SYSTOLIC BLOOD PRESSURE: 126 MMHG | WEIGHT: 152 LBS | RESPIRATION RATE: 16 BRPM | HEIGHT: 66 IN

## 2021-07-27 DIAGNOSIS — Z00.00 WELL ADULT EXAM: Primary | ICD-10-CM

## 2021-07-27 DIAGNOSIS — E78.00 HYPERCHOLESTEREMIA: ICD-10-CM

## 2021-07-27 DIAGNOSIS — G62.9 PERIPHERAL POLYNEUROPATHY: ICD-10-CM

## 2021-07-27 DIAGNOSIS — E11.9 TYPE 2 DIABETES MELLITUS WITHOUT COMPLICATION, WITHOUT LONG-TERM CURRENT USE OF INSULIN (HCC): ICD-10-CM

## 2021-07-27 DIAGNOSIS — G62.9 NEUROPATHY: ICD-10-CM

## 2021-07-27 DIAGNOSIS — Z12.5 SCREENING PSA (PROSTATE SPECIFIC ANTIGEN): ICD-10-CM

## 2021-07-27 PROCEDURE — 99396 PREV VISIT EST AGE 40-64: CPT | Performed by: NURSE PRACTITIONER

## 2021-07-27 RX ORDER — PREGABALIN 75 MG/1
75 CAPSULE ORAL 2 TIMES DAILY
Qty: 60 CAPSULE | Refills: 2 | Status: SHIPPED | OUTPATIENT
Start: 2021-07-27 | End: 2021-08-10

## 2021-07-27 NOTE — PROGRESS NOTES
Subjective:   Sinai Stephens is a 57 y.o. male presenting for his annual checkup. ROS:  Feeling well. No dyspnea or chest pain on exertion. No abdominal pain, change in bowel habits, black or bloody stools. No urinary tract or prostatic symptoms. No neurological complaints. Specific concerns today: having continued neuropathy, only started 25mg once a day of Lyrica and has done nothing. Reviewed labs from last mo and nothing acutely wrong. Patient Active Problem List    Diagnosis Date Noted    Type 2 diabetes mellitus 07/27/2021    Low TSH level 11/08/2019    Essential hypertension 06/15/2017    Prostate cancer ([facility] [address] 12/29/2015     Current Outpatient Medications   Medication Sig Dispense Refill    pregabalin (LYRICA) 75 mg capsule Take 1 Capsule by mouth two (2) times a day. Max Daily Amount: 150 mg. 60 Capsule 2    amLODIPine (NORVASC) 10 mg tablet TAKE 1 TABLET BY MOUTH EVERY DAY 90 Tab 2    multivit-min-folic-vit K-lycop (ONE-A-DAY MEN'S MULTIVITAMIN) 400- mcg tab Take  by mouth. Family History   Problem Relation Age of Onset    Diabetes Mother     Hypertension Mother    [facility] Anesth Problems Neg Hx      Social History     Tobacco Use    Smoking status: Never Smoker    Smokeless tobacco: Never Used   Substance Use Topics    Alcohol use: Yes     Comment: 3 WEEK             Objective:     Visit Vitals  /79 (BP 1 Location: Left upper arm, BP Patient Position: Sitting)   Pulse 72   Temp 98.1 °F (36.7 °C) (Oral)   Resp 16   Ht 5' 6\" (1.676 m)   Wt 152 lb (68.9 kg)   SpO2 98%   BMI 24.53 kg/m²     The patient appears well, alert, oriented x 3, in no distress. ENT normal.  Neck supple. No adenopathy or thyromegaly. JESSICA. Lungs are clear, good air entry, no wheezes, rhonchi or rales. S1 and S2 normal, no murmurs, regular rate and rhythm. Abdomen is soft without tenderness, guarding, mass or organomegaly.  exam: deferred. Extremities show no edema, normal peripheral pulses. Neurological is normal without focal findings. Assessment/Plan:   healthy adult male  lose weight, increase physical activity, follow low fat diet, follow low salt diet, routine labs ordered. Encounter Diagnoses   Name Primary?  Well adult exam Yes    Neuropathy     Screening PSA (prostate specific antigen)     Peripheral polyneuropathy     Hypercholesteremia     Type 2 diabetes mellitus without complication, without long-term current use of insulin (HCC)      Orders Placed This Encounter    VITAMIN D, 25 HYDROXY    PSA SCREENING (SCREENING)    LIPID PANEL    pregabalin (LYRICA) 75 mg capsule   . Restart Lyrica at 75mg bid  Labs updated as well for wellness labs that were not done last mo  bp stable  I have discussed the diagnosis with the patient and the intended plan as seen in the above orders. The patient has received an after-visit summary and questions were answered concerning future plans. Patient conveyed understanding of the plan at the time of the visit.     Jewels Masterson, MSN, ANP  7/27/2021

## 2021-07-27 NOTE — PROGRESS NOTES
Chief Complaint   Patient presents with    Complete Physical    Labs     Pt in office today for cpe  -labs    1. Have you been to the ER, urgent care clinic since your last visit? Hospitalized since your last visit? No    2. Have you seen or consulted any other health care providers outside of the [address] [place] [address] since your last visit? Include any pap smears or colon screening.  No     Pt has no other concerns

## 2021-07-28 LAB
25(OH)D3+25(OH)D2 SERPL-MCNC: 36.1 NG/ML (ref 30–100)
CHOLEST SERPL-MCNC: 204 MG/DL (ref 100–199)
HDLC SERPL-MCNC: 65 MG/DL
IMP & REVIEW OF LAB RESULTS: NORMAL
LDLC SERPL CALC-MCNC: 130 MG/DL (ref 0–99)
PSA SERPL-MCNC: <0.1 NG/ML (ref 0–4)
TRIGL SERPL-MCNC: 50 MG/DL (ref 0–149)
VLDLC SERPL CALC-MCNC: 9 MG/DL (ref 5–40)

## 2021-08-01 NOTE — PROGRESS NOTES
RECOMMENDATIONS:  None. Keep up the good work! Work on diet and exercise. Your cholesterol is borderline high. Watch the diet for red meat/pork, dairy products, and fried/fast foods. Recheck this test: 6 months.

## 2021-08-10 ENCOUNTER — TELEPHONE (OUTPATIENT)
Dept: FAMILY MEDICINE CLINIC | Age: 61
End: 2021-08-10

## 2021-08-10 DIAGNOSIS — G62.9 PERIPHERAL POLYNEUROPATHY: ICD-10-CM

## 2021-08-10 DIAGNOSIS — G62.9 NEUROPATHY: ICD-10-CM

## 2021-08-10 RX ORDER — PREGABALIN 100 MG/1
100 CAPSULE ORAL 3 TIMES DAILY
Qty: 90 CAPSULE | Refills: 2 | Status: SHIPPED | OUTPATIENT
Start: 2021-08-10 | End: 2022-08-22

## 2021-08-10 NOTE — TELEPHONE ENCOUNTER
Pt states he would like to increase dose of lyrica-currently taking 75mg in am and pm.  States pain had improved for the first week,but pain has now returned to previous level of pain.

## 2021-11-03 RX ORDER — AMLODIPINE BESYLATE 10 MG/1
TABLET ORAL
Qty: 90 TABLET | Refills: 2 | Status: SHIPPED | OUTPATIENT
Start: 2021-11-03 | End: 2022-08-08

## 2022-01-06 ENCOUNTER — OFFICE VISIT (OUTPATIENT)
Dept: FAMILY MEDICINE CLINIC | Age: 62
End: 2022-01-06
Payer: COMMERCIAL

## 2022-01-06 VITALS
WEIGHT: 151 LBS | RESPIRATION RATE: 16 BRPM | BODY MASS INDEX: 24.27 KG/M2 | HEART RATE: 85 BPM | SYSTOLIC BLOOD PRESSURE: 105 MMHG | OXYGEN SATURATION: 98 % | DIASTOLIC BLOOD PRESSURE: 68 MMHG | TEMPERATURE: 98.4 F | HEIGHT: 66 IN

## 2022-01-06 DIAGNOSIS — C61 PROSTATE CANCER (HCC): ICD-10-CM

## 2022-01-06 DIAGNOSIS — E11.9 TYPE 2 DIABETES MELLITUS WITHOUT COMPLICATION, WITHOUT LONG-TERM CURRENT USE OF INSULIN (HCC): ICD-10-CM

## 2022-01-06 DIAGNOSIS — I10 ESSENTIAL HYPERTENSION: Primary | ICD-10-CM

## 2022-01-06 PROCEDURE — 99214 OFFICE O/P EST MOD 30 MIN: CPT | Performed by: NURSE PRACTITIONER

## 2022-01-06 NOTE — PROGRESS NOTES
Chief Complaint   Patient presents with    Labs     Pt being seen for labs    1. Have you been to the ER, urgent care clinic since your last visit? Hospitalized since your last visit? No    2. Have you seen or consulted any other health care providers outside of the [address] [place] [address] since your last visit? Include any pap smears or colon screening.  No     Pt has no other concerns

## 2022-01-07 LAB
ALBUMIN SERPL-MCNC: 4.3 G/DL (ref 3.8–4.8)
ALBUMIN/GLOB SERPL: 2 {RATIO} (ref 1.2–2.2)
ALP SERPL-CCNC: 116 IU/L (ref 44–121)
ALT SERPL-CCNC: 12 IU/L (ref 0–44)
AST SERPL-CCNC: 17 IU/L (ref 0–40)
BASOPHILS # BLD AUTO: 0 X10E3/UL (ref 0–0.2)
BASOPHILS NFR BLD AUTO: 1 %
BILIRUB SERPL-MCNC: 0.4 MG/DL (ref 0–1.2)
BUN SERPL-MCNC: 12 MG/DL (ref 8–27)
BUN/CREAT SERPL: 12 (ref 10–24)
CALCIUM SERPL-MCNC: 9.3 MG/DL (ref 8.6–10.2)
CHLORIDE SERPL-SCNC: 106 MMOL/L (ref 96–106)
CO2 SERPL-SCNC: 26 MMOL/L (ref 20–29)
CREAT SERPL-MCNC: 1.02 MG/DL (ref 0.76–1.27)
EOSINOPHIL # BLD AUTO: 0.1 X10E3/UL (ref 0–0.4)
EOSINOPHIL NFR BLD AUTO: 3 %
ERYTHROCYTE [DISTWIDTH] IN BLOOD BY AUTOMATED COUNT: 13.2 % (ref 11.6–15.4)
EST. AVERAGE GLUCOSE BLD GHB EST-MCNC: 117 MG/DL
GLOBULIN SER CALC-MCNC: 2.1 G/DL (ref 1.5–4.5)
GLUCOSE SERPL-MCNC: 126 MG/DL (ref 65–99)
HBA1C MFR BLD: 5.7 % (ref 4.8–5.6)
HCT VFR BLD AUTO: 42.7 % (ref 37.5–51)
HGB BLD-MCNC: 14.6 G/DL (ref 13–17.7)
IMM GRANULOCYTES # BLD AUTO: 0 X10E3/UL (ref 0–0.1)
IMM GRANULOCYTES NFR BLD AUTO: 0 %
LYMPHOCYTES # BLD AUTO: 1.3 X10E3/UL (ref 0.7–3.1)
LYMPHOCYTES NFR BLD AUTO: 35 %
MCH RBC QN AUTO: 30.6 PG (ref 26.6–33)
MCHC RBC AUTO-ENTMCNC: 34.2 G/DL (ref 31.5–35.7)
MCV RBC AUTO: 90 FL (ref 79–97)
MONOCYTES # BLD AUTO: 0.3 X10E3/UL (ref 0.1–0.9)
MONOCYTES NFR BLD AUTO: 8 %
NEUTROPHILS # BLD AUTO: 2 X10E3/UL (ref 1.4–7)
NEUTROPHILS NFR BLD AUTO: 53 %
PLATELET # BLD AUTO: 290 X10E3/UL (ref 150–450)
POTASSIUM SERPL-SCNC: 4.3 MMOL/L (ref 3.5–5.2)
PROT SERPL-MCNC: 6.4 G/DL (ref 6–8.5)
PSA SERPL-MCNC: <0.1 NG/ML (ref 0–4)
RBC # BLD AUTO: 4.77 X10E6/UL (ref 4.14–5.8)
SODIUM SERPL-SCNC: 143 MMOL/L (ref 134–144)
WBC # BLD AUTO: 3.7 X10E3/UL (ref 3.4–10.8)

## 2022-01-10 NOTE — PROGRESS NOTES
HISTORY OF PRESENT ILLNESS  Sara Ko is a 57 y.o. male. HPI  Cardiovascular Review:  The patient has hypertension. Diet and Lifestyle: generally follows a low fat low cholesterol diet, generally follows a low sodium diet, sedentary, nonsmoker  Home BP Monitoring: is not measured at home. Pertinent ROS: taking medications as instructed, no medication side effects noted, no TIA's, no chest pain on exertion, no dyspnea on exertion, no swelling of ankles. Diabetes Mellitus:  He has diabetes mellitus, and  hyperlipidemia. Diabetic ROS - medication compliance: compliant all of the time, diabetic diet compliance: compliant most of the time, home glucose monitoring: is not performed. Lab review: orders written for new lab studies as appropriate; see orders. Prostate CA:  Due for repeat PSA, does have several trips to bathroom at night    Patient Active Problem List    Diagnosis Date Noted    Type 2 diabetes mellitus 07/27/2021    Low TSH level 11/08/2019    Essential hypertension 06/15/2017    Prostate cancer ([facility] [address] 12/29/2015     Current Outpatient Medications   Medication Sig Dispense Refill    amLODIPine (NORVASC) 10 mg tablet TAKE 1 TABLET BY MOUTH EVERY DAY 90 Tablet 2    multivit-min-folic-vit K-lycop (ONE-A-DAY MEN'S MULTIVITAMIN) 400- mcg tab Take  by mouth.  pregabalin (LYRICA) 100 mg capsule Take 1 Capsule by mouth three (3) times daily. Max Daily Amount: 300 mg.  (Patient not taking: Reported on 1/6/2022) 80 Capsule 2     Family History   Problem Relation Age of Onset    Diabetes Mother     Hypertension Mother     Anesth Problems Neg Hx      Social History     Tobacco Use    Smoking status: Never Smoker    Smokeless tobacco: Never Used   Substance Use Topics    Alcohol use: Yes     Comment: 3 WEEK           ROS  A comprehensive review of system was obtained and negative except findings in the HPI    Visit Vitals  /68 (BP 1 Location: Left upper arm, BP Patient Position: Sitting)   Pulse 85   Temp 98.4 °F (36.9 °C) (Oral)   Resp 16   Ht 5' 6\" (1.676 m)   Wt 151 lb (68.5 kg)   SpO2 98%   BMI 24.37 kg/m²     Physical Exam  Vitals and nursing note reviewed. Constitutional:       Appearance: Normal appearance. Cardiovascular:      Rate and Rhythm: Normal rate and regular rhythm. Heart sounds: Normal heart sounds. Musculoskeletal:         General: No swelling. Neurological:      Mental Status: He is alert. ASSESSMENT and PLAN  Encounter Diagnoses   Name Primary?  Essential hypertension Yes    Prostate cancer ([facility] [address]     Type 2 diabetes mellitus without complication, without long-term current use of insulin ([address]      Orders Placed This Encounter    PSA SCREENING (SCREENING)    METABOLIC PANEL, COMPREHENSIVE    CBC WITH AUTOMATED DIFF    HEMOGLOBIN A1C WITH EAG     Labs updated today  Dev plan with results    I have discussed the diagnosis with the patient and the intended plan as seen in the above orders. The patient has received an after-visit summary and questions were answered concerning future plans. Patient conveyed understanding of the plan at the time of the visit.     Idalmis Bates, MSN, ANP  1/9/2022

## 2022-03-19 PROBLEM — E11.9 TYPE 2 DIABETES MELLITUS (HCC): Status: ACTIVE | Noted: 2021-07-27

## 2022-03-19 PROBLEM — R79.89 LOW TSH LEVEL: Status: ACTIVE | Noted: 2019-11-08

## 2022-03-19 PROBLEM — I10 ESSENTIAL HYPERTENSION: Status: ACTIVE | Noted: 2017-06-15

## 2022-05-20 NOTE — PROGRESS NOTES
Chief Complaint   Patient presents with    Thumb Pain     Patient in office today for f/u on thumb injury. Pt states he was seen at [facility] on Saturday, 13 stitches was placed in left thumb. Pt received tdap vaccine and bacitracin ointment. Pt was advised to leave stitches in for 10 days, today is day 5. Subjective: (As above and below)     Chief Complaint   Patient presents with    Thumb Pain     he is a 60y.o. year old male who presents for evaluation. Reviewed PmHx, RxHx, FmHx, SocHx, AllgHx and updated in chart. Review of Systems - negative except as listed above    Objective:     Vitals:    06/13/18 1122   BP: 123/82   Pulse: 87   Resp: 18   Temp: 98.7 °F (37.1 °C)   TempSrc: Oral   SpO2: 100%   Weight: 156 lb (70.8 kg)   Height: 5' 6\" (1.676 m)     Physical Examination: General appearance - alert, well appearing, and in no distress  Mental status - normal mood, behavior, speech, dress, motor activity, and thought processes  Chest - clear to auscultation, no wheezes, rales or rhonchi, symmetric air entry  Heart - normal rate, regular rhythm, normal S1, S2, no murmurs, rubs, clicks or gallops  Skin - lineaer laceration to left thumb, sutures in place, clean, dry, no erythema noted    Assessment/ Plan:   1. Laceration of left thumb without foreign body without damage to nail, initial encounter  -healing well, return in 5 days for suture removal.        I have discussed the diagnosis with the patient and the intended plan as seen in the above orders. The patient has received an after-visit summary and questions were answered concerning future plans.      Medication Side Effects and Warnings were discussed with patient: yes  Patient Labs were reviewed: yes  Patient Past Records were reviewed:  yes    Hai Rodriguez M.D. SUBJECTIVE:   CC: Jeramie Sánchez is an 61 year old male who presents for preventative health visit.       Patient has been advised of split billing requirements and indicates understanding: Yes  Healthy Habits:     Getting at least 3 servings of Calcium per day:  Yes    Bi-annual eye exam:  Yes    Dental care twice a year:  NO    Sleep apnea or symptoms of sleep apnea:  Sleep apnea    Diet:  Regular (no restrictions)    Frequency of exercise:  2-3 days/week    Duration of exercise:  Greater than 60 minutes    Taking medications regularly:  Yes    Medication side effects:  None    PHQ-2 Total Score: 0    Additional concerns today:  No    Ability to successfully perform activities of daily living: Yes, no assistance needed  Home safety:  none identified   Hearing impairment: no         PROBLEMS TO ADD ON...  Has h/o HTN. on medical treatment. BP has been controlled. No side effects from medications. No CP, HA, dizziness. good compliance with medications and low salt diet.      Today's PHQ-2 Score:   PHQ-2 ( 1999 [employer]) 5/20/2022   Q1: Little interest or pleasure in doing things 0   Q2: Feeling down, depressed or hopeless 0   PHQ-2 Score 0   PHQ-2 Total Score (12-17 Years)- Positive if 3 or more points; Administer PHQ-A if positive -   Q1: Little interest or pleasure in doing things Not at all   Q2: Feeling down, depressed or hopeless Not at all   PHQ-2 Score 0       Abuse: Current or Past(Physical, Sexual or Emotional)- No  Do you feel safe in your environment? Yes    Have you ever done Advance Care Planning? (For example, a Health Directive, POLST, or a discussion with a medical provider or your loved ones about your wishes): No, advance care planning information given to patient to review.  Advanced care planning was discussed at today's visit.    Social History     Tobacco Use     Smoking status: Never Smoker     Smokeless tobacco: Never Used   Substance Use Topics     Alcohol use: Not Currently     If you drink  "alcohol do you typically have >3 drinks per day or >7 drinks per week? No    Alcohol Use 5/20/2022   Prescreen: >3 drinks/day or >7 drinks/week? No   Prescreen: >3 drinks/day or >7 drinks/week? -   No flowsheet data found.    Last PSA:   PSA   Date Value Ref Range Status   04/05/2021 0.02 0 - 4 ug/L Final     Comment:     Assay Method:  Chemiluminescence using Siemens Vista analyzer       Reviewed orders with patient. Reviewed health maintenance and updated orders accordingly - Yes  Lab work is in process  Labs reviewed in EPIC    Reviewed and updated as needed this visit by clinical staff   Tobacco  Allergies  Meds   Med Hx  Surg Hx  Fam Hx  Soc Hx          Reviewed and updated as needed this visit by Provider                       Review of Systems   Constitutional: Negative for chills and fever.   HENT: Negative for congestion, ear pain, hearing loss and sore throat.    Eyes: Negative for pain and visual disturbance.   Respiratory: Negative for cough and shortness of breath.    Cardiovascular: Negative for chest pain, palpitations and peripheral edema.   Gastrointestinal: Negative for abdominal pain, constipation, diarrhea, heartburn, hematochezia and nausea.   Genitourinary: Negative for dysuria, frequency, genital sores, hematuria, impotence and penile discharge.   Musculoskeletal: Negative for arthralgias, joint swelling and myalgias.   Skin: Negative for rash.   Neurological: Negative for dizziness, weakness, headaches and paresthesias.   Psychiatric/Behavioral: Negative for mood changes. The patient is not nervous/anxious.          OBJECTIVE:   /82   Pulse 61   Temp 97.6  F (36.4  C) (Oral)   Resp 16   Ht 1.778 m (5' 10\")   Wt 94.1 kg (207 lb 8 oz)   SpO2 99%   BMI 29.77 kg/m      Physical Exam  GENERAL: healthy, alert and no distress  EYES: Eyes grossly normal to inspection, PERRL and conjunctivae and sclerae normal  HENT: ear canals and TM's normal, nose and mouth without ulcers or " lesions  NECK: no adenopathy, no asymmetry, masses, or scars and thyroid normal to palpation  RESP: lungs clear to auscultation - no rales, rhonchi or wheezes  CV: regular rate and rhythm, normal S1 S2, no S3 or S4, no murmur, click or rub, no peripheral edema and peripheral pulses strong  ABDOMEN: soft, nontender, no hepatosplenomegaly, no masses and bowel sounds normal, dehiscence of the abdominal muscles above the umbilicus   MS: no gross musculoskeletal defects noted, no edema, LE varicose veins, extensive, no edema, no pain   SKIN: no suspicious lesions or rashes  NEURO: Normal strength and tone, mentation intact and speech normal  PSYCH: mentation appears normal, affect normal/bright    Diagnostic Test Results:  Labs reviewed in Epic    ASSESSMENT/PLAN:       ICD-10-CM    1. Encounter for preventative adult health care examination  Z00.00 Adult Gastro Ref - Procedure Only     Lipid panel reflex to direct LDL Fasting     CBC with platelets     Comprehensive metabolic panel (BMP + Alb, Alk Phos, ALT, AST, Total. Bili, TP)     PSA, screen     TSH with free T4 reflex     UA with Microscopic reflex to Culture - lab collect   2. Screening for HIV (human immunodeficiency virus)  Z11.4 HIV Antigen Antibody Combo   3. Need for hepatitis C screening test  Z11.59 Hepatitis C Screen Reflex to HCV RNA Quant and Genotype   4. Benign essential hypertension  I10 Adult Gastro Ref - Procedure Only     lisinopril-hydrochlorothiazide (ZESTORETIC) 20-25 MG tablet     Lipid panel reflex to direct LDL Fasting     CBC with platelets     Comprehensive metabolic panel (BMP + Alb, Alk Phos, ALT, AST, Total. Bili, TP)     TSH with free T4 reflex     UA with Microscopic reflex to Culture - lab collect   5. Screening for prostate cancer  Z12.5 PSA, screen       Patient has been advised of split billing requirements and indicates understanding: Yes    COUNSELING:   Reviewed preventive health counseling, as reflected in patient  "instructions       Regular exercise       Healthy diet/nutrition       Vision screening       Hearing screening       Colorectal cancer screening       Prostate cancer screening    Estimated body mass index is 29.77 kg/m  as calculated from the following:    Height as of this encounter: 1.778 m (5' 10\").    Weight as of this encounter: 94.1 kg (207 lb 8 oz).     Weight management plan: Discussed healthy diet and exercise guidelines    He reports that he has never smoked. He has never used smokeless tobacco.      Counseling Resources:  ATP IV Guidelines  Pooled Cohorts Equation Calculator  FRAX Risk Assessment  ICSI Preventive Guidelines  Dietary Guidelines for Americans, 2010  USDA's MyPlate  ASA Prophylaxis  Lung CA Screening    Maninder Espinoza MD  [facility]  "

## 2022-08-08 RX ORDER — AMLODIPINE BESYLATE 10 MG/1
TABLET ORAL
Qty: 90 TABLET | Refills: 2 | Status: SHIPPED | OUTPATIENT
Start: 2022-08-08

## 2022-08-22 ENCOUNTER — OFFICE VISIT (OUTPATIENT)
Dept: FAMILY MEDICINE CLINIC | Age: 62
End: 2022-08-22
Payer: COMMERCIAL

## 2022-08-22 VITALS
WEIGHT: 149 LBS | HEIGHT: 66 IN | DIASTOLIC BLOOD PRESSURE: 78 MMHG | HEART RATE: 68 BPM | BODY MASS INDEX: 23.95 KG/M2 | OXYGEN SATURATION: 99 % | RESPIRATION RATE: 16 BRPM | SYSTOLIC BLOOD PRESSURE: 133 MMHG | TEMPERATURE: 97.9 F

## 2022-08-22 DIAGNOSIS — R79.89 LOW TSH LEVEL: ICD-10-CM

## 2022-08-22 DIAGNOSIS — E11.9 TYPE 2 DIABETES MELLITUS WITHOUT COMPLICATION, WITHOUT LONG-TERM CURRENT USE OF INSULIN (HCC): ICD-10-CM

## 2022-08-22 DIAGNOSIS — C61 PROSTATE CANCER (HCC): ICD-10-CM

## 2022-08-22 DIAGNOSIS — Z00.00 ROUTINE GENERAL MEDICAL EXAMINATION AT A HEALTH CARE FACILITY: Primary | ICD-10-CM

## 2022-08-22 DIAGNOSIS — I10 ESSENTIAL HYPERTENSION: ICD-10-CM

## 2022-08-22 PROCEDURE — 99396 PREV VISIT EST AGE 40-64: CPT | Performed by: FAMILY MEDICINE

## 2022-08-22 NOTE — PROGRESS NOTES
Chief Complaint   Patient presents with    Physical    Diabetes    Hypertension    Labs     1. Have you been to the ER, urgent care clinic since your last visit? Hospitalized since your last visit? No    2. Have you seen or consulted any other health care providers outside of the [address] [place] [address] since your last visit? Include any pap smears or colon screening.  No

## 2022-08-22 NOTE — PROGRESS NOTES
Chief Complaint   Patient presents with    Physical    Diabetes    Hypertension    Labs     1. Have you been to the ER, urgent care clinic since your last visit? Hospitalized since your last visit? No    2. Have you seen or consulted any other health care providers outside of the [address] [place] [address] since your last visit? Include any pap smears or colon screening. No      Chief Complaint   Patient presents with    Physical    Diabetes    Hypertension    Labs     he is a 60y.o. year old male who presents for evalution. Reviewed PmHx, RxHx, FmHx, SocHx, AllgHx and updated and dated in the chart. Patient Active Problem List    Diagnosis    Type 2 diabetes mellitus    Low TSH level    Essential hypertension    Prostate cancer ([facility] [address]       Review of Systems - negative except as listed above in the HPI    Objective:     Vitals:    08/22/22 1106   BP: (!) 158/77   Pulse: 68   Resp: 16   Temp: 97.9 °F (36.6 °C)   TempSrc: Oral   SpO2: 99%   Weight: 149 lb (67.6 kg)   Height: 5' 6\" (1.676 m)     Physical Examination: General appearance - alert, well appearing, and in no distress  Chest - clear to auscultation, no wheezes, rales or rhonchi, symmetric air entry  Heart - normal rate, regular rhythm, normal S1, S2, no murmurs, rubs, clicks or gallops    Assessment/ Plan:   Diagnoses and all orders for this visit:    1. Routine general medical examination at a health care facility  -     LIPID PANEL; Future  -     METABOLIC PANEL, COMPREHENSIVE; Future  -     CBC WITH AUTOMATED DIFF; Future  -     TSH 3RD GENERATION; Future  -     HEMOGLOBIN A1C WITH EAG; Future  -     PSA W/ REFLX FREE PSA; Future  -     MICROALBUMIN, UR, RAND W/ MICROALB/CREAT RATIO; Future  -see below    2. Type 2 diabetes mellitus without complication, without long-term current use of insulin (HCC)  -     LIPID PANEL; Future  -     METABOLIC PANEL, COMPREHENSIVE; Future  -     CBC WITH AUTOMATED DIFF; Future  -     TSH 3RD GENERATION;  Future  - HEMOGLOBIN A1C WITH EAG; Future  -     MICROALBUMIN, UR, RAND W/ MICROALB/CREAT RATIO; Future  Lab Results   Component Value Date/Time    Hemoglobin A1c 5.7 (H) 01/06/2022 12:00 AM       3. Essential hypertension  -     LIPID PANEL; Future  -     METABOLIC PANEL, COMPREHENSIVE; Future  -inc first check    4. Prostate cancer ([facility] [address]  -     PSA W/ REFLX FREE PSA; Future    5. Low TSH level  -     TSH 3RD GENERATION; Future       -Patient is in good health  -Discussed with patient cancer risk factors and screens needed  -Colonoscopy was recommended based on current guidelines for screening.  -Labs from previous visits were discussed with patient yes  -Discussed with patient diet and exercise  -Immunizations appropriate for age were discussed with pt and updated  -    I have discussed the diagnosis with the patient and the intended plan as seen in the above orders. The patient understands and agrees with the plan. The patient has received an after-visit summary and questions were answered concerning future plans. Medication Side Effects and Warnings were discussed with patient  Patient Labs were reviewed and or requested  Patient Past Records were reviewed and or requested     There are no Patient Instructions on file for this visit.         Umer Lord M.D.

## 2022-08-23 LAB
ALBUMIN SERPL-MCNC: 4.6 G/DL (ref 3.8–4.8)
ALBUMIN/CREAT UR: 3 MG/G CREAT (ref 0–29)
ALBUMIN/GLOB SERPL: 1.8 {RATIO} (ref 1.2–2.2)
ALP SERPL-CCNC: 124 IU/L (ref 44–121)
ALT SERPL-CCNC: 13 IU/L (ref 0–44)
AST SERPL-CCNC: 17 IU/L (ref 0–40)
BASOPHILS # BLD AUTO: 0 X10E3/UL (ref 0–0.2)
BASOPHILS NFR BLD AUTO: 1 %
BILIRUB SERPL-MCNC: 0.5 MG/DL (ref 0–1.2)
BUN SERPL-MCNC: 11 MG/DL (ref 8–27)
BUN/CREAT SERPL: 9 (ref 10–24)
CALCIUM SERPL-MCNC: 9.8 MG/DL (ref 8.6–10.2)
CHLORIDE SERPL-SCNC: 104 MMOL/L (ref 96–106)
CHOLEST SERPL-MCNC: 195 MG/DL (ref 100–199)
CO2 SERPL-SCNC: 23 MMOL/L (ref 20–29)
CREAT SERPL-MCNC: 1.2 MG/DL (ref 0.76–1.27)
CREAT UR-MCNC: 164.1 MG/DL
EGFR: 68 ML/MIN/1.73
EOSINOPHIL # BLD AUTO: 0.1 X10E3/UL (ref 0–0.4)
EOSINOPHIL NFR BLD AUTO: 1 %
ERYTHROCYTE [DISTWIDTH] IN BLOOD BY AUTOMATED COUNT: 13.4 % (ref 11.6–15.4)
EST. AVERAGE GLUCOSE BLD GHB EST-MCNC: 126 MG/DL
GLOBULIN SER CALC-MCNC: 2.5 G/DL (ref 1.5–4.5)
GLUCOSE SERPL-MCNC: 96 MG/DL (ref 65–99)
HBA1C MFR BLD: 6 % (ref 4.8–5.6)
HCT VFR BLD AUTO: 46.8 % (ref 37.5–51)
HDLC SERPL-MCNC: 77 MG/DL
HGB BLD-MCNC: 15.5 G/DL (ref 13–17.7)
IMM GRANULOCYTES # BLD AUTO: 0 X10E3/UL (ref 0–0.1)
IMM GRANULOCYTES NFR BLD AUTO: 0 %
IMP & REVIEW OF LAB RESULTS: NORMAL
LDLC SERPL CALC-MCNC: 110 MG/DL (ref 0–99)
LYMPHOCYTES # BLD AUTO: 1.4 X10E3/UL (ref 0.7–3.1)
LYMPHOCYTES NFR BLD AUTO: 33 %
MCH RBC QN AUTO: 30 PG (ref 26.6–33)
MCHC RBC AUTO-ENTMCNC: 33.1 G/DL (ref 31.5–35.7)
MCV RBC AUTO: 91 FL (ref 79–97)
MICROALBUMIN UR-MCNC: 4.4 UG/ML
MONOCYTES # BLD AUTO: 0.3 X10E3/UL (ref 0.1–0.9)
MONOCYTES NFR BLD AUTO: 7 %
NEUTROPHILS # BLD AUTO: 2.5 X10E3/UL (ref 1.4–7)
NEUTROPHILS NFR BLD AUTO: 58 %
PLATELET # BLD AUTO: 280 X10E3/UL (ref 150–450)
POTASSIUM SERPL-SCNC: 4.3 MMOL/L (ref 3.5–5.2)
PROT SERPL-MCNC: 7.1 G/DL (ref 6–8.5)
PSA SERPL-MCNC: <0.1 NG/ML (ref 0–4)
RBC # BLD AUTO: 5.16 X10E6/UL (ref 4.14–5.8)
REFLEX CRITERIA: NORMAL
SODIUM SERPL-SCNC: 142 MMOL/L (ref 134–144)
TRIGL SERPL-MCNC: 43 MG/DL (ref 0–149)
TSH SERPL DL<=0.005 MIU/L-ACNC: 2.39 UIU/ML (ref 0.45–4.5)
VLDLC SERPL CALC-MCNC: 8 MG/DL (ref 5–40)
WBC # BLD AUTO: 4.2 X10E3/UL (ref 3.4–10.8)

## 2022-08-23 NOTE — PROGRESS NOTES
Patient's labs are good and stable. No changes to medical regimen. Please encourage patient (if appropriate) to sign up for Mychart and text them the link if needed.     Dr. Janelle Espinosa

## 2022-08-25 NOTE — PROGRESS NOTES
A letter was sent to the patient at the address on file, with lab results and Dr. Janet Trevino recommendations for the patient.

## 2022-12-27 ENCOUNTER — OFFICE VISIT (OUTPATIENT)
Dept: FAMILY MEDICINE CLINIC | Age: 62
End: 2022-12-27
Payer: COMMERCIAL

## 2022-12-27 VITALS
TEMPERATURE: 98.3 F | HEART RATE: 86 BPM | SYSTOLIC BLOOD PRESSURE: 121 MMHG | OXYGEN SATURATION: 98 % | WEIGHT: 148.2 LBS | HEIGHT: 66 IN | DIASTOLIC BLOOD PRESSURE: 80 MMHG | BODY MASS INDEX: 23.82 KG/M2

## 2022-12-27 DIAGNOSIS — E11.42 TYPE 2 DIABETES MELLITUS WITH DIABETIC POLYNEUROPATHY, WITHOUT LONG-TERM CURRENT USE OF INSULIN (HCC): Primary | ICD-10-CM

## 2022-12-27 DIAGNOSIS — I10 ESSENTIAL HYPERTENSION: ICD-10-CM

## 2022-12-27 PROCEDURE — 3078F DIAST BP <80 MM HG: CPT | Performed by: STUDENT IN AN ORGANIZED HEALTH CARE EDUCATION/TRAINING PROGRAM

## 2022-12-27 PROCEDURE — 3074F SYST BP LT 130 MM HG: CPT | Performed by: STUDENT IN AN ORGANIZED HEALTH CARE EDUCATION/TRAINING PROGRAM

## 2022-12-27 PROCEDURE — 99214 OFFICE O/P EST MOD 30 MIN: CPT | Performed by: STUDENT IN AN ORGANIZED HEALTH CARE EDUCATION/TRAINING PROGRAM

## 2022-12-27 PROCEDURE — 3044F HG A1C LEVEL LT 7.0%: CPT | Performed by: STUDENT IN AN ORGANIZED HEALTH CARE EDUCATION/TRAINING PROGRAM

## 2022-12-27 RX ORDER — AMITRIPTYLINE HYDROCHLORIDE 25 MG/1
25 TABLET, FILM COATED ORAL EVERY EVENING
Qty: 30 TABLET | Refills: 1 | Status: SHIPPED | OUTPATIENT
Start: 2022-12-27

## 2022-12-27 NOTE — PATIENT INSTRUCTIONS
Metformin is a medication to help bring down sugars. You have been on gabapentin/Neurontin and pregabalin/Lyrica which are for nerve pain.

## 2022-12-27 NOTE — PROGRESS NOTES
Progress Note    he is a 60y.o. year old male who presents for evalution. Assessment/ Plan:   Diagnoses and all orders for this visit:    1. Type 2 diabetes mellitus with diabetic polyneuropathy, without long-term current use of insulin ([facility])  Assessment & Plan:  Lab Results   Component Value Date/Time    Hemoglobin A1c 6.0 (H) 08/22/2022 12:00 AM   Diabetes well controlled based on previous A1c. Reassess today with labs  Neuropathy uncontrolled. Previously tried gabapentin 600 mg 3 times daily and pregabalin 100 mg twice daily without relief  Start amitriptyline for sleep and neuropathy    Orders:  -     amitriptyline (ELAVIL) 25 mg tablet; Take 1 Tablet by mouth every evening.  -     METABOLIC PANEL, COMPREHENSIVE; Future  -     HEMOGLOBIN A1C WITH EAG; Future  -     LIPID PANEL; Future  -      DIABETES FOOT EXAM    2. Essential hypertension  Assessment & Plan:   well controlled, continue current medications-amlodipine 10 mg daily         Follow-up and Dispositions    Return in about 1 month (around 1/27/2023) for follow-up neuropathy. I have discussed the diagnosis with the patient and the intended plan as seen in the above orders. The patient has received an after-visit summary and questions were answered concerning future plans. Pt conveyed understanding of plan. Medication Side Effects and Warnings were discussed with patient        Subjective:     Chief Complaint   Patient presents with    Foot Problem     States sx's 2-3 years numbness and tingling has been on medication intermittently. States that he was on Metformin but stopped because the med would stop working. States that he took CBD oil for a while which helps. Takes about 2,000mg   Also had the numbness and tingling in his hands.       With CBD oil orally for the last 6+ months, stops the pain but doesn't stop the numbness or paresthesias   Doesn't typically bother him at home, but at night he does have more issues  Having issues with feet feeling cold, hands are numb as well  Worse at night. Reports previously doing treatment with needles in the feet. It was helping some, but insurance stopped covering it. Took metformin for 3 months, stopped because it didn't help with his feet. Reviewed PmHx, RxHx, FmHx, SocHx, AllgHx and updated and dated in the chart. Review of Systems - negative except as listed above in the HPI    Objective:     Vitals:    12/27/22 1019   BP: 121/80   Pulse: 86   Temp: 98.3 °F (36.8 °C)   SpO2: 98%   Weight: 148 lb 3.2 oz (67.2 kg)   Height: 5' 6\" (1.676 m)       Current Outpatient Medications   Medication Sig    OTHER 1,000 mg. CBD oil    amitriptyline (ELAVIL) 25 mg tablet Take 1 Tablet by mouth every evening. amLODIPine (NORVASC) 10 mg tablet TAKE 1 TABLET BY MOUTH EVERY DAY    multivit-min-folic-vit K-lycop (ONE-A-DAY MEN'S MULTIVITAMIN) 400- mcg tab Take  by mouth. No current facility-administered medications for this visit. Physical Exam  Vitals and nursing note reviewed. Constitutional:       General: He is not in acute distress. Appearance: Normal appearance. He is not ill-appearing, toxic-appearing or diaphoretic. HENT:      Head: Normocephalic and atraumatic. Eyes:      General: No scleral icterus. Right eye: No discharge. Left eye: No discharge. Conjunctiva/sclera: Conjunctivae normal.   Cardiovascular:      Pulses:           Dorsalis pedis pulses are 2+ on the right side and 2+ on the left side. Posterior tibial pulses are 2+ on the right side and 2+ on the left side. Pulmonary:      Effort: Pulmonary effort is normal. No respiratory distress. Musculoskeletal:      Cervical back: No rigidity. Right foot: Normal range of motion. No deformity. Left foot: Normal range of motion. No deformity. Feet:      Right foot:      Protective Sensation: 10 sites tested. 10 sites sensed.       Skin integrity: Skin integrity normal.      Toenail Condition: Right toenails are normal.      Left foot:      Protective Sensation: 10 sites tested. 10 sites sensed. Skin integrity: Skin integrity normal.      Toenail Condition: Left toenails are normal.      Comments: Some areas of monofilament testing were diminished or hypersensitive  Neurological:      General: No focal deficit present. Mental Status: He is alert.             Lizzie Crockett MD

## 2022-12-27 NOTE — PROGRESS NOTES
Kristopher Paul is a 60 y.o. male , id x 2(name and ). Reviewed record, history, and  medications. Chief Complaint   Patient presents with    Foot Problem     States sx's 2-3 years numbness and tingling has been on medication intermittently. States that he was on Metformin but stopped because the med would stop working. States that he took CBD oil for a while which helps. Takes about 2,000mg   Also had the numbness and tingling in his hands. Vitals:    22 1019   BP: 121/80   Pulse: 86   Temp: 98.3 °F (36.8 °C)   SpO2: 98%   Weight: 148 lb 3.2 oz (67.2 kg)   Height: 5' 6\" (1.676 m)       Coordination of Care Questionnaire:   1. Have you been to the ER, urgent care clinic since your last visit? Hospitalized since your last visit? No    2. Have you seen or consulted any other health care providers outside of the [address] [place] [address] since your last visit? Include any pap smears or colon screening. No      3 most recent PHQ Screens 2022   Little interest or pleasure in doing things Not at all   Feeling down, depressed, irritable, or hopeless Not at all   Total Score PHQ 2 0       Social Determinants of Health     Tobacco Use: Low Risk     Smoking Tobacco Use: Never    Smokeless Tobacco Use: Never    Passive Exposure: Not on file   Alcohol Use: Not on file   Financial Resource Strain: Not on file   Food Insecurity: Not on file   Transportation Needs: Not on file   Physical Activity: Not on file   Stress: Not on file   Social Connections: Not on file   Intimate Partner Violence: Not on file   Depression: Not at risk    PHQ-2 Score: 0   Housing Stability: Not on file       Patient is accompanied by self I have received verbal consent from Kristopher Paul to discuss any/all medical information while they are present in the room.

## 2022-12-28 LAB
ALBUMIN SERPL-MCNC: 4.7 G/DL (ref 3.8–4.8)
ALBUMIN/GLOB SERPL: 2.2 {RATIO} (ref 1.2–2.2)
ALP SERPL-CCNC: 118 IU/L (ref 44–121)
ALT SERPL-CCNC: 10 IU/L (ref 0–44)
AST SERPL-CCNC: 16 IU/L (ref 0–40)
BILIRUB SERPL-MCNC: 0.5 MG/DL (ref 0–1.2)
BUN SERPL-MCNC: 9 MG/DL (ref 8–27)
BUN/CREAT SERPL: 8 (ref 10–24)
CALCIUM SERPL-MCNC: 9.5 MG/DL (ref 8.6–10.2)
CHLORIDE SERPL-SCNC: 105 MMOL/L (ref 96–106)
CHOLEST SERPL-MCNC: 178 MG/DL (ref 100–199)
CO2 SERPL-SCNC: 26 MMOL/L (ref 20–29)
CREAT SERPL-MCNC: 1.18 MG/DL (ref 0.76–1.27)
EGFR: 69 ML/MIN/1.73
EST. AVERAGE GLUCOSE BLD GHB EST-MCNC: 123 MG/DL
GLOBULIN SER CALC-MCNC: 2.1 G/DL (ref 1.5–4.5)
GLUCOSE SERPL-MCNC: 91 MG/DL (ref 70–99)
HBA1C MFR BLD: 5.9 % (ref 4.8–5.6)
HDLC SERPL-MCNC: 70 MG/DL
IMP & REVIEW OF LAB RESULTS: NORMAL
LDLC SERPL CALC-MCNC: 94 MG/DL (ref 0–99)
POTASSIUM SERPL-SCNC: 4.3 MMOL/L (ref 3.5–5.2)
PROT SERPL-MCNC: 6.8 G/DL (ref 6–8.5)
SODIUM SERPL-SCNC: 145 MMOL/L (ref 134–144)
TRIGL SERPL-MCNC: 76 MG/DL (ref 0–149)
VLDLC SERPL CALC-MCNC: 14 MG/DL (ref 5–40)

## 2022-12-30 NOTE — PROGRESS NOTES
Pt  id x 3, notified as per  and verbalized understanding. Pt agrees to cholesterol meds but refused metformin.

## 2022-12-30 NOTE — PROGRESS NOTES
Please notify patient of lab results:      Normal CMP normal metabolic panel-electrolytes, liver markers, kidney. A1c stable in the prediabetic range. Continue focus on healthy lifestyle. Consider adding metformin back to decrease long-term risk of heart attack and stroke. Cholesterol above goal with diabetes. Recommend Lipitor 40 mg daily. Recheck 2 to 3 months after starting medication.

## 2023-01-09 ENCOUNTER — APPOINTMENT (OUTPATIENT)
Dept: GENERAL RADIOLOGY | Age: 63
End: 2023-01-09
Attending: STUDENT IN AN ORGANIZED HEALTH CARE EDUCATION/TRAINING PROGRAM
Payer: COMMERCIAL

## 2023-01-09 ENCOUNTER — HOSPITAL ENCOUNTER (EMERGENCY)
Age: 63
Discharge: HOME | End: 2023-01-09
Attending: STUDENT IN AN ORGANIZED HEALTH CARE EDUCATION/TRAINING PROGRAM
Payer: COMMERCIAL

## 2023-01-09 VITALS
HEIGHT: 66 IN | BODY MASS INDEX: 22.5 KG/M2 | TEMPERATURE: 98.3 F | WEIGHT: 140 LBS | OXYGEN SATURATION: 99 % | SYSTOLIC BLOOD PRESSURE: 144 MMHG | HEART RATE: 98 BPM | DIASTOLIC BLOOD PRESSURE: 93 MMHG | RESPIRATION RATE: 16 BRPM

## 2023-01-09 DIAGNOSIS — M54.16 LUMBAR RADICULOPATHY: Primary | ICD-10-CM

## 2023-01-09 PROCEDURE — 74011636637 HC RX REV CODE- 636/637: Performed by: STUDENT IN AN ORGANIZED HEALTH CARE EDUCATION/TRAINING PROGRAM

## 2023-01-09 PROCEDURE — 72110 X-RAY EXAM L-2 SPINE 4/>VWS: CPT

## 2023-01-09 PROCEDURE — 99283 EMERGENCY DEPT VISIT LOW MDM: CPT

## 2023-01-09 PROCEDURE — 74011250637 HC RX REV CODE- 250/637: Performed by: STUDENT IN AN ORGANIZED HEALTH CARE EDUCATION/TRAINING PROGRAM

## 2023-01-09 RX ORDER — METHOCARBAMOL 750 MG/1
750 TABLET, FILM COATED ORAL 4 TIMES DAILY
Qty: 20 TABLET | Refills: 0 | Status: SHIPPED | OUTPATIENT
Start: 2023-01-09 | End: 2023-01-14

## 2023-01-09 RX ORDER — PREDNISONE 20 MG/1
40 TABLET ORAL ONCE
Status: COMPLETED | OUTPATIENT
Start: 2023-01-09 | End: 2023-01-09

## 2023-01-09 RX ORDER — PREDNISONE 5 MG/1
TABLET ORAL
Qty: 21 TABLET | Refills: 0 | Status: SHIPPED | OUTPATIENT
Start: 2023-01-09

## 2023-01-09 RX ORDER — METHOCARBAMOL 500 MG/1
750 TABLET, FILM COATED ORAL ONCE
Status: COMPLETED | OUTPATIENT
Start: 2023-01-09 | End: 2023-01-09

## 2023-01-09 RX ORDER — ACETAMINOPHEN 500 MG
1000 TABLET ORAL ONCE
Status: COMPLETED | OUTPATIENT
Start: 2023-01-09 | End: 2023-01-09

## 2023-01-09 RX ADMIN — ACETAMINOPHEN 1000 MG: 500 TABLET ORAL at 10:07

## 2023-01-09 RX ADMIN — METHOCARBAMOL TABLETS 750 MG: 500 TABLET, COATED ORAL at 10:07

## 2023-01-09 RX ADMIN — PREDNISONE 40 MG: 20 TABLET ORAL at 10:07

## 2023-01-09 NOTE — Clinical Note
[address]  [facility]PT  [address] [id number] [phone]    Work/School Note    Date: 1/9/2023    To Whom It May concern:    Ced Sotelo was seen and treated today in the emergency room by the following provider(s):  Attending Provider: Kirill Benson DO. Ced Sotelo is excused from work/school on 1/9/2023 through 1/11/2023. He is medically clear to return to work/school on 1/12/2023.          Sincerely,          Brandon Mckeon DO

## 2023-01-09 NOTE — ED PROVIDER NOTES
Patient is a 65-year-old male history of prostate cancer and hypertension presenting today with low back pain. Patient reports that he had fairly sudden left low back pain yesterday while sitting on the couch. Denies injury or trauma. It is a sharp pain radiating down the left leg. Limited range of motion of the leg due to pain but he denies significant weakness or numbness. No bowel or bladder incontinence. No history of back surgeries. No fevers. No urinary symptoms. He tried taking a muscle relaxer at home without improvement. Past Medical History:   Diagnosis Date    Hypertension     Prostate cancer ([address]     Sleep apnea     STOPPED USING CPAP       Past Surgical History:   Procedure Laterality Date    HX GI      COLONOSCOPY    HX PROSTATECTOMY      PROSTATE BIOPSY         Family History:   Problem Relation Age of Onset    Diabetes Mother     Hypertension Mother     Anesth Problems Neg Hx        Social History     Socioeconomic History    Marital status: SINGLE     Spouse name: Not on file    Number of children: Not on file    Years of education: Not on file    Highest education level: Not on file   Occupational History    Not on file   Tobacco Use    Smoking status: Never    Smokeless tobacco: Never   Substance and Sexual Activity    Alcohol use: Yes     Comment: socially    Drug use: No    Sexual activity: Yes     Partners: Female   Other Topics Concern    Not on file   Social History Narrative    Not on file     Social Determinants of Health     Financial Resource Strain: Not on file   Food Insecurity: Not on file   Transportation Needs: Not on file   Physical Activity: Not on file   Stress: Not on file   Social Connections: Not on file   Intimate Partner Violence: Not on file   Housing Stability: Not on file         ALLERGIES: Patient has no known allergies. Review of Systems   Constitutional:  Negative for chills and fever. HENT:  Negative for congestion and sore throat.     Eyes: Negative for pain and redness. Respiratory:  Negative for cough and shortness of breath. Cardiovascular:  Negative for chest pain and palpitations. Gastrointestinal:  Negative for abdominal pain, diarrhea, nausea and vomiting. Genitourinary:  Negative for frequency and hematuria. Musculoskeletal:  Positive for arthralgias and back pain. Negative for neck pain. Skin:  Negative for rash and wound. Neurological:  Negative for dizziness and headaches. Hematological:  Does not bruise/bleed easily. Vitals:    01/09/23 0859 01/09/23 0916 01/09/23 0931 01/09/23 0946   BP: (!) 155/89 (!) 147/94 (!) 140/96 (!) 144/93   Pulse: 98      Resp: 16      Temp: 98.3 °F (36.8 °C)      SpO2: 97% 99% 98% 99%   Weight: 63.5 kg (140 lb)      Height: 5' 6\" (1.676 m)               Physical Exam  Vitals and nursing note reviewed. Constitutional:       General: He is not in acute distress. Appearance: He is well-developed. HENT:      Head: Normocephalic and atraumatic. Eyes:      Conjunctiva/sclera: Conjunctivae normal.      Pupils: Pupils are equal, round, and reactive to light. Cardiovascular:      Rate and Rhythm: Normal rate and regular rhythm. Heart sounds: Normal heart sounds. No murmur heard. No friction rub. No gallop. Comments: Equal radial, dp, and pt pulses  Pulmonary:      Effort: Pulmonary effort is normal. No respiratory distress. Breath sounds: Normal breath sounds. No wheezing or rales. Abdominal:      General: Bowel sounds are normal. There is no distension. Palpations: Abdomen is soft. Tenderness: There is no abdominal tenderness. There is no guarding or rebound. Musculoskeletal:         General: Normal range of motion. Cervical back: Normal range of motion and neck supple. Comments: Left paraspinal lumbar tenderness without midline tenderness/step-off/deformity. Positive straight leg raise on the left. Equal DP and PT pulses.   Motor and sensory intact in bilateral lower extremities. Skin:     General: Skin is warm and dry. Capillary Refill: Capillary refill takes less than 2 seconds. Findings: No rash. Neurological:      Mental Status: He is alert and oriented to person, place, and time. Medical Decision Making  Amount and/or Complexity of Data Reviewed  Radiology: ordered. Risk  OTC drugs. Prescription drug management. Procedures        MDM:  The patient is a 59-year-old male presenting today with left low back pain radiating down the leg. History and physical most consistent with lumbar radiculopathy. No red flags to suggest cauda equina syndrome, epidural abscess or spinal cord compression. Patient given prednisone and Robaxin/Tylenol here in the ED. X-ray unremarkable. Plan for discharge home with steroids and Robaxin. Return precautions provided he was discharged home.       ICD-10-CM ICD-9-CM    1. Lumbar radiculopathy  M54.16 724.4           Disposition: Discharge    Brandon Mckeon,

## 2023-01-09 NOTE — DISCHARGE INSTRUCTIONS
Do not take other muscle relaxers in addition to the ones I am giving you    RETURN IF WORSENING PAIN, TROUBLE HOLDING STOOL/URINE, RADIATION OF PAIN, OR WEAKNESS/NUMBNESS

## 2023-01-09 NOTE — Clinical Note
[address]  [facility]  [address] [id number]  [phone]    Work/School Note    Date: 1/9/2023    To Whom It May concern:    Tod Oates was seen and treated today in the emergency room by the following provider(s):  Attending Provider: Manuel Thomas DO. Tod Oates is excused from work/school on 1/9/2023 through 1/11/2023. He is medically clear to return to work/school on 1/12/2023.          Sincerely,          Brandon Mckeon DO

## 2023-01-09 NOTE — ED TRIAGE NOTES
Pt ambulatory into ER accompanied by friend with cc of \"stiff\" lower back since yesterday. Pt denies direct injury. Pt reports taking muscle relaxers without relief. Pt ambulatory with slow, steady gait.

## 2023-01-18 DIAGNOSIS — E11.42 TYPE 2 DIABETES MELLITUS WITH DIABETIC POLYNEUROPATHY, WITHOUT LONG-TERM CURRENT USE OF INSULIN (HCC): ICD-10-CM

## 2023-01-20 RX ORDER — AMITRIPTYLINE HYDROCHLORIDE 25 MG/1
25 TABLET, FILM COATED ORAL EVERY EVENING
Qty: 90 TABLET | Refills: 1 | Status: SHIPPED | OUTPATIENT
Start: 2023-01-20

## 2023-03-08 ENCOUNTER — OFFICE VISIT (OUTPATIENT)
Dept: FAMILY MEDICINE CLINIC | Age: 63
End: 2023-03-08
Payer: COMMERCIAL

## 2023-03-08 VITALS
BODY MASS INDEX: 24.43 KG/M2 | HEIGHT: 66 IN | TEMPERATURE: 97.7 F | DIASTOLIC BLOOD PRESSURE: 70 MMHG | RESPIRATION RATE: 18 BRPM | OXYGEN SATURATION: 98 % | SYSTOLIC BLOOD PRESSURE: 152 MMHG | HEART RATE: 87 BPM | WEIGHT: 152 LBS

## 2023-03-08 DIAGNOSIS — E78.00 HYPERCHOLESTEREMIA: ICD-10-CM

## 2023-03-08 DIAGNOSIS — G62.9 PERIPHERAL POLYNEUROPATHY: ICD-10-CM

## 2023-03-08 DIAGNOSIS — I10 ESSENTIAL HYPERTENSION: ICD-10-CM

## 2023-03-08 DIAGNOSIS — R73.03 PREDIABETES: Primary | ICD-10-CM

## 2023-03-08 PROCEDURE — 99214 OFFICE O/P EST MOD 30 MIN: CPT | Performed by: STUDENT IN AN ORGANIZED HEALTH CARE EDUCATION/TRAINING PROGRAM

## 2023-03-08 PROCEDURE — 3078F DIAST BP <80 MM HG: CPT | Performed by: STUDENT IN AN ORGANIZED HEALTH CARE EDUCATION/TRAINING PROGRAM

## 2023-03-08 PROCEDURE — 3074F SYST BP LT 130 MM HG: CPT | Performed by: STUDENT IN AN ORGANIZED HEALTH CARE EDUCATION/TRAINING PROGRAM

## 2023-03-08 RX ORDER — AMITRIPTYLINE HYDROCHLORIDE 100 MG/1
TABLET, FILM COATED ORAL
Qty: 30 TABLET | Refills: 3 | Status: SHIPPED | OUTPATIENT
Start: 2023-03-08

## 2023-03-08 RX ORDER — LOSARTAN POTASSIUM 50 MG/1
50 TABLET ORAL DAILY
Qty: 30 TABLET | Refills: 3 | Status: SHIPPED | OUTPATIENT
Start: 2023-03-08

## 2023-03-08 NOTE — PROGRESS NOTES
Progress Note    he is a 60y.o. year old male who presents for evalution. Assessment/ Plan:   {There are no diagnoses linked to this encounter. (Refresh or delete this SmartLink)}     Patient is *** fasting for labs today. I have discussed the diagnosis with the patient and the intended plan as seen in the above orders. The patient has received an after-visit summary and questions were answered concerning future plans. Pt conveyed understanding of plan. Medication Side Effects and Warnings were discussed with patient        Subjective:     Chief Complaint   Patient presents with    Cholesterol Problem     Non-fasting     Neuropathy  Still with needle and pressure feeling in his toes, sometimes in his arches as well  Occasionally in the hands too  Sometimes having dry mouth since starting amitriptyline. Mother had issues with neuropathy. She had diabetes too. Atorvastatin  No myalgias      Reviewed PmHx, RxHx, FmHx, SocHx, AllgHx and updated and dated in the chart. Review of Systems - negative except as listed above in the HPI    Objective:     Vitals:    03/08/23 0940   BP: (!) 152/70   Pulse: 87   Resp: 18   Temp: 97.7 °F (36.5 °C)   SpO2: 98%   Weight: 152 lb (68.9 kg)   Height: 5' 6\" (1.676 m)       Current Outpatient Medications   Medication Sig    amitriptyline (ELAVIL) 25 mg tablet Take 1 Tablet by mouth every evening. atorvastatin (LIPITOR) 40 mg tablet Take 1 Tablet by mouth daily. OTHER 1,000 mg. CBD oil    amLODIPine (NORVASC) 10 mg tablet TAKE 1 TABLET BY MOUTH EVERY DAY    multivit-min-folic-vit K-lycop (ONE-A-DAY MEN'S MULTIVITAMIN) 400- mcg tab Take  by mouth. No current facility-administered medications for this visit.        Physical Exam         Ben Young MD discussed with patient        Subjective:     Chief Complaint   Patient presents with    Cholesterol Problem     Non-fasting     Neuropathy  Still with needle and pressure feeling in his toes, sometimes in his arches as well  Occasionally in the hands too  Sometimes having dry mouth since starting amitriptyline. Mother had issues with neuropathy. She had diabetes too. He is taking atorvastatin  No myalgias      Reviewed PmHx, RxHx, FmHx, SocHx, AllgHx and updated and dated in the chart. Review of Systems - negative except as listed above in the HPI    Objective:     Vitals:    03/08/23 0940   BP: (!) 152/70   Pulse: 87   Resp: 18   Temp: 97.7 °F (36.5 °C)   SpO2: 98%   Weight: 152 lb (68.9 kg)   Height: 5' 6\" (1.676 m)       Current Outpatient Medications   Medication Sig    amitriptyline (ELAVIL) 100 mg tablet Start taking 1/2 tablet by mouth nightly, then after 1-2 weeks increase to 1 tablet nightly. losartan (COZAAR) 50 mg tablet Take 1 Tablet by mouth daily. atorvastatin (LIPITOR) 40 mg tablet Take 1 Tablet by mouth daily. OTHER 1,000 mg. CBD oil    amLODIPine (NORVASC) 10 mg tablet TAKE 1 TABLET BY MOUTH EVERY DAY    multivit-min-folic-vit K-lycop (ONE-A-DAY MEN'S MULTIVITAMIN) 400- mcg tab Take  by mouth. No current facility-administered medications for this visit. Physical Exam  Vitals and nursing note reviewed. Constitutional:       General: He is not in acute distress. Appearance: Normal appearance. He is not ill-appearing, toxic-appearing or diaphoretic. HENT:      Head: Normocephalic and atraumatic. Eyes:      General: No scleral icterus. Right eye: No discharge. Left eye: No discharge. Conjunctiva/sclera: Conjunctivae normal.   Cardiovascular:      Rate and Rhythm: Normal rate and regular rhythm. Pulses: Normal pulses. Heart sounds: Normal heart sounds. Pulmonary:      Effort: Pulmonary effort is normal. No respiratory distress. Breath sounds: Normal breath sounds. Musculoskeletal:         General: No swelling or deformity. Cervical back: No rigidity. Right lower leg: No edema. Left lower leg: No edema. Skin:     General: Skin is warm and dry. Neurological:      General: No focal deficit present. Mental Status: He is alert. Psychiatric:         Behavior: Behavior is agitated. Behavior is cooperative.             Sergey Valdez MD

## 2023-03-08 NOTE — PROGRESS NOTES
Patient here for cholesterol labs. Did not fast.    1. Have you been to the ER, urgent care clinic since your last visit? Hospitalized since your last visit? No    2. Have you seen or consulted any other health care providers outside of the [address] [place] [address] since your last visit? Include any pap smears or colon screening.  No

## 2023-03-09 LAB
ALBUMIN SERPL-MCNC: 4.4 G/DL (ref 3.8–4.8)
ALBUMIN/GLOB SERPL: 1.8 {RATIO} (ref 1.2–2.2)
ALP SERPL-CCNC: 137 IU/L (ref 44–121)
ALT SERPL-CCNC: 15 IU/L (ref 0–44)
AST SERPL-CCNC: 20 IU/L (ref 0–40)
BILIRUB SERPL-MCNC: 0.5 MG/DL (ref 0–1.2)
BUN SERPL-MCNC: 11 MG/DL (ref 8–27)
BUN/CREAT SERPL: 9 (ref 10–24)
CALCIUM SERPL-MCNC: 9.4 MG/DL (ref 8.6–10.2)
CHLORIDE SERPL-SCNC: 103 MMOL/L (ref 96–106)
CHOLEST SERPL-MCNC: 129 MG/DL (ref 100–199)
CO2 SERPL-SCNC: 26 MMOL/L (ref 20–29)
CREAT SERPL-MCNC: 1.16 MG/DL (ref 0.76–1.27)
EGFRCR SERPLBLD CKD-EPI 2021: 71 ML/MIN/1.73
FOLATE SERPL-MCNC: >20 NG/ML
GLOBULIN SER CALC-MCNC: 2.4 G/DL (ref 1.5–4.5)
GLUCOSE SERPL-MCNC: 108 MG/DL (ref 70–99)
HDLC SERPL-MCNC: 67 MG/DL
IMP & REVIEW OF LAB RESULTS: NORMAL
LDLC SERPL CALC-MCNC: 52 MG/DL (ref 0–99)
POTASSIUM SERPL-SCNC: 4.2 MMOL/L (ref 3.5–5.2)
PROT SERPL-MCNC: 6.8 G/DL (ref 6–8.5)
SODIUM SERPL-SCNC: 142 MMOL/L (ref 134–144)
SPECIMEN STATUS REPORT, ROLRST: NORMAL
TRIGL SERPL-MCNC: 39 MG/DL (ref 0–149)
TSH SERPL DL<=0.005 MIU/L-ACNC: 1.64 UIU/ML (ref 0.45–4.5)
VIT B12 SERPL-MCNC: 1062 PG/ML (ref 232–1245)
VLDLC SERPL CALC-MCNC: 10 MG/DL (ref 5–40)

## 2023-03-15 NOTE — PROGRESS NOTES
Normal CMP, lipids (continue Lipitor), TSH, B12 and folate.   Patient notified of results via letter

## 2023-04-24 ENCOUNTER — VIRTUAL VISIT (OUTPATIENT)
Dept: FAMILY MEDICINE CLINIC | Age: 63
End: 2023-04-24
Payer: COMMERCIAL

## 2023-04-24 DIAGNOSIS — G62.9 PERIPHERAL POLYNEUROPATHY: ICD-10-CM

## 2023-04-24 DIAGNOSIS — I10 ESSENTIAL HYPERTENSION: ICD-10-CM

## 2023-04-24 DIAGNOSIS — E78.00 HYPERCHOLESTEREMIA: ICD-10-CM

## 2023-04-24 PROCEDURE — 99214 OFFICE O/P EST MOD 30 MIN: CPT | Performed by: STUDENT IN AN ORGANIZED HEALTH CARE EDUCATION/TRAINING PROGRAM

## 2023-04-24 NOTE — ASSESSMENT & PLAN NOTE
unclear control, continue current medications pending work up below, medication adherence emphasized   Monitor bp at home, increase losartan to 100 mg daily for persistent elevations.

## 2023-04-24 NOTE — PROGRESS NOTES
Progress Note    he is a 58y.o. year old male who presents for evalution. Assessment/ Plan:   Diagnoses and all orders for this visit:    1. Peripheral polyneuropathy  Assessment & Plan:  Pain uncontrolled, we have tried gabapentin, Lyrica, amitriptyline without relief despite dose increases. Patient has been inconsistent with medication use and follow-up, which makes medication adjustment difficult. Neuropathy possibly due to prediabetes, labs did not identify any other cause. Scheduled with neurology for further evaluation and treatment, patient reports appointment is end of May      2. Hypercholesteremia  Assessment & Plan:  Reviewed reason for atorvastatin use and recent labs. Encouraged to continue working on healthy diet      3. Essential hypertension  Assessment & Plan:   unclear control, continue current medications pending work up below, medication adherence emphasized   Monitor bp at home, increase losartan to 100 mg daily for persistent elevations. Follow-up and Dispositions    Return for as previously scheduled. I have discussed the diagnosis with the patient and the intended plan as seen in the above orders. The patient has received an after-visit summary and questions were answered concerning future plans. Pt conveyed understanding of plan. Medication Side Effects and Warnings were discussed with patient       Subjective:     Chief Complaint   Patient presents with    Medication Evaluation     Pt was taking Elavil - but stopped about 3 wks ago- weak, drowsy & seeing things ( at night )      Started Elavil in January. Increased to 100 mg as instructed. It was helping with the pain  He stopped taking Elavil 100 mg  Felt paranoid, was falling down in addition to above listed symptoms. Seeing neurology as well, initial later in May    No home bp monitoring.   \"I keep forgetting to take it\"  He will check today at the drug store  Compliant with bp rx      Reviewed PmHx, RxHx, FmHx, SocHx, AllgHx and updated and dated in the chart. Review of Systems - negative except as listed above in the HPI      Objective: There were no vitals filed for this visit. Current Outpatient Medications   Medication Sig    losartan (COZAAR) 50 mg tablet Take 1 Tablet by mouth daily. atorvastatin (LIPITOR) 40 mg tablet Take 1 Tablet by mouth daily. OTHER 1,000 mg. CBD oil    amLODIPine (NORVASC) 10 mg tablet TAKE 1 TABLET BY MOUTH EVERY DAY    multivit-min-folic-vit K-lycop (ONE-A-DAY MEN'S MULTIVITAMIN) 400- mcg tab Take  by mouth. No current facility-administered medications for this visit. Physical Exam  Vitals and nursing note reviewed. Constitutional:       General: He is not in acute distress. Appearance: Normal appearance. He is not ill-appearing, toxic-appearing or diaphoretic. HENT:      Head: Normocephalic and atraumatic. Eyes:      General: No scleral icterus. Right eye: No discharge. Left eye: No discharge. Conjunctiva/sclera: Conjunctivae normal.   Pulmonary:      Effort: Pulmonary effort is normal. No respiratory distress. Musculoskeletal:      Cervical back: No rigidity. Neurological:      Mental Status: He is alert. Psychiatric:         Mood and Affect: Mood normal.         Behavior: Behavior normal.         Thought Content: Thought content normal.         Judgment: Judgment normal.             I was in the office while conducting this encounter. Total Time: minutes: 21-30 minutes. Sharyle Donna is a 58 y.o. male being evaluated by a Virtual Visit (video visit) encounter to address concerns as mentioned above. A caregiver was present when appropriate. Due to this being a TeleHealth encounter (During XSY-71 public health emergency), evaluation of the following organ systems was limited: Vitals/Constitutional/EENT/Resp/CV/GI//MS/Neuro/Skin/Heme-Lymph-Imm.   Pursuant to the emergency declaration under the [facility] waiver authority and the [employer] and [employer] Act, this Virtual Visit was conducted with patient's (and/or legal guardian's) consent, to reduce the risk of exposure to COVID-19 and provide necessary medical care. Services were provided through a video synchronous discussion virtually to substitute for in-person encounter. --Luz Marie MD on 4/24/2023 at 9:04 AM    An electronic signature was used to authenticate this note.

## 2023-04-24 NOTE — ASSESSMENT & PLAN NOTE
Pain uncontrolled, we have tried gabapentin, Lyrica, amitriptyline without relief despite dose increases. Patient has been inconsistent with medication use and follow-up, which makes medication adjustment difficult. Neuropathy possibly due to prediabetes, labs did not identify any other cause.   Scheduled with neurology for further evaluation and treatment, patient reports appointment is end of May

## 2023-04-24 NOTE — PATIENT INSTRUCTIONS
You should purchase a blood pressure cuff to check your blood pressure at home. Check first thing in the morning. You should be relaxed, seated with back supported, feet flat on the floor, arm with cuff resting at heart height. You should check your blood pressure 1-3 times. Please write down your blood pressures and bring this record and your blood pressure cuff/machine to your follow-up visit. I would like for your blood pressure to be less than 130 for the top number and less than 90 for the bottom number. Please call Dr. Arpit Farris if you get consistently high blood pressure readings at home. We would increase your losartan dose if needed.

## 2023-05-08 ENCOUNTER — TELEPHONE (OUTPATIENT)
Age: 63
End: 2023-05-08

## 2023-05-08 RX ORDER — AMLODIPINE BESYLATE 10 MG/1
TABLET ORAL
Qty: 90 TABLET | Refills: 1 | Status: SHIPPED | OUTPATIENT
Start: 2023-05-08

## 2023-05-08 NOTE — TELEPHONE ENCOUNTER
Called pt. Verified. Inform pt appt scheduled for 5/23/23 at 9:00AM has to be cancelled and rescheduled provider will be out of the office.  Pt verbalizes understanding and is scheduled for 5/18/23 at 10:20AM.

## 2023-05-18 ENCOUNTER — OFFICE VISIT (OUTPATIENT)
Age: 63
End: 2023-05-18
Payer: COMMERCIAL

## 2023-05-18 VITALS
WEIGHT: 150 LBS | DIASTOLIC BLOOD PRESSURE: 70 MMHG | SYSTOLIC BLOOD PRESSURE: 130 MMHG | TEMPERATURE: 96.9 F | BODY MASS INDEX: 24.11 KG/M2 | OXYGEN SATURATION: 98 % | HEIGHT: 66 IN | HEART RATE: 75 BPM

## 2023-05-18 DIAGNOSIS — R20.2 PARESTHESIA: Primary | ICD-10-CM

## 2023-05-18 DIAGNOSIS — R20.2 PARESTHESIA: ICD-10-CM

## 2023-05-18 PROCEDURE — 3078F DIAST BP <80 MM HG: CPT | Performed by: PSYCHIATRY & NEUROLOGY

## 2023-05-18 PROCEDURE — 99245 OFF/OP CONSLTJ NEW/EST HI 55: CPT | Performed by: PSYCHIATRY & NEUROLOGY

## 2023-05-18 PROCEDURE — 3075F SYST BP GE 130 - 139MM HG: CPT | Performed by: PSYCHIATRY & NEUROLOGY

## 2023-05-18 RX ORDER — LOSARTAN POTASSIUM 50 MG/1
50 TABLET ORAL DAILY
COMMUNITY
Start: 2023-03-08

## 2023-05-18 RX ORDER — ATORVASTATIN CALCIUM 40 MG/1
40 TABLET, FILM COATED ORAL DAILY
COMMUNITY
Start: 2023-04-01

## 2023-05-18 NOTE — PROGRESS NOTES
Non labored breathing  Regular rate and rhythm, no carotid bruits   Abdomen:   Soft, non-distended   Extremities: Extremities normal, atraumatic, no cyanosis or edema. Pulses: 2+ and symmetric all extremities. Skin: Skin color, texture, turgor normal. No rashes or lesions. Neurologic:  Gen: Attention normal             Language: naming, repetition, fluency normal             Memory: intact recent and remote memory  Cranial Nerves:  I: smell Not tested   II: visual fields Full to confrontation   II: pupils Equal, round, reactive to light   II: optic disc No papilledema   III,VII: ptosis none   III,IV,VI: extraocular muscles  Full ROM   V: mastication normal   V: facial light touch sensation  normal   VII: facial muscle function   symmetric   VIII: hearing symmetric   IX: soft palate elevation  normal   XI: trapezius strength  5/5   XI: sternocleidomastoid strength 5/5   XI: neck flexion strength  5/5   XII: tongue  midline     Motor: normal bulk and tone, no tremor              Strength: 5/5 all four extremities  Sensory: intact to LT, PP, vibration, and temperature  Reflexes: 2+ throughout  Coordination: Good FTN and HTS, Rhomberg negative  Gait: normal gait including tandem          IMPRESSION  Enriqueta Rangel is a 58 y.o. male who  has a past medical history of Hypertension, Prostate cancer ([address] and Sleep apnea. who for the past 5 yrs, noted pain and numbness described as needle stick in both hands and feet. (-) weakness. Saw an unrecalled neurologist. Did an EMG (?) with unrecalled results. Was taking Amitriptyline 100 mg but caused him to see things so was stopped. (+) pre-diabetes for 5 yrs    Considerations include small fiber polyneuropathy due to borderline diabetes. Evaluate for other inflammatory cause. RECOMMENDATIONS  1. I had a long discussion with patient. Discussed diagnosis, pathophysiology prognosis, available treatment and formulating plan. Reviewed test results.  All questions were

## 2023-05-19 DIAGNOSIS — R20.2 PARESTHESIA: ICD-10-CM

## 2023-05-19 LAB
ANA SER QL: NEGATIVE
ERYTHROCYTE [SEDIMENTATION RATE] IN BLOOD BY WESTERGREN METHOD: 2 MM/HR (ref 0–30)

## 2023-05-22 LAB
ALBUMIN SERPL ELPH-MCNC: 4.2 G/DL (ref 2.9–4.4)
ALBUMIN/GLOB SERPL: 1.5 {RATIO} (ref 0.7–1.7)
ALPHA1 GLOB SERPL ELPH-MCNC: 0.2 G/DL (ref 0–0.4)
ALPHA2 GLOB SERPL ELPH-MCNC: 0.6 G/DL (ref 0.4–1)
B-GLOBULIN SERPL ELPH-MCNC: 1 G/DL (ref 0.7–1.3)
GAMMA GLOB SERPL ELPH-MCNC: 1.1 G/DL (ref 0.4–1.8)
GLOBULIN SER-MCNC: 2.9 G/DL (ref 2.2–3.9)
IGA SERPL-MCNC: 240 MG/DL (ref 61–437)
IGG SERPL-MCNC: 1133 MG/DL (ref 603–1613)
IGM SERPL-MCNC: 55 MG/DL (ref 20–172)
INTERPRETATION SERPL IEP-IMP: ABNORMAL
KAPPA LC FREE SER-MCNC: 14.8 MG/L (ref 3.3–19.4)
KAPPA LC FREE/LAMBDA FREE SER: 1.85 {RATIO} (ref 0.26–1.65)
LABORATORY COMMENT REPORT: ABNORMAL
LAMBDA LC FREE SERPL-MCNC: 8 MG/L (ref 5.7–26.3)
M PROTEIN SERPL ELPH-MCNC: ABNORMAL G/DL
PROT SERPL-MCNC: 7.1 G/DL (ref 6–8.5)

## 2023-05-23 RX ORDER — ATORVASTATIN CALCIUM 40 MG/1
40 TABLET, FILM COATED ORAL DAILY
COMMUNITY
Start: 2023-01-04 | End: 2023-07-01

## 2023-05-23 RX ORDER — LOSARTAN POTASSIUM 50 MG/1
50 TABLET ORAL DAILY
COMMUNITY
Start: 2023-03-08 | End: 2023-06-05

## 2023-05-25 ENCOUNTER — PROCEDURE VISIT (OUTPATIENT)
Age: 63
End: 2023-05-25
Payer: COMMERCIAL

## 2023-05-25 DIAGNOSIS — R20.2 PARESTHESIA: Primary | ICD-10-CM

## 2023-05-25 PROCEDURE — 95886 MUSC TEST DONE W/N TEST COMP: CPT | Performed by: PSYCHIATRY & NEUROLOGY

## 2023-05-25 PROCEDURE — 95912 NRV CNDJ TEST 11-12 STUDIES: CPT | Performed by: PSYCHIATRY & NEUROLOGY

## 2023-05-25 PROCEDURE — 99213 OFFICE O/P EST LOW 20 MIN: CPT | Performed by: PSYCHIATRY & NEUROLOGY

## 2023-05-25 RX ORDER — DULOXETIN HYDROCHLORIDE 30 MG/1
30 CAPSULE, DELAYED RELEASE ORAL
Qty: 30 CAPSULE | Refills: 5 | Status: SHIPPED | OUTPATIENT
Start: 2023-05-25

## 2023-05-25 NOTE — PROGRESS NOTES
EMG/NCS done. See Procedure Note for results. Started alpha lipoic acid 600 mg BID with no benefit yet, but no side effects. 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 Patient deferred for now.       Follow up in 3 months      Leslie Flores MD
Nml Nml    Left 1stDorInt Ulnar C8-T1 Nml Nml Nml Nml Nml Nml Nml    Left ExtIndicis Radial (Post Int) C7-8 Nml Nml Nml Nml Nml Nml Nml    Left Abd Poll Brev Median C8-T1 Nml Nml Nml Nml Nml Nml Nml    Left Biceps Musculocut C5-6 Nml Nml Nml Nml Nml Nml Nml    Left Triceps Radial C6-7-8 Nml Nml Nml Nml Nml Nml Nml    Left Deltoid Axillary C5-6 Nml Nml Nml Nml Nml Nml Nml    Left Lower Cerv Parasp Rami C7,T1 Nml Nml Nml Nml Nml Nml Nml                Nerve Conduction Studies  Anti Sensory Left/Right Comparison     Stim Site L Lat (ms) R Lat (ms) L-R Lat (ms) L Amp (µV) R Amp (µV) L-R Amp (%) Site1 Site2 L Gavino (m/s) R Gavino (m/s) L-R Gavino (m/s)   Median Anti Sensory (2nd Digit)  30.6 °C   Wrist 3.1   26.2   Wrist 2nd Digit 45     Radial Anti Sensory (Base 1st Digit)  31.1 °C   Wrist 1.5   58.4   Wrist Base 1st Digit 67     Sup Fibular Anti Sensory (Lat ankle)  32.4 °C   Lower leg 1.8   25.2   Lower leg Lat ankle 56     Sural Anti Sensory (Lat Mall)  32 °C   Calf 2.7   6.1   Calf Lat Mall 52     Ulnar Anti Sensory (5th Digit)  31.2 °C   Wrist 2.8   40.1   Wrist 5th Digit 50       Motor Left/Right Comparison     Stim Site L Lat (ms) R Lat (ms) L-R Lat (ms) L Amp (mV) R Amp (mV) L-R Amp (%) Site1 Site2 L Gavino (m/s) R Gavino (m/s) L-R Gavino (m/s)   Fibular Motor (Ext Dig Brev)  32.4 °C   Ankle 3.4   4.1   Ankle Ext Dig Brev      B Fib 10.2   3.7   B Fib Ankle 47     Poplt 11.8   3.2   Poplt B Fib 62     Median Motor (Abd Poll Brev)  31.1 °C   Wrist 3.7   8.5   Wrist Abd Poll Brev      Elbow 7.2   8.3   Elbow Wrist 59     Tibial Motor (Abd Hernández Brev)  32.4 °C   Ankle 3.8   8.8   Ankle Abd Hernández Brev      Knee 12.0   7.4   Knee Ankle 44     Ulnar Motor (Abd Dig Minimi)  31.3 °C   Wrist 3.1   10.2   Wrist Abd Dig Minimi      B Elbow 6.3   9.0   B Elbow Wrist 64     A Elbow 8.0   8.6   A Elbow B Elbow 59       Comparison Left/Right Comparison     Stim Site L Lat (ms) R Lat (ms) L-R Lat (ms) L Amp (µV) R Amp (µV) L-R Amp (%)

## 2023-05-27 LAB
14-3-3 ETA AG SER IA-MCNC: <0.2 NG/ML
CCP IGA+IGG SERPL IA-ACNC: <20 UNITS
RHEUMATOID FACT SERPL-ACNC: <14 UNITS/ML

## 2023-06-03 DIAGNOSIS — I10 ESSENTIAL (PRIMARY) HYPERTENSION: ICD-10-CM

## 2023-06-05 RX ORDER — LOSARTAN POTASSIUM 50 MG/1
TABLET ORAL
Qty: 90 TABLET | Refills: 1 | Status: SHIPPED | OUTPATIENT
Start: 2023-06-05

## 2023-07-01 RX ORDER — ATORVASTATIN CALCIUM 40 MG/1
TABLET, FILM COATED ORAL
Qty: 90 TABLET | Refills: 1 | Status: SHIPPED | OUTPATIENT
Start: 2023-07-01

## 2023-08-25 NOTE — PROGRESS NOTES
Neurology Progress Note    Patient ID:  Micky Heredia  [id number]  31 y.o.  1960      Subjective:   History:  Micky Heredia is a 58 y.o. male who  has a past medical history of Hypertension, Prostate cancer ([address]),  prediabetes and Sleep apnea. who for the past 5 yrs, noted pain and numbness described as needle stick in both hands and feet. (-) weakness. Saw an unrecalled neurologist. Did an EMG (?) with unrecalled results. Was taking Amitriptyline 100 mg but caused him to see things so was stopped. Patient was started on Cymbalta 30 mg with no clear benefit. Still on alpha lipoic acid 600 mg BID. Recent tests done:  EMG/NCS of the L UE and L LE (5/25/23): normal      ROS:  Per HPI-  Otherwise the remainder of ROS was negative    Social Hx  Social History     Socioeconomic History    Marital status: Single   Tobacco Use    Smoking status: Never    Smokeless tobacco: Never   Substance and Sexual Activity    Alcohol use: Yes    Drug use: No       Meds:  Current Outpatient Medications on File Prior to Visit   Medication Sig Dispense Refill    Alpha-Lipoic Acid 600 MG CAPS Take by mouth      atorvastatin (LIPITOR) 40 MG tablet TAKE 1 TABLET BY MOUTH EVERY DAY 90 tablet 1    losartan (COZAAR) 50 MG tablet TAKE 1 TABLET BY MOUTH EVERY DAY 90 tablet 1    Multiple Vitamin (ONE-A-DAY MENS PO) Take by mouth      amLODIPine (NORVASC) 10 MG tablet TAKE 1 TABLET BY MOUTH EVERY DAY 90 tablet 1     No current facility-administered medications on file prior to visit. Imaging:    CT Results (recent):  @FastclickSTIMGCAT(EKW0459:1)@    MRI Results (recent):  @FastclickSTIMGCAT(WXN8395:1)@    IR Results (recent):  @Doctor EvidenceILASTIMGCAT(UJE0854:1)@    VAS/US Results (recent):  @BSflatevILASTIMGCAT(UBU1929:1)@    Reviewed records in [facility] and media tab today    Lab Review     No visits with results within 3 Month(s) from this visit.    Latest known visit with results is:   Orders Only on 05/18/2023   Component Date Value Detail Level: Detailed Size Of Lesion: 0.8 cm

## 2023-08-28 ENCOUNTER — OFFICE VISIT (OUTPATIENT)
Age: 63
End: 2023-08-28
Payer: COMMERCIAL

## 2023-08-28 VITALS
OXYGEN SATURATION: 100 % | HEART RATE: 83 BPM | TEMPERATURE: 97 F | HEIGHT: 66 IN | BODY MASS INDEX: 24.21 KG/M2 | SYSTOLIC BLOOD PRESSURE: 120 MMHG | DIASTOLIC BLOOD PRESSURE: 70 MMHG

## 2023-08-28 DIAGNOSIS — R20.2 PARESTHESIA: Primary | ICD-10-CM

## 2023-08-28 PROCEDURE — 99214 OFFICE O/P EST MOD 30 MIN: CPT | Performed by: PSYCHIATRY & NEUROLOGY

## 2023-08-28 PROCEDURE — 3074F SYST BP LT 130 MM HG: CPT | Performed by: PSYCHIATRY & NEUROLOGY

## 2023-08-28 PROCEDURE — 3078F DIAST BP <80 MM HG: CPT | Performed by: PSYCHIATRY & NEUROLOGY

## 2023-08-28 RX ORDER — PERPHENAZINE 16 MG
TABLET ORAL
COMMUNITY

## 2023-08-28 RX ORDER — DULOXETIN HYDROCHLORIDE 60 MG/1
60 CAPSULE, DELAYED RELEASE ORAL DAILY
Qty: 30 CAPSULE | Refills: 3 | Status: SHIPPED | OUTPATIENT
Start: 2023-08-28

## 2023-09-06 ENCOUNTER — OFFICE VISIT (OUTPATIENT)
Age: 63
End: 2023-09-06
Payer: COMMERCIAL

## 2023-09-06 VITALS
HEART RATE: 83 BPM | OXYGEN SATURATION: 98 % | HEIGHT: 66 IN | DIASTOLIC BLOOD PRESSURE: 83 MMHG | WEIGHT: 146.5 LBS | TEMPERATURE: 98.3 F | SYSTOLIC BLOOD PRESSURE: 126 MMHG | BODY MASS INDEX: 23.54 KG/M2

## 2023-09-06 DIAGNOSIS — C61 PROSTATE CANCER (HCC): ICD-10-CM

## 2023-09-06 DIAGNOSIS — G62.9 PERIPHERAL POLYNEUROPATHY: ICD-10-CM

## 2023-09-06 DIAGNOSIS — I10 ESSENTIAL HYPERTENSION: ICD-10-CM

## 2023-09-06 DIAGNOSIS — E78.00 HYPERCHOLESTEREMIA: ICD-10-CM

## 2023-09-06 DIAGNOSIS — R73.03 PREDIABETES: Primary | ICD-10-CM

## 2023-09-06 PROCEDURE — 3079F DIAST BP 80-89 MM HG: CPT | Performed by: STUDENT IN AN ORGANIZED HEALTH CARE EDUCATION/TRAINING PROGRAM

## 2023-09-06 PROCEDURE — 99214 OFFICE O/P EST MOD 30 MIN: CPT | Performed by: STUDENT IN AN ORGANIZED HEALTH CARE EDUCATION/TRAINING PROGRAM

## 2023-09-06 PROCEDURE — 3074F SYST BP LT 130 MM HG: CPT | Performed by: STUDENT IN AN ORGANIZED HEALTH CARE EDUCATION/TRAINING PROGRAM

## 2023-09-06 NOTE — ASSESSMENT & PLAN NOTE
S/p prostatectomy  Lab Results   Component Value Date    PSA <0.1 08/22/2022    PSA <0.1 01/06/2022    PSA <0.1 07/27/2021

## 2023-09-06 NOTE — PROGRESS NOTES
Progress Note    he is a 58y.o. year old male who presents for evaluation of Lab Collection (6 month f/up)        Assessment/ Plan:     1. Prediabetes  Assessment & Plan:  Diagnosed with an A1c 6.2% May 2016. A1c has never been in the diabetic range. Encouraged low-carb diet  Discussed metformin, patient prefers to hold off. Orders:  -     Hemoglobin A1C; Future  2. Essential hypertension  Assessment & Plan:   Well-controlled, continue current medications: Amlodipine 10 mg, losartan 50 mg daily  Orders:  -     CBC; Future  -     Comprehensive Metabolic Panel; Future  -     TSH; Future  3. Peripheral polyneuropathy  Assessment & Plan:   Monitored by specialist- no acute findings meriting change in the plan  4. Prostate cancer [facility])  Assessment & Plan:  S/p prostatectomy  Lab Results   Component Value Date    PSA <0.1 08/22/2022    PSA <0.1 01/06/2022    PSA <0.1 07/27/2021     Orders:  -     PSA Screening; Future  5. Hypercholesteremia  -     Lipid Panel; Future        Return in about 6 months (around 3/6/2024) for follow-up prediabetes with labs. I have discussed the diagnosis with the patient and the intended plan as seen in the above orders. The patient has received an after-visit summary and questions were answered concerning future plans. Pt conveyed understanding of plan. Medication Side Effects and Warnings were discussed with patient        Subjective:     Chief Complaint   Patient presents with    Lab Collection     6 month f/up     Generally with numbness, occasionally with pins and needles. Occasionally uncomfortable. Occasionally feet feel really cold. He hasn't noticed much difference despite medication changes. He doesn't drink sugary beverages or eat much sweets. 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 Doesn't eat out  He has been trying to work out more. Got an exercise bike.       Reviewed PmHx, RxHx, FmHx, SocHx, AllgHx and updated and

## 2023-09-06 NOTE — ASSESSMENT & PLAN NOTE
Diagnosed with an A1c 6.2% May 2016. A1c has never been in the diabetic range. Encouraged low-carb diet  Discussed metformin, patient prefers to hold off.

## 2023-09-07 LAB
ALBUMIN SERPL-MCNC: 4.5 G/DL (ref 3.9–4.9)
ALBUMIN/GLOB SERPL: 2 {RATIO} (ref 1.2–2.2)
ALP SERPL-CCNC: 105 IU/L (ref 44–121)
ALT SERPL-CCNC: 20 IU/L (ref 0–44)
AST SERPL-CCNC: 18 IU/L (ref 0–40)
BILIRUB SERPL-MCNC: 0.9 MG/DL (ref 0–1.2)
BUN SERPL-MCNC: 13 MG/DL (ref 8–27)
BUN/CREAT SERPL: 12 (ref 10–24)
CALCIUM SERPL-MCNC: 9.6 MG/DL (ref 8.6–10.2)
CHLORIDE SERPL-SCNC: 100 MMOL/L (ref 96–106)
CHOLEST SERPL-MCNC: 120 MG/DL (ref 100–199)
CO2 SERPL-SCNC: 25 MMOL/L (ref 20–29)
CREAT SERPL-MCNC: 1.07 MG/DL (ref 0.76–1.27)
EGFRCR SERPLBLD CKD-EPI 2021: 77 ML/MIN/1.73
ERYTHROCYTE [DISTWIDTH] IN BLOOD BY AUTOMATED COUNT: 13.1 % (ref 11.6–15.4)
GLOBULIN SER CALC-MCNC: 2.3 G/DL (ref 1.5–4.5)
GLUCOSE SERPL-MCNC: 104 MG/DL (ref 70–99)
HBA1C MFR BLD: 6.2 % (ref 4.8–5.6)
HCT VFR BLD AUTO: 41.7 % (ref 37.5–51)
HDLC SERPL-MCNC: 62 MG/DL
HGB BLD-MCNC: 14.3 G/DL (ref 13–17.7)
IMP & REVIEW OF LAB RESULTS: NORMAL
LDLC SERPL CALC-MCNC: 45 MG/DL (ref 0–99)
MCH RBC QN AUTO: 30.9 PG (ref 26.6–33)
MCHC RBC AUTO-ENTMCNC: 34.3 G/DL (ref 31.5–35.7)
MCV RBC AUTO: 90 FL (ref 79–97)
PLATELET # BLD AUTO: 278 X10E3/UL (ref 150–450)
POTASSIUM SERPL-SCNC: 4 MMOL/L (ref 3.5–5.2)
PROT SERPL-MCNC: 6.8 G/DL (ref 6–8.5)
PSA SERPL-MCNC: <0.1 NG/ML (ref 0–4)
RBC # BLD AUTO: 4.63 X10E6/UL (ref 4.14–5.8)
SODIUM SERPL-SCNC: 140 MMOL/L (ref 134–144)
TRIGL SERPL-MCNC: 57 MG/DL (ref 0–149)
TSH SERPL DL<=0.005 MIU/L-ACNC: 2.19 UIU/ML (ref 0.45–4.5)
VLDLC SERPL CALC-MCNC: 13 MG/DL (ref 5–40)
WBC # BLD AUTO: 3.5 X10E3/UL (ref 3.4–10.8)

## 2023-10-03 NOTE — PROGRESS NOTES
Please let him know that his labs do confirm that he has an overactive thyroid, refer to Dr. Klaudia Diez for eval. Chadd Herrera Seeing nephrology  Stable

## 2023-11-01 RX ORDER — AMLODIPINE BESYLATE 10 MG/1
TABLET ORAL
Qty: 90 TABLET | Refills: 1 | Status: SHIPPED | OUTPATIENT
Start: 2023-11-01

## 2023-12-01 DIAGNOSIS — I10 ESSENTIAL (PRIMARY) HYPERTENSION: ICD-10-CM

## 2023-12-01 RX ORDER — LOSARTAN POTASSIUM 50 MG/1
TABLET ORAL
Qty: 90 TABLET | Refills: 1 | Status: SHIPPED | OUTPATIENT
Start: 2023-12-01

## 2023-12-04 ENCOUNTER — OFFICE VISIT (OUTPATIENT)
Age: 63
End: 2023-12-04
Payer: COMMERCIAL

## 2023-12-04 VITALS
OXYGEN SATURATION: 97 % | BODY MASS INDEX: 23.46 KG/M2 | WEIGHT: 146 LBS | SYSTOLIC BLOOD PRESSURE: 140 MMHG | TEMPERATURE: 98 F | RESPIRATION RATE: 20 BRPM | DIASTOLIC BLOOD PRESSURE: 80 MMHG | HEIGHT: 66 IN

## 2023-12-04 DIAGNOSIS — R20.2 PARESTHESIA: Primary | ICD-10-CM

## 2023-12-04 PROCEDURE — 3077F SYST BP >= 140 MM HG: CPT | Performed by: PSYCHIATRY & NEUROLOGY

## 2023-12-04 PROCEDURE — 3079F DIAST BP 80-89 MM HG: CPT | Performed by: PSYCHIATRY & NEUROLOGY

## 2023-12-04 PROCEDURE — 99214 OFFICE O/P EST MOD 30 MIN: CPT | Performed by: PSYCHIATRY & NEUROLOGY

## 2023-12-20 ENCOUNTER — TELEPHONE (OUTPATIENT)
Age: 63
End: 2023-12-20

## 2023-12-29 RX ORDER — ATORVASTATIN CALCIUM 40 MG/1
TABLET, FILM COATED ORAL
Qty: 90 TABLET | Refills: 1 | Status: SHIPPED | OUTPATIENT
Start: 2023-12-29

## 2024-01-10 NOTE — TELEPHONE ENCOUNTER
Called pt. No answer left message on VM stating that I was calling to get him scheduled for a skin biopsy. Stated for pt to call office to get scheduled.

## 2024-01-10 NOTE — TELEPHONE ENCOUNTER
Contacted patients insurance spoke with Pamela who verified that CPT codes:75570,50093, R20.2 does not require authorization, nor does it require pre-determination.       Call ref# [id number]    Nurse notified

## 2024-01-12 NOTE — TELEPHONE ENCOUNTER
Called pt.  verified. Pt is scheduled for a skin biopsy with Dr. Hollins on 24 at 10:30AM.Pt verified mailing address on file to mail a copy of the appt reminder.

## 2024-02-06 ENCOUNTER — PROCEDURE VISIT (OUTPATIENT)
Age: 64
End: 2024-02-06
Payer: COMMERCIAL

## 2024-02-06 DIAGNOSIS — R20.2 PARESTHESIA: Primary | ICD-10-CM

## 2024-02-06 PROCEDURE — 11104 PUNCH BX SKIN SINGLE LESION: CPT | Performed by: PSYCHIATRY & NEUROLOGY

## 2024-02-06 PROCEDURE — 11105 PUNCH BX SKIN EA SEP/ADDL: CPT | Performed by: PSYCHIATRY & NEUROLOGY

## 2024-02-06 NOTE — PROGRESS NOTES
Name:   Jared Boucher  Age/ Sex:   63 y.o. male   MRN:    [id number]  Procedure:   SKIN BIOPSY ([facility] skin biopsy)       Procedure Date:  02/06/2024    ICD-10 Code:   No diagnosis found.  CPT Code:  11104 x 1, 11105 x 2    Time Out performed immediately prior to the start of procedure:  I, Javier Hollins MD, have performed the following review on Jared Boucher prior to the start of the procedure: Skin Biopsy.  The patient was identified by name and date of birth.  Agreement on the procedure to be performed was verified.  The potential Benefits and potential Risks were explained to the patient.  The procedure site(s) were verified and marked as necessary.  Consent was signed and verified.  The patient was positioned for comfort.  Time: 10:30 AM.  Procedure performed by: Javier Hollins MD.  Provider assisted by: none.  Patient assisted by: self.  How patient tolerated procedure: mild procedure-related pain, no complications.  Comments: none.    TECHNICAL:  The procedure and potential complications were explained to the patient, and verbal consent was obtained.  The skin was cleansed with the betadine swabs over the left EDB, 10 cm proximal to the left lateral malleolus, and 5 cm proximal to the ulnar side of the left wrist on the dorsal surface.  The cleaned areas were infiltrated with 1% lidocaine with epinephrine subcutaneous.  A skin punch biopsy was performed at each site with the provided biopsy tool.  Each sample was placed into the provided tubes/ containers (each labeled using the provided labels), lids tightened, placed back into the styrofoam garcia and then placed on ice in the supplied styrofoam box and shipped back to [facility] via overnight delivery/ [employer].      The biopsy sites were cleaned with alcohol swabs, then bandages applied.  The patient was given post-biopsy instructions regarding aftercare. There were no immediate or obvious complications and the patient did not complain of pain

## 2024-03-06 ENCOUNTER — OFFICE VISIT (OUTPATIENT)
Age: 64
End: 2024-03-06
Payer: COMMERCIAL

## 2024-03-06 VITALS
SYSTOLIC BLOOD PRESSURE: 127 MMHG | BODY MASS INDEX: 23.46 KG/M2 | HEART RATE: 74 BPM | WEIGHT: 146 LBS | HEIGHT: 66 IN | DIASTOLIC BLOOD PRESSURE: 79 MMHG | OXYGEN SATURATION: 99 % | RESPIRATION RATE: 17 BRPM | TEMPERATURE: 97.4 F

## 2024-03-06 DIAGNOSIS — G62.9 PERIPHERAL POLYNEUROPATHY: ICD-10-CM

## 2024-03-06 DIAGNOSIS — R79.89 LOW TSH LEVEL: ICD-10-CM

## 2024-03-06 DIAGNOSIS — H91.90 HEARING LOSS, UNSPECIFIED HEARING LOSS TYPE, UNSPECIFIED LATERALITY: ICD-10-CM

## 2024-03-06 DIAGNOSIS — F51.04 PSYCHOPHYSIOLOGICAL INSOMNIA: Primary | ICD-10-CM

## 2024-03-06 DIAGNOSIS — I10 ESSENTIAL HYPERTENSION: ICD-10-CM

## 2024-03-06 DIAGNOSIS — R73.03 PREDIABETES: ICD-10-CM

## 2024-03-06 DIAGNOSIS — E78.00 HYPERCHOLESTEREMIA: ICD-10-CM

## 2024-03-06 DIAGNOSIS — Z86.69 HISTORY OF OBSTRUCTIVE SLEEP APNEA: ICD-10-CM

## 2024-03-06 PROCEDURE — 99214 OFFICE O/P EST MOD 30 MIN: CPT | Performed by: STUDENT IN AN ORGANIZED HEALTH CARE EDUCATION/TRAINING PROGRAM

## 2024-03-06 PROCEDURE — 3078F DIAST BP <80 MM HG: CPT | Performed by: STUDENT IN AN ORGANIZED HEALTH CARE EDUCATION/TRAINING PROGRAM

## 2024-03-06 PROCEDURE — 3074F SYST BP LT 130 MM HG: CPT | Performed by: STUDENT IN AN ORGANIZED HEALTH CARE EDUCATION/TRAINING PROGRAM

## 2024-03-06 RX ORDER — LOSARTAN POTASSIUM 50 MG/1
50 TABLET ORAL DAILY
Qty: 90 TABLET | Refills: 1 | Status: SHIPPED | OUTPATIENT
Start: 2024-03-06

## 2024-03-06 RX ORDER — ATORVASTATIN CALCIUM 40 MG/1
40 TABLET, FILM COATED ORAL DAILY
Qty: 90 TABLET | Refills: 1 | Status: SHIPPED | OUTPATIENT
Start: 2024-03-06

## 2024-03-06 RX ORDER — TRAZODONE HYDROCHLORIDE 50 MG/1
25-50 TABLET ORAL NIGHTLY PRN
Qty: 30 TABLET | Refills: 5 | Status: SHIPPED | OUTPATIENT
Start: 2024-03-06

## 2024-03-06 RX ORDER — AMLODIPINE BESYLATE 10 MG/1
10 TABLET ORAL DAILY
Qty: 90 TABLET | Refills: 1 | Status: SHIPPED | OUTPATIENT
Start: 2024-03-06

## 2024-03-06 RX ORDER — DULOXETIN HYDROCHLORIDE 60 MG/1
60 CAPSULE, DELAYED RELEASE ORAL DAILY
Qty: 90 CAPSULE | Refills: 1 | Status: SHIPPED | OUTPATIENT
Start: 2024-03-06

## 2024-03-06 SDOH — ECONOMIC STABILITY: HOUSING INSECURITY
IN THE LAST 12 MONTHS, WAS THERE A TIME WHEN YOU DID NOT HAVE A STEADY PLACE TO SLEEP OR SLEPT IN A SHELTER (INCLUDING NOW)?: NO

## 2024-03-06 SDOH — ECONOMIC STABILITY: FOOD INSECURITY: WITHIN THE PAST 12 MONTHS, YOU WORRIED THAT YOUR FOOD WOULD RUN OUT BEFORE YOU GOT MONEY TO BUY MORE.: NEVER TRUE

## 2024-03-06 SDOH — ECONOMIC STABILITY: FOOD INSECURITY: WITHIN THE PAST 12 MONTHS, THE FOOD YOU BOUGHT JUST DIDN'T LAST AND YOU DIDN'T HAVE MONEY TO GET MORE.: NEVER TRUE

## 2024-03-06 SDOH — ECONOMIC STABILITY: INCOME INSECURITY: HOW HARD IS IT FOR YOU TO PAY FOR THE VERY BASICS LIKE FOOD, HOUSING, MEDICAL CARE, AND HEATING?: NOT HARD AT ALL

## 2024-03-06 ASSESSMENT — PATIENT HEALTH QUESTIONNAIRE - PHQ9
SUM OF ALL RESPONSES TO PHQ QUESTIONS 1-9: 0
SUM OF ALL RESPONSES TO PHQ QUESTIONS 1-9: 0
2. FEELING DOWN, DEPRESSED OR HOPELESS: 0
SUM OF ALL RESPONSES TO PHQ QUESTIONS 1-9: 0
SUM OF ALL RESPONSES TO PHQ QUESTIONS 1-9: 0
1. LITTLE INTEREST OR PLEASURE IN DOING THINGS: 0
SUM OF ALL RESPONSES TO PHQ9 QUESTIONS 1 & 2: 0

## 2024-03-06 NOTE — PROGRESS NOTES
Chief Complaint   Patient presents with    PreDiabetes       \"Have you been to the ER, urgent care clinic since your last visit?  Hospitalized since your last visit?\"    NO    “Have you seen or consulted any other health care providers outside of [facility] since your last visit?”    NO

## 2024-03-06 NOTE — PROGRESS NOTES
Progress Note    he is a 63 y.o. year old male who presents for evaluation of PreDiabetes        Assessment/ Plan:     1. Psychophysiological insomnia  Uncontrolled, start trazodone  Recommended to have reevaluation for sleep apnea given history and current symptoms  Encouraged to work on sleep hygiene  -     traZODone (DESYREL) 50 MG tablet; Take 0.5-1 tablets by mouth nightly as needed for Sleep, Disp-30 tablet, R-5Normal  2. History of obstructive sleep apnea  [facility]    3. Essential hypertension  Well-controlled, continue current medications  -     CBC; Future  -     Comprehensive Metabolic Panel; Future  -     losartan (COZAAR) 50 MG tablet; Take 1 tablet by mouth daily, Disp-90 tablet, R-1Normal  -     amLODIPine (NORVASC) 10 MG tablet; Take 1 tablet by mouth daily, Disp-90 tablet, R-1Normal    4. Prediabetes  No current medications, reassess with labs  -     Hemoglobin A1C; Future    5. Hypercholesteremia  Continue current medications pending lab results  -     Lipid Panel; Future  -     atorvastatin (LIPITOR) 40 MG tablet; Take 1 tablet by mouth daily, Disp-90 tablet, R-1Normal    6. Low TSH level  -     TSH; Future  -     T4, Free; Future    7. Peripheral polyneuropathy  Borderline controlled, continue current medication  -     DULoxetine (CYMBALTA) 60 MG extended release capsule; Take 1 capsule by mouth daily, Disp-90 capsule, R-1Normal    8. Hearing loss, unspecified hearing loss type, unspecified laterality  Refer for further evaluation, location per patient request  -     [facility] - Mariano Becerra MD, OtolaryngologyArtemio ([address])      Return in about 6 months (around 9/6/2024) for follow-up prerdiabetes and blood pressure, sooner as needed for pain or sleep trouble.      I have discussed the diagnosis with the patient and the intended plan as seen in the above orders.    The patient has received an after-visit summary and questions were answered

## 2024-03-07 LAB
ALBUMIN SERPL-MCNC: 4.5 G/DL (ref 3.9–4.9)
ALBUMIN/GLOB SERPL: 2.1 {RATIO} (ref 1.2–2.2)
ALP SERPL-CCNC: 129 IU/L (ref 44–121)
ALT SERPL-CCNC: 16 IU/L (ref 0–44)
AST SERPL-CCNC: 20 IU/L (ref 0–40)
BILIRUB SERPL-MCNC: 0.6 MG/DL (ref 0–1.2)
BUN SERPL-MCNC: 11 MG/DL (ref 8–27)
BUN/CREAT SERPL: 10 (ref 10–24)
CALCIUM SERPL-MCNC: 9.5 MG/DL (ref 8.6–10.2)
CHLORIDE SERPL-SCNC: 102 MMOL/L (ref 96–106)
CHOLEST SERPL-MCNC: 126 MG/DL (ref 100–199)
CO2 SERPL-SCNC: 27 MMOL/L (ref 20–29)
CREAT SERPL-MCNC: 1.05 MG/DL (ref 0.76–1.27)
EGFRCR SERPLBLD CKD-EPI 2021: 79 ML/MIN/1.73
ERYTHROCYTE [DISTWIDTH] IN BLOOD BY AUTOMATED COUNT: 13.4 % (ref 11.6–15.4)
GLOBULIN SER CALC-MCNC: 2.1 G/DL (ref 1.5–4.5)
GLUCOSE SERPL-MCNC: 94 MG/DL (ref 70–99)
HBA1C MFR BLD: 6.3 % (ref 4.8–5.6)
HCT VFR BLD AUTO: 40.8 % (ref 37.5–51)
HDLC SERPL-MCNC: 64 MG/DL
HGB BLD-MCNC: 13.7 G/DL (ref 13–17.7)
IMP & REVIEW OF LAB RESULTS: NORMAL
LDLC SERPL CALC-MCNC: 52 MG/DL (ref 0–99)
MCH RBC QN AUTO: 30.5 PG (ref 26.6–33)
MCHC RBC AUTO-ENTMCNC: 33.6 G/DL (ref 31.5–35.7)
MCV RBC AUTO: 91 FL (ref 79–97)
PLATELET # BLD AUTO: 274 X10E3/UL (ref 150–450)
POTASSIUM SERPL-SCNC: 4.3 MMOL/L (ref 3.5–5.2)
PROT SERPL-MCNC: 6.6 G/DL (ref 6–8.5)
RBC # BLD AUTO: 4.49 X10E6/UL (ref 4.14–5.8)
SODIUM SERPL-SCNC: 141 MMOL/L (ref 134–144)
T4 FREE SERPL-MCNC: 0.83 NG/DL (ref 0.82–1.77)
TRIGL SERPL-MCNC: 38 MG/DL (ref 0–149)
TSH SERPL DL<=0.005 MIU/L-ACNC: 1.88 UIU/ML (ref 0.45–4.5)
VLDLC SERPL CALC-MCNC: 10 MG/DL (ref 5–40)
WBC # BLD AUTO: 3.1 X10E3/UL (ref 3.4–10.8)

## 2024-03-14 PROBLEM — F51.04 PSYCHOPHYSIOLOGICAL INSOMNIA: Status: ACTIVE | Noted: 2024-03-14

## 2024-03-14 PROBLEM — H91.90 HEARING LOSS: Status: ACTIVE | Noted: 2024-03-14

## 2024-04-02 DIAGNOSIS — F51.04 PSYCHOPHYSIOLOGICAL INSOMNIA: ICD-10-CM

## 2024-04-04 RX ORDER — TRAZODONE HYDROCHLORIDE 50 MG/1
25-50 TABLET ORAL NIGHTLY PRN
Qty: 30 TABLET | Refills: 5 | OUTPATIENT
Start: 2024-04-04

## 2024-04-04 NOTE — PROGRESS NOTES
Neurology Progress Note    Patient ID:  Jared Boucher  [id number]  64 y.o.  1960      Subjective:   History:  Jared Boucher is a 63 y.o. male who  has a past medical history of Hypertension, Prostate cancer (HCC),  prediabetes and Sleep apnea. who since 2018, noted pain and numbness described as needle stick in both hands and feet. (-) weakness. Saw an unrecalled neurologist. Did an EMG (?) with unrecalled results.  Was taking Amitriptyline 100 mg but caused him to see things so was stopped. EMG/NCS of the L UE and L LE (5/25/23): normal    Cymbalta and alpha lipoic acid not helping so he stopped it.      ROS:  Per HPI-  Otherwise the remainder of ROS was negative    Social Hx  Social History     Socioeconomic History    Marital status: Single   Tobacco Use    Smoking status: Never    Smokeless tobacco: Never   Substance and Sexual Activity    Alcohol use: Yes    Drug use: No     Social Determinants of Health     Financial Resource Strain: Low Risk  (3/6/2024)    Overall Financial Resource Strain (CARDIA)     Difficulty of Paying Living Expenses: Not hard at all   Food Insecurity: No Food Insecurity (3/6/2024)    Hunger Vital Sign     Worried About Running Out of Food in the Last Year: Never true     Ran Out of Food in the Last Year: Never true   Transportation Needs: Unknown (3/6/2024)    PRAPARE - Transportation     Lack of Transportation (Non-Medical): No   Housing Stability: Unknown (3/6/2024)    Housing Stability Vital Sign     Unstable Housing in the Last Year: No       Meds:  Current Outpatient Medications on File Prior to Visit   Medication Sig Dispense Refill    traZODone (DESYREL) 50 MG tablet Take 0.5-1 tablets by mouth nightly as needed for Sleep 30 tablet 5    losartan (COZAAR) 50 MG tablet Take 1 tablet by mouth daily 90 tablet 1    DULoxetine (CYMBALTA) 60 MG extended release capsule Take 1 capsule by mouth daily 90 capsule 1    amLODIPine (NORVASC) 10 MG tablet Take 1 tablet by mouth daily 90

## 2024-04-05 ENCOUNTER — OFFICE VISIT (OUTPATIENT)
Age: 64
End: 2024-04-05
Payer: COMMERCIAL

## 2024-04-05 VITALS
HEART RATE: 93 BPM | BODY MASS INDEX: 23.57 KG/M2 | SYSTOLIC BLOOD PRESSURE: 120 MMHG | TEMPERATURE: 96.8 F | HEIGHT: 66 IN | DIASTOLIC BLOOD PRESSURE: 70 MMHG | OXYGEN SATURATION: 97 %

## 2024-04-05 DIAGNOSIS — M79.609 PAIN IN EXTREMITY, UNSPECIFIED EXTREMITY: ICD-10-CM

## 2024-04-05 DIAGNOSIS — R20.2 PARESTHESIA: Primary | ICD-10-CM

## 2024-04-05 PROCEDURE — 99214 OFFICE O/P EST MOD 30 MIN: CPT | Performed by: PSYCHIATRY & NEUROLOGY

## 2024-04-05 PROCEDURE — 3074F SYST BP LT 130 MM HG: CPT | Performed by: PSYCHIATRY & NEUROLOGY

## 2024-04-05 PROCEDURE — 1123F ACP DISCUSS/DSCN MKR DOCD: CPT | Performed by: PSYCHIATRY & NEUROLOGY

## 2024-04-05 PROCEDURE — 3078F DIAST BP <80 MM HG: CPT | Performed by: PSYCHIATRY & NEUROLOGY

## 2024-04-22 ENCOUNTER — TELEPHONE (OUTPATIENT)
Age: 64
End: 2024-04-22

## 2024-04-22 NOTE — TELEPHONE ENCOUNTER
Pt came into the office and dropped off the medical records from [facility] for provider's review.    Medical records have been placed in the mailbox in the front office     Pt can be reached at [phone] if dom

## 2024-04-23 ENCOUNTER — OFFICE VISIT (OUTPATIENT)
Age: 64
End: 2024-04-23
Payer: COMMERCIAL

## 2024-04-23 VITALS
SYSTOLIC BLOOD PRESSURE: 120 MMHG | TEMPERATURE: 98 F | HEART RATE: 78 BPM | WEIGHT: 147 LBS | HEIGHT: 66 IN | RESPIRATION RATE: 20 BRPM | OXYGEN SATURATION: 98 % | BODY MASS INDEX: 23.63 KG/M2 | DIASTOLIC BLOOD PRESSURE: 79 MMHG

## 2024-04-23 DIAGNOSIS — M54.50 LUMBAR PAIN: Primary | ICD-10-CM

## 2024-04-23 PROCEDURE — 3074F SYST BP LT 130 MM HG: CPT | Performed by: PHYSICIAN ASSISTANT

## 2024-04-23 PROCEDURE — 3078F DIAST BP <80 MM HG: CPT | Performed by: PHYSICIAN ASSISTANT

## 2024-04-23 PROCEDURE — 99213 OFFICE O/P EST LOW 20 MIN: CPT | Performed by: PHYSICIAN ASSISTANT

## 2024-04-23 PROCEDURE — 1123F ACP DISCUSS/DSCN MKR DOCD: CPT | Performed by: PHYSICIAN ASSISTANT

## 2024-04-23 RX ORDER — CYCLOBENZAPRINE HCL 10 MG
10 TABLET ORAL 3 TIMES DAILY PRN
Qty: 30 TABLET | Refills: 0 | Status: SHIPPED | OUTPATIENT
Start: 2024-04-23 | End: 2024-05-03

## 2024-04-23 RX ORDER — NAPROXEN 500 MG/1
500 TABLET ORAL 2 TIMES DAILY WITH MEALS
Qty: 14 TABLET | Refills: 0 | Status: SHIPPED | OUTPATIENT
Start: 2024-04-23

## 2024-04-23 ASSESSMENT — PATIENT HEALTH QUESTIONNAIRE - PHQ9
SUM OF ALL RESPONSES TO PHQ QUESTIONS 1-9: 0
SUM OF ALL RESPONSES TO PHQ9 QUESTIONS 1 & 2: 0
SUM OF ALL RESPONSES TO PHQ QUESTIONS 1-9: 0
SUM OF ALL RESPONSES TO PHQ QUESTIONS 1-9: 0
2. FEELING DOWN, DEPRESSED OR HOPELESS: NOT AT ALL
1. LITTLE INTEREST OR PLEASURE IN DOING THINGS: NOT AT ALL
SUM OF ALL RESPONSES TO PHQ QUESTIONS 1-9: 0

## 2024-04-23 NOTE — PROGRESS NOTES
Jared Boucher is a 64 y.o. male , id x 2(name and ). Reviewed record, history, and  medications.      Chief Complaint   Patient presents with    Back Pain     Patient c/o back pain, pulled muscle yesterday, patient was working out.          Vitals:    24 0823   Weight: 66.7 kg (147 lb)   Height: 1.676 m (5' 6\")               2024     8:26 AM   PHQ-9    Little interest or pleasure in doing things 0   Feeling down, depressed, or hopeless 0   PHQ-2 Score 0   PHQ-9 Total Score 0            No data to display                Social Determinants of Health     Tobacco Use: Low Risk  (3/6/2024)    Patient History     Smoking Tobacco Use: Never     Smokeless Tobacco Use: Never     Passive Exposure: Not on file   Alcohol Use: Not on file   Financial Resource Strain: Low Risk  (3/6/2024)    Overall Financial Resource Strain (CARDIA)     Difficulty of Paying Living Expenses: Not hard at all   Food Insecurity: No Food Insecurity (3/6/2024)    Hunger Vital Sign     Worried About Running Out of Food in the Last Year: Never true     Ran Out of Food in the Last Year: Never true   Transportation Needs: Unknown (3/6/2024)    PRAPARE - Transportation     Lack of Transportation (Medical): Not on file     Lack of Transportation (Non-Medical): No   Physical Activity: Not on file   Stress: Not on file   Social Connections: Not on file   Intimate Partner Violence: Not on file   Depression: Not at risk (3/6/2024)    PHQ-2     PHQ-2 Score: 0   Housing Stability: Unknown (3/6/2024)    Housing Stability Vital Sign     Unable to Pay for Housing in the Last Year: Not on file     Number of Places Lived in the Last Year: Not on file     Unstable Housing in the Last Year: No   Interpersonal Safety: Not on file   Utilities: Not on file       \"Have you been to the ER, urgent care clinic since your last visit?  Hospitalized since your last visit?\"    NO    “Have you seen or consulted any other health care providers outside of [facility]

## 2024-04-23 NOTE — PROGRESS NOTES
Jared Boucher is a 64 y.o. male , id x 2(name and ). Reviewed record, history, and  medications.      Chief Complaint   Patient presents with    Back Pain     Patient c/o back pain, pulled muscle yesterday, patient was working out.          Vitals:    24 0823   BP: 120/79   Site: Left Upper Arm   Position: Sitting   Cuff Size: Large Adult   Pulse: 78   Resp: 20   Temp: 98 °F (36.7 °C)   TempSrc: Oral   SpO2: 98%   Weight: 66.7 kg (147 lb)   Height: 1.676 m (5' 6\")               2024     8:26 AM   PHQ-9    Little interest or pleasure in doing things 0   Feeling down, depressed, or hopeless 0   PHQ-2 Score 0   PHQ-9 Total Score 0            No data to display                Social Determinants of Health     Tobacco Use: Low Risk  (2024)    Patient History     Smoking Tobacco Use: Never     Smokeless Tobacco Use: Never     Passive Exposure: Not on file   Alcohol Use: Not on file   Financial Resource Strain: Low Risk  (3/6/2024)    Overall Financial Resource Strain (CARDIA)     Difficulty of Paying Living Expenses: Not hard at all   Food Insecurity: No Food Insecurity (3/6/2024)    Hunger Vital Sign     Worried About Running Out of Food in the Last Year: Never true     Ran Out of Food in the Last Year: Never true   Transportation Needs: Unknown (3/6/2024)    PRAPARE - Transportation     Lack of Transportation (Medical): Not on file     Lack of Transportation (Non-Medical): No   Physical Activity: Not on file   Stress: Not on file   Social Connections: Not on file   Intimate Partner Violence: Not on file   Depression: Not at risk (2024)    PHQ-2     PHQ-2 Score: 0   Housing Stability: Unknown (3/6/2024)    Housing Stability Vital Sign     Unable to Pay for Housing in the Last Year: Not on file     Number of Places Lived in the Last Year: Not on file     Unstable Housing in the Last Year: No   Interpersonal Safety: Not on file   Utilities: Not on file       \"Have you been to the ER, urgent care

## 2024-05-14 ENCOUNTER — OFFICE VISIT (OUTPATIENT)
Age: 64
End: 2024-05-14
Payer: COMMERCIAL

## 2024-05-14 VITALS
HEIGHT: 66 IN | SYSTOLIC BLOOD PRESSURE: 129 MMHG | BODY MASS INDEX: 23.82 KG/M2 | WEIGHT: 148.2 LBS | OXYGEN SATURATION: 100 % | DIASTOLIC BLOOD PRESSURE: 82 MMHG | HEART RATE: 91 BPM

## 2024-05-14 DIAGNOSIS — Z78.9 INTOLERANCE OF CONTINUOUS POSITIVE AIRWAY PRESSURE (CPAP) VENTILATION: ICD-10-CM

## 2024-05-14 DIAGNOSIS — G47.33 OSA (OBSTRUCTIVE SLEEP APNEA): Primary | ICD-10-CM

## 2024-05-14 PROCEDURE — 1123F ACP DISCUSS/DSCN MKR DOCD: CPT | Performed by: SPECIALIST

## 2024-05-14 PROCEDURE — 99203 OFFICE O/P NEW LOW 30 MIN: CPT | Performed by: SPECIALIST

## 2024-05-14 PROCEDURE — 3074F SYST BP LT 130 MM HG: CPT | Performed by: SPECIALIST

## 2024-05-14 PROCEDURE — 3079F DIAST BP 80-89 MM HG: CPT | Performed by: SPECIALIST

## 2024-05-14 ASSESSMENT — SLEEP AND FATIGUE QUESTIONNAIRES
HOW LIKELY ARE YOU TO NOD OFF OR FALL ASLEEP IN A CAR, WHILE STOPPED FOR A FEW MINUTES IN TRAFFIC: WOULD NEVER DOZE
HOW LIKELY ARE YOU TO NOD OFF OR FALL ASLEEP WHILE SITTING AND READING: SLIGHT CHANCE OF DOZING
HOW LIKELY ARE YOU TO NOD OFF OR FALL ASLEEP WHILE SITTING AND TALKING TO SOMEONE: WOULD NEVER DOZE
NECK CIRCUMFERENCE (INCHES): 16.5
HOW LIKELY ARE YOU TO NOD OFF OR FALL ASLEEP WHEN YOU ARE A PASSENGER IN A CAR FOR AN HOUR WITHOUT A BREAK: WOULD NEVER DOZE
HOW LIKELY ARE YOU TO NOD OFF OR FALL ASLEEP WHILE LYING DOWN TO REST IN THE AFTERNOON WHEN CIRCUMSTANCES PERMIT: SLIGHT CHANCE OF DOZING
HOW LIKELY ARE YOU TO NOD OFF OR FALL ASLEEP WHILE WATCHING TV: MODERATE CHANCE OF DOZING
ESS TOTAL SCORE: 9
HOW LIKELY ARE YOU TO NOD OFF OR FALL ASLEEP WHILE SITTING INACTIVE IN A PUBLIC PLACE: HIGH CHANCE OF DOZING
HOW LIKELY ARE YOU TO NOD OFF OR FALL ASLEEP WHILE SITTING QUIETLY AFTER LUNCH WITHOUT ALCOHOL: MODERATE CHANCE OF DOZING

## 2024-05-14 NOTE — PROGRESS NOTES
[facility] - [address]  [facility] SLEEP DISORDERS CENTER [facility]  [address] [facility] [address]  Dept: [phone]           [address]  [place] [address]  Tel.  [phone]  Fax. [phone] [address]  [place] [address]  Tel.  [phone]  Fax. [phone] [address] [place] [address]  Tel.  [phone]  Fax. [phone]       Chief Complaint       Chief Complaint   Patient presents with    Sleep Problem     NP refd by Carmen Aguirre MD for hx of LAWSON        HPI      Jared Boucher is 64 y.o. male seen for evaluation of a sleep disorder.     The patient reports he has experienced snoring and daytime sleepiness.  Notes having had a sleep study, potentially over 30 years ago.  Appears to have been started on CPAP which he was unable to continue.      Normally retires after midnight; awakens at 4: 30 AM having difficulty returning to sleep.  Does work out in the morning.  Often may fall asleep in the afternoon for 30 minutes.      May doze if seated and an active session when watching TV, in a public place, seated quietly after lunch.  Pierce Sleepiness Scale: 9           5/14/2024    11:41 AM   Sleep Medicine   Sitting and reading 1   Watching TV 2   Sitting, inactive in a public place (e.g. a theatre or a meeting) 3   As a passenger in a car for an hour without a break 0   Lying down to rest in the afternoon when circumstances permit 1   Sitting and talking to someone 0   Sitting quietly after a lunch without alcohol 2   In a car, while stopped for a few minutes in traffic 0   Pierce Sleepiness Score 9   Neck circumference (Inches) 16.5        No Known Allergies      Current Outpatient Medications:     naproxen (NAPROSYN) 500 MG tablet, Take 1 tablet by mouth 2 times daily (with meals), Disp: 14 tablet, Rfl: 0    traZODone (DESYREL) 50 MG tablet, Take 0.5-1 tablets by mouth nightly as needed for Sleep, Disp: 30

## 2024-06-18 ENCOUNTER — PROCEDURE VISIT (OUTPATIENT)
Age: 64
End: 2024-06-18

## 2024-06-18 DIAGNOSIS — G47.33 OSA (OBSTRUCTIVE SLEEP APNEA): Primary | ICD-10-CM

## 2024-06-18 NOTE — PROGRESS NOTES
[address]  [place] [address]  Tel.  [phone]  Fax. [phone] [address]  [place] [address]  Tel.  [phone]  Fax. [phone] [address] [place] [address]  Tel.  [phone]  Fax. [phone]       aJred Boucher is seen today to receive a WatchPAT home sleep apnea testing (HSAT) device.    The WatchPAT HSAT Agreement was reviewed and endorsed by patient.  General information regarding operation of the HSAT device was provided.  Patient was advised to watch the [facility] instructional video.  Patient was advised to contact patient support for any problems using the device.  Patient was advised to complete the Morning Questionnaire after awakening/ending the test.    There were no vitals taken for this visit.    Patient will return the HSAT device by noon unless otherwise approved.  Patient will be contacted with results once the study has been reviewed by the physician, typically within 10-15 business days.    SpectralmindT Serial Number [id number]

## 2024-06-30 ENCOUNTER — CLINICAL DOCUMENTATION (OUTPATIENT)
Age: 64
End: 2024-06-30

## 2024-06-30 DIAGNOSIS — G47.33 OSA (OBSTRUCTIVE SLEEP APNEA): Primary | ICD-10-CM

## 2024-07-28 DIAGNOSIS — M54.50 LUMBAR PAIN: ICD-10-CM

## 2024-07-29 RX ORDER — CYCLOBENZAPRINE HCL 10 MG
TABLET ORAL
Qty: 30 TABLET | Refills: 0 | Status: SHIPPED | OUTPATIENT
Start: 2024-07-29

## 2024-08-06 DIAGNOSIS — I10 ESSENTIAL HYPERTENSION: ICD-10-CM

## 2024-08-06 RX ORDER — AMLODIPINE BESYLATE 10 MG/1
10 TABLET ORAL DAILY
Qty: 90 TABLET | Refills: 0 | Status: SHIPPED | OUTPATIENT
Start: 2024-08-06

## 2024-08-24 DIAGNOSIS — I10 ESSENTIAL HYPERTENSION: ICD-10-CM

## 2024-08-26 RX ORDER — LOSARTAN POTASSIUM 50 MG/1
50 TABLET ORAL DAILY
Qty: 90 TABLET | Refills: 0 | Status: SHIPPED | OUTPATIENT
Start: 2024-08-26

## 2024-09-06 ENCOUNTER — HOSPITAL ENCOUNTER (OUTPATIENT)
Facility: HOSPITAL | Age: 64
Discharge: HOME | End: 2024-09-09
Payer: COMMERCIAL

## 2024-09-06 VITALS
RESPIRATION RATE: 12 BRPM | DIASTOLIC BLOOD PRESSURE: 91 MMHG | BODY MASS INDEX: 23.78 KG/M2 | OXYGEN SATURATION: 99 % | HEIGHT: 66 IN | TEMPERATURE: 97.6 F | SYSTOLIC BLOOD PRESSURE: 144 MMHG | WEIGHT: 148 LBS | HEART RATE: 91 BPM

## 2024-09-06 DIAGNOSIS — G47.33 OSA (OBSTRUCTIVE SLEEP APNEA): ICD-10-CM

## 2024-09-06 PROCEDURE — 95811 POLYSOM 6/>YRS CPAP 4/> PARM: CPT | Performed by: SPECIALIST

## 2024-09-15 ENCOUNTER — CLINICAL DOCUMENTATION (OUTPATIENT)
Age: 64
End: 2024-09-15

## 2024-09-15 DIAGNOSIS — G47.33 OSA (OBSTRUCTIVE SLEEP APNEA): Primary | ICD-10-CM

## 2024-09-17 ENCOUNTER — CLINICAL DOCUMENTATION (OUTPATIENT)
Age: 64
End: 2024-09-17

## 2024-10-17 ENCOUNTER — OFFICE VISIT (OUTPATIENT)
Age: 64
End: 2024-10-17
Payer: COMMERCIAL

## 2024-10-17 ENCOUNTER — HOSPITAL ENCOUNTER (OUTPATIENT)
Facility: HOSPITAL | Age: 64
Discharge: HOME | End: 2024-10-20
Payer: COMMERCIAL

## 2024-10-17 VITALS
OXYGEN SATURATION: 94 % | DIASTOLIC BLOOD PRESSURE: 86 MMHG | SYSTOLIC BLOOD PRESSURE: 136 MMHG | WEIGHT: 155 LBS | RESPIRATION RATE: 20 BRPM | TEMPERATURE: 97.5 F | HEART RATE: 105 BPM | HEIGHT: 66 IN | BODY MASS INDEX: 24.91 KG/M2

## 2024-10-17 DIAGNOSIS — E11.42 TYPE 2 DIABETES MELLITUS WITH DIABETIC POLYNEUROPATHY, WITHOUT LONG-TERM CURRENT USE OF INSULIN (HCC): ICD-10-CM

## 2024-10-17 DIAGNOSIS — C61 PROSTATE CANCER (HCC): ICD-10-CM

## 2024-10-17 DIAGNOSIS — M54.42 ACUTE BILATERAL LOW BACK PAIN WITH LEFT-SIDED SCIATICA: Primary | ICD-10-CM

## 2024-10-17 PROCEDURE — 99213 OFFICE O/P EST LOW 20 MIN: CPT | Performed by: PHYSICIAN ASSISTANT

## 2024-10-17 PROCEDURE — 72100 X-RAY EXAM L-S SPINE 2/3 VWS: CPT

## 2024-10-17 PROCEDURE — 3044F HG A1C LEVEL LT 7.0%: CPT | Performed by: PHYSICIAN ASSISTANT

## 2024-10-17 PROCEDURE — 3079F DIAST BP 80-89 MM HG: CPT | Performed by: PHYSICIAN ASSISTANT

## 2024-10-17 PROCEDURE — 1123F ACP DISCUSS/DSCN MKR DOCD: CPT | Performed by: PHYSICIAN ASSISTANT

## 2024-10-17 PROCEDURE — 3075F SYST BP GE 130 - 139MM HG: CPT | Performed by: PHYSICIAN ASSISTANT

## 2024-10-17 RX ORDER — METHYLPREDNISOLONE 4 MG
TABLET, DOSE PACK ORAL
Qty: 21 TABLET | Refills: 0 | Status: SHIPPED | OUTPATIENT
Start: 2024-10-17 | End: 2024-10-23

## 2024-10-17 RX ORDER — METHOCARBAMOL 750 MG/1
750 TABLET, FILM COATED ORAL 3 TIMES DAILY
Qty: 21 TABLET | Refills: 0 | Status: SHIPPED | OUTPATIENT
Start: 2024-10-17 | End: 2024-10-24

## 2024-10-17 ASSESSMENT — PATIENT HEALTH QUESTIONNAIRE - PHQ9
SUM OF ALL RESPONSES TO PHQ QUESTIONS 1-9: 0
SUM OF ALL RESPONSES TO PHQ QUESTIONS 1-9: 0
SUM OF ALL RESPONSES TO PHQ9 QUESTIONS 1 & 2: 0
SUM OF ALL RESPONSES TO PHQ QUESTIONS 1-9: 0
2. FEELING DOWN, DEPRESSED OR HOPELESS: NOT AT ALL
SUM OF ALL RESPONSES TO PHQ QUESTIONS 1-9: 0
1. LITTLE INTEREST OR PLEASURE IN DOING THINGS: NOT AT ALL

## 2024-10-17 NOTE — PROGRESS NOTES
your last visit?”    NO            Click Here for Release of Records Request    
daily for 7 days     Associated Diagnoses:  Acute bilateral low back pain with left-sided sciatica     methylPREDNISolone (MEDROL DOSEPACK) 4 MG tablet  10/17/24  10/23/24     Take by mouth. Use as directed     Associated Diagnoses:  Acute bilateral low back pain with left-sided sciatica     Multiple Vitamin (ONE-A-DAY MENS PO)  --  --     Associated Diagnoses:  --     naproxen (NAPROSYN) 500 MG tablet  04/23/24  --     Take 1 tablet by mouth 2 times daily (with meals)     Associated Diagnoses:  Lumbar pain     traZODone (DESYREL) 50 MG tablet  03/06/24  --     Take 0.5-1 tablets by mouth nightly as needed for Sleep     Associated Diagnoses:  Psychophysiological insomnia             Patient Active Problem List   Diagnosis Code    Essential hypertension I10    Low TSH level R79.89    Prostate cancer (HCC) C61    Peripheral polyneuropathy G62.9    Hypercholesteremia E78.00    Prediabetes R73.03    History of obstructive sleep apnea Z86.69    Psychophysiological insomnia F51.04    Hearing loss H91.90    Type 2 diabetes mellitus with diabetic polyneuropathy, without long-term current use of insulin (HCC) E11.42        Review of Systems - negative except as listed above in the HPI    Time was used for level of billing: no     The patient (or guardian, if applicable) and other individuals in attendance with the patient were advised that Artificial Intelligence will be utilized during this visit to record and process the conversation to generate a clinical note. The patient (or guardian, if applicable) and other individuals in attendance at the appointment consented to the use of AI, including the recording.           I have discussed the diagnosis with the patient and the intended plan as seen in the above orders.  The patient understands and agrees with the plan. The patient has received an after-visit summary and questions were answered concerning future plans.     Medication Side Effects and Warnings were discussed

## 2024-11-08 ENCOUNTER — OFFICE VISIT (OUTPATIENT)
Age: 64
End: 2024-11-08
Payer: COMMERCIAL

## 2024-11-08 VITALS
SYSTOLIC BLOOD PRESSURE: 137 MMHG | BODY MASS INDEX: 25.23 KG/M2 | OXYGEN SATURATION: 100 % | DIASTOLIC BLOOD PRESSURE: 83 MMHG | HEIGHT: 66 IN | WEIGHT: 157 LBS | HEART RATE: 72 BPM | RESPIRATION RATE: 18 BRPM

## 2024-11-08 DIAGNOSIS — M79.604 PAIN IN BOTH LOWER EXTREMITIES: ICD-10-CM

## 2024-11-08 DIAGNOSIS — M79.605 PAIN IN BOTH LOWER EXTREMITIES: ICD-10-CM

## 2024-11-08 DIAGNOSIS — I10 ESSENTIAL HYPERTENSION: ICD-10-CM

## 2024-11-08 DIAGNOSIS — R07.9 CHEST PAIN, UNSPECIFIED TYPE: Primary | ICD-10-CM

## 2024-11-08 DIAGNOSIS — E11.42 TYPE 2 DIABETES MELLITUS WITH DIABETIC POLYNEUROPATHY, WITHOUT LONG-TERM CURRENT USE OF INSULIN (HCC): ICD-10-CM

## 2024-11-08 DIAGNOSIS — E78.00 HYPERCHOLESTEREMIA: ICD-10-CM

## 2024-11-08 PROCEDURE — 1123F ACP DISCUSS/DSCN MKR DOCD: CPT | Performed by: STUDENT IN AN ORGANIZED HEALTH CARE EDUCATION/TRAINING PROGRAM

## 2024-11-08 PROCEDURE — 99214 OFFICE O/P EST MOD 30 MIN: CPT | Performed by: STUDENT IN AN ORGANIZED HEALTH CARE EDUCATION/TRAINING PROGRAM

## 2024-11-08 PROCEDURE — 3075F SYST BP GE 130 - 139MM HG: CPT | Performed by: STUDENT IN AN ORGANIZED HEALTH CARE EDUCATION/TRAINING PROGRAM

## 2024-11-08 PROCEDURE — 3079F DIAST BP 80-89 MM HG: CPT | Performed by: STUDENT IN AN ORGANIZED HEALTH CARE EDUCATION/TRAINING PROGRAM

## 2024-11-08 PROCEDURE — 3044F HG A1C LEVEL LT 7.0%: CPT | Performed by: STUDENT IN AN ORGANIZED HEALTH CARE EDUCATION/TRAINING PROGRAM

## 2024-11-08 RX ORDER — AMLODIPINE BESYLATE 10 MG/1
10 TABLET ORAL DAILY
Qty: 90 TABLET | Refills: 1 | Status: SHIPPED | OUTPATIENT
Start: 2024-11-08

## 2024-11-08 RX ORDER — LOSARTAN POTASSIUM 50 MG/1
50 TABLET ORAL DAILY
Qty: 90 TABLET | Refills: 0 | Status: SHIPPED | OUTPATIENT
Start: 2024-11-08 | End: 2024-11-08 | Stop reason: SDUPTHER

## 2024-11-08 RX ORDER — LOSARTAN POTASSIUM 50 MG/1
50 TABLET ORAL DAILY
Qty: 90 TABLET | Refills: 1 | Status: SHIPPED | OUTPATIENT
Start: 2024-11-08

## 2024-11-08 RX ORDER — ATORVASTATIN CALCIUM 40 MG/1
40 TABLET, FILM COATED ORAL DAILY
Qty: 90 TABLET | Refills: 1 | Status: SHIPPED | OUTPATIENT
Start: 2024-11-08

## 2024-11-08 NOTE — PROGRESS NOTES
The patient (or guardian, if applicable) and other individuals in attendance with the patient were advised that Artificial Intelligence will be utilized during this visit to record, process the conversation to generate a clinical note, and support improvement of the AI technology. The patient (or guardian, if applicable) and other individuals in attendance at the appointment consented to the use of AI, including the recording.        Jared Boucher is a 64 y.o. male , id x 2(name and ). Reviewed record, history, and  medications.    Chief Complaint   Patient presents with    Medication Check        Health Maintenance Due   Topic    Shingles vaccine (3 of 3)    Diabetic retinal exam     Diabetic Alb to Cr ratio (uACR) test     Diabetic foot exam     COVID-19 Vaccine ( season)    Prostate Specific Antigen (PSA) Screening or Monitoring        Wt Readings from Last 1 Encounters:   24 71.2 kg (157 lb)     BP Readings from Last 3 Encounters:   24 137/83   10/17/24 136/86   24 (!) 144/91     Pulse Readings from Last 1 Encounters:   24 72         Patient is currently accompanied by n/a & I have received verbal consent from Jared Boucher to discuss any/all medical information while he/she is present in the room.    Home BP cuff present today:  no    Home medication list or bottles present today: yes - all medications were updated.    Forms for completion: no    Chest pain/pressure/discomfort: yes, left side. He states that has been on going for a few months but every time its checked everything is ok. Recently pulled a muscle.     Shortness of breath:  no    Surgery within the next month:  no      Depression Screening:  :     Depression: Not at risk (10/17/2024)    PHQ-2     PHQ-2 Score: 0          Coordination of Care Questionnaire:  :     \"Have you been to the ER, urgent care clinic since your last visit?  Hospitalized since your last visit?\"    NO    “Have you seen or consulted

## 2024-11-08 NOTE — PROGRESS NOTES
Progress Note    he is a 64 y.o. year old male who presents for evaluation of Medication Check        Assessment/ Plan:     1. Chest pain, unspecified type  -     Exercise stress test; Future  -     EKG 12 lead; Future  2. Pain in both lower extremities  -     Vascular duplex lower extremity arteries bilateral with KEVIN; Future  3. Essential hypertension  -     CBC; Future  -     TSH; Future  -     losartan (COZAAR) 50 MG tablet; Take 1 tablet by mouth daily, Disp-90 tablet, R-1Normal  -     amLODIPine (NORVASC) 10 MG tablet; Take 1 tablet by mouth daily, Disp-90 tablet, R-1Normal  4. Type 2 diabetes mellitus with diabetic polyneuropathy, without long-term current use of insulin ([facility])  -     Comprehensive Metabolic Panel; Future  -     Hemoglobin A1C; Future  -     Lipid Panel; Future  -     Microalbumin / Creatinine Urine Ratio; Future  5. Hypercholesteremia  -     atorvastatin (LIPITOR) 40 MG tablet; Take 1 tablet by mouth daily, Disp-90 tablet, R-1Normal      Results  Testing  Nerve fiber test showed no problems.    Assessment & Plan  1. Foot numbness.  Despite various interventions, his foot numbness persists. He has been advised to consider trying a supplement for nerve health and to inform the provider about its effectiveness. He was also informed that the medications tried so far have not been effective in alleviating his symptoms.    2. Chest pain.  He reports tightness in his shoulder and chest pain, which may be related to stress. A stress test will be ordered to evaluate his heart's electrical activity and blood flow. Additionally, a blood flow test will be conducted to ensure adequate oxygen supply to his heart and legs.    3. Elevated blood pressure.  His blood pressure is elevated today. He has discontinued his losartan medication. He was advised to resume losartan, and a prescription will be sent to the [facility] on [address].        Return for follow-up blood pressure and sugars with labs 4-6

## 2024-11-08 NOTE — PATIENT INSTRUCTIONS
You should purchase a blood pressure cuff to check your blood pressure at home.    Check first thing in the morning.    You should be relaxed, seated with back supported, feet flat on the floor, arm with cuff resting at heart height.    You should check your blood pressure 1-3 times.    Please write down your blood pressures and bring this record and your blood pressure cuff/machine to your follow-up visit.     I would like for your blood pressure to be less than 130 for the top number and less than 90 for the bottom number.   Please follow-up sooner for consistently high blood pressure readings at home.

## 2024-11-09 LAB
ALBUMIN SERPL-MCNC: 4.3 G/DL (ref 3.9–4.9)
ALBUMIN/CREAT UR: 3 MG/G CREAT (ref 0–29)
ALP SERPL-CCNC: 126 IU/L (ref 44–121)
ALT SERPL-CCNC: 13 IU/L (ref 0–44)
AST SERPL-CCNC: 19 IU/L (ref 0–40)
BILIRUB SERPL-MCNC: 0.4 MG/DL (ref 0–1.2)
BUN SERPL-MCNC: 12 MG/DL (ref 8–27)
BUN/CREAT SERPL: 12 (ref 10–24)
CALCIUM SERPL-MCNC: 9.5 MG/DL (ref 8.6–10.2)
CHLORIDE SERPL-SCNC: 104 MMOL/L (ref 96–106)
CO2 SERPL-SCNC: 24 MMOL/L (ref 20–29)
CREAT SERPL-MCNC: 1 MG/DL (ref 0.76–1.27)
CREAT UR-MCNC: 150 MG/DL
EGFRCR SERPLBLD CKD-EPI 2021: 84 ML/MIN/1.73
ERYTHROCYTE [DISTWIDTH] IN BLOOD BY AUTOMATED COUNT: 13.4 % (ref 11.6–15.4)
GLOBULIN SER CALC-MCNC: 2.2 G/DL (ref 1.5–4.5)
GLUCOSE SERPL-MCNC: 112 MG/DL (ref 70–99)
HBA1C MFR BLD: 6.2 % (ref 4.8–5.6)
HCT VFR BLD AUTO: 42.6 % (ref 37.5–51)
HGB BLD-MCNC: 13.9 G/DL (ref 13–17.7)
MCH RBC QN AUTO: 29.6 PG (ref 26.6–33)
MCHC RBC AUTO-ENTMCNC: 32.6 G/DL (ref 31.5–35.7)
MCV RBC AUTO: 91 FL (ref 79–97)
MICROALBUMIN UR-MCNC: 4.8 UG/ML
PLATELET # BLD AUTO: 267 X10E3/UL (ref 150–450)
POTASSIUM SERPL-SCNC: 4.2 MMOL/L (ref 3.5–5.2)
PROT SERPL-MCNC: 6.5 G/DL (ref 6–8.5)
RBC # BLD AUTO: 4.69 X10E6/UL (ref 4.14–5.8)
SODIUM SERPL-SCNC: 144 MMOL/L (ref 134–144)
TSH SERPL DL<=0.005 MIU/L-ACNC: 2.74 UIU/ML (ref 0.45–4.5)
WBC # BLD AUTO: 6 X10E3/UL (ref 3.4–10.8)

## 2024-11-10 LAB
CHOLEST SERPL-MCNC: 162 MG/DL (ref 100–199)
HDLC SERPL-MCNC: 62 MG/DL
IMP & REVIEW OF LAB RESULTS: NORMAL
LDLC SERPL CALC-MCNC: 87 MG/DL (ref 0–99)
Lab: NORMAL
TRIGL SERPL-MCNC: 64 MG/DL (ref 0–149)
VLDLC SERPL CALC-MCNC: 13 MG/DL (ref 5–40)

## 2024-11-12 ENCOUNTER — TRANSCRIBE ORDERS (OUTPATIENT)
Facility: HOSPITAL | Age: 64
End: 2024-11-12

## 2024-11-12 DIAGNOSIS — M54.16 PAIN, RADICULAR, LUMBAR: Primary | ICD-10-CM

## 2024-11-12 DIAGNOSIS — M47.817 FACET ARTHROPATHY, LUMBOSACRAL: ICD-10-CM

## 2024-11-18 ENCOUNTER — HOSPITAL ENCOUNTER (OUTPATIENT)
Facility: HOSPITAL | Age: 64
Discharge: HOME | End: 2024-11-21
Attending: ORTHOPAEDIC SURGERY
Payer: COMMERCIAL

## 2024-11-18 DIAGNOSIS — M54.16 PAIN, RADICULAR, LUMBAR: ICD-10-CM

## 2024-11-18 DIAGNOSIS — M47.817 FACET ARTHROPATHY, LUMBOSACRAL: ICD-10-CM

## 2024-11-18 PROCEDURE — 72148 MRI LUMBAR SPINE W/O DYE: CPT

## 2024-11-29 ENCOUNTER — HOSPITAL ENCOUNTER (OUTPATIENT)
Facility: HOSPITAL | Age: 64
Discharge: HOME | End: 2024-12-01
Attending: STUDENT IN AN ORGANIZED HEALTH CARE EDUCATION/TRAINING PROGRAM
Payer: COMMERCIAL

## 2024-11-29 VITALS
WEIGHT: 151 LBS | DIASTOLIC BLOOD PRESSURE: 94 MMHG | HEART RATE: 93 BPM | SYSTOLIC BLOOD PRESSURE: 130 MMHG | BODY MASS INDEX: 24.27 KG/M2 | HEIGHT: 66 IN

## 2024-11-29 DIAGNOSIS — M79.605 PAIN IN BOTH LOWER EXTREMITIES: ICD-10-CM

## 2024-11-29 DIAGNOSIS — R07.9 CHEST PAIN, UNSPECIFIED TYPE: ICD-10-CM

## 2024-11-29 DIAGNOSIS — M79.604 PAIN IN BOTH LOWER EXTREMITIES: ICD-10-CM

## 2024-11-29 LAB
ECHO BSA: 1.79 M2
ECHO BSA: 1.79 M2
STRESS BASELINE DIAS BP: 94 MMHG
STRESS BASELINE HR: 103 BPM
STRESS BASELINE SYS BP: 130 MMHG
STRESS ESTIMATED WORKLOAD: 7 METS
STRESS EXERCISE DUR MIN: 5 MIN
STRESS EXERCISE DUR SEC: 1 SEC
STRESS ST DEPRESSION: 1 MM
STRESS STAGE 1 DURATION: 3 MIN:SEC
STRESS STAGE 1 HR: 131 BPM
STRESS STAGE 2 BP: NORMAL MMHG
STRESS STAGE 2 COMMENTS: NORMAL
STRESS STAGE 2 DURATION: NORMAL MIN:SEC
STRESS STAGE 2 HR: 115 BPM
STRESS STAGE RECOVERY 1 DURATION: 1 MIN:SEC
STRESS STAGE RECOVERY 1 HR: 129 BPM
STRESS STAGE RECOVERY 2 BP: NORMAL MMHG
STRESS STAGE RECOVERY 2 DURATION: 2 MIN:SEC
STRESS STAGE RECOVERY 2 HR: 115 BPM
STRESS STAGE RECOVERY 3 BP: NORMAL MMHG
STRESS STAGE RECOVERY 3 DURATION: 3 MIN:SEC
STRESS STAGE RECOVERY 3 HR: 108 BPM
STRESS TARGET HR: 155 BPM
VAS LEFT ABI: 1.28
VAS LEFT ARM BP: 137 MMHG
VAS LEFT DORSALIS PEDIS BP: 163 MMHG
VAS LEFT PTA BP: 176 MMHG
VAS LEFT TBI: 1.04
VAS LEFT TOE PRESSURE: 143 MMHG
VAS RIGHT ABI: 1.2
VAS RIGHT ARM BP: 132 MMHG
VAS RIGHT DORSALIS PEDIS BP: 161 MMHG
VAS RIGHT PTA BP: 165 MMHG
VAS RIGHT TBI: 1.12
VAS RIGHT TOE PRESSURE: 153 MMHG

## 2024-11-29 PROCEDURE — 93923 UPR/LXTR ART STDY 3+ LVLS: CPT

## 2024-11-29 PROCEDURE — 93017 CV STRESS TEST TRACING ONLY: CPT

## 2024-12-02 ENCOUNTER — PATIENT MESSAGE (OUTPATIENT)
Facility: CLINIC | Age: 64
End: 2024-12-02

## 2024-12-02 DIAGNOSIS — R07.9 CHEST PAIN, UNSPECIFIED TYPE: ICD-10-CM

## 2024-12-02 DIAGNOSIS — R94.39 ABNORMAL STRESS ECG: Primary | ICD-10-CM

## 2024-12-04 ENCOUNTER — TELEPHONE (OUTPATIENT)
Facility: CLINIC | Age: 64
End: 2024-12-04

## 2024-12-04 NOTE — TELEPHONE ENCOUNTER
Spoke to patient , confirmed information and apt for Cardio. Directed him to discontinue and call 911 if pain persist and doesn't stop with rest.

## 2024-12-04 NOTE — TELEPHONE ENCOUNTER
Please call patient to review unread message, he has already made an appointment with cardiology for 12/26

## 2024-12-26 ENCOUNTER — OFFICE VISIT (OUTPATIENT)
Age: 64
End: 2024-12-26
Payer: COMMERCIAL

## 2024-12-26 VITALS
SYSTOLIC BLOOD PRESSURE: 160 MMHG | BODY MASS INDEX: 24.11 KG/M2 | DIASTOLIC BLOOD PRESSURE: 92 MMHG | HEIGHT: 66 IN | HEART RATE: 90 BPM | WEIGHT: 150 LBS | OXYGEN SATURATION: 94 %

## 2024-12-26 DIAGNOSIS — I10 PRIMARY HYPERTENSION: ICD-10-CM

## 2024-12-26 DIAGNOSIS — R07.9 CHEST PAIN, UNSPECIFIED TYPE: Primary | ICD-10-CM

## 2024-12-26 DIAGNOSIS — R07.9 CHEST PAIN, UNSPECIFIED TYPE: ICD-10-CM

## 2024-12-26 DIAGNOSIS — E78.2 MIXED DYSLIPIDEMIA: ICD-10-CM

## 2024-12-26 PROCEDURE — 3077F SYST BP >= 140 MM HG: CPT | Performed by: SPECIALIST

## 2024-12-26 PROCEDURE — 93000 ELECTROCARDIOGRAM COMPLETE: CPT | Performed by: SPECIALIST

## 2024-12-26 PROCEDURE — 1123F ACP DISCUSS/DSCN MKR DOCD: CPT | Performed by: SPECIALIST

## 2024-12-26 PROCEDURE — 3080F DIAST BP >= 90 MM HG: CPT | Performed by: SPECIALIST

## 2024-12-26 PROCEDURE — 99204 OFFICE O/P NEW MOD 45 MIN: CPT | Performed by: SPECIALIST

## 2024-12-26 RX ORDER — ASPIRIN 81 MG/1
81 TABLET ORAL DAILY
Qty: 90 TABLET | Refills: 0 | Status: SHIPPED | OUTPATIENT
Start: 2024-12-26

## 2024-12-26 RX ORDER — CARVEDILOL 6.25 MG/1
6.25 TABLET ORAL 2 TIMES DAILY
Qty: 60 TABLET | Refills: 12 | Status: SHIPPED | OUTPATIENT
Start: 2024-12-26

## 2024-12-26 NOTE — PROGRESS NOTES
Chief Complaint   Patient presents with    Hypertension    Sleep Apnea     Vitals:    12/26/24 1356   BP: (!) 160/92   Site: Left Upper Arm   Position: Sitting   Pulse: 90   SpO2: 94%   Weight: 68 kg (150 lb)   Height: 1.676 m (5' 6\")       Chest pain: DENIED     Recent hospital stays: DENIED     Refills: DENIED

## 2024-12-26 NOTE — PROGRESS NOTES
Jessica Kennedy MD. [facility]          Patient: Jared Boucher  : 1960      Today's Date: 2024        HISTORY OF PRESENT ILLNESS:     History of Present Illness:    Referred for abnormal stress test.   He has chronic chest pain - atypical - sometimes with heavy lifting.  Denies Sig SOB.   Shoulder always is stiff.       PAST MEDICAL HISTORY:     Past Medical History:   Diagnosis Date    Chronic back pain     Dyslipidemia     Hypertension     Prostate cancer (HCC)     Sleep apnea     STOPPED USING CPAP       Past Surgical History:   Procedure Laterality Date    GI      COLONOSCOPY    PROSTATECTOMY      PROSTATE BIOPSY             CURRENT MEDICATIONS:    .  Current Outpatient Medications   Medication Sig Dispense Refill    losartan (COZAAR) 50 MG tablet Take 1 tablet by mouth daily 90 tablet 1    amLODIPine (NORVASC) 10 MG tablet Take 1 tablet by mouth daily 90 tablet 1    atorvastatin (LIPITOR) 40 MG tablet Take 1 tablet by mouth daily 90 tablet 1    Alpha-Lipoic Acid 600 MG CAPS Take by mouth      Multiple Vitamin (ONE-A-DAY MENS PO) Take by mouth       No current facility-administered medications for this visit.       No Known Allergies      SOCIAL HISTORY:     Social History     Tobacco Use    Smoking status: Never    Smokeless tobacco: Never   Substance Use Topics    Alcohol use: Yes    Drug use: No         FAMILY HISTORY:     Family History   Problem Relation Age of Onset    Hypertension Mother     Diabetes Mother     Anesth Problems Neg Hx          REVIEW OF SYMPTOMS:     Review of Symptoms:  Constitutional: Negative for fever, chills  HEENT: Negative for nosebleeds, tinnitus, and vision changes.   Respiratory: Negative for cough, wheezing  Cardiovascular: Negative for orthopnea, claudication, syncope  Gastrointestinal: Negative for abdominal pain, diarrhea, melena.   Genitourinary: Negative for dysuria  Musculoskeletal: + joint pain, back pain   Skin: Negative for rash  Heme: No problems

## 2024-12-26 NOTE — PATIENT INSTRUCTIONS
herring, or sardines.  Eat moderate amounts of low-fat dairy products, such as milk, cheese, or yogurt.  Eat moderate amounts of poultry and eggs.  Choose healthy (unsaturated) fats, such as nuts, olive oil, and certain nut or seed oils like canola, soybean, and flaxseed.  Limit unhealthy (saturated) fats, such as butter, palm oil, and coconut oil. And limit fats found in animal products, such as meat and dairy products made with whole milk. Try to eat red meat only a few times a month in very small amounts.  Limit sweets and desserts to only a few times a week. This includes sugar-sweetened drinks like soda.  The Mediterranean diet may also include red wine with your meal--1 glass each day for women and up to 2 glasses a day for men.  Tips for eating at home  Use herbs, spices, garlic, lemon zest, and citrus juice instead of salt to add flavor to foods.  Add avocado slices to your sandwich instead of rich.  Have fish for lunch or dinner instead of red meat. Brush the fish with olive oil, and broil or grill it.  Sprinkle your salad with seeds or nuts instead of cheese.  Cook with olive or canola oil instead of butter or oils that are high in saturated fat.  Switch from 2% milk or whole milk to 1% or fat-free milk.  Dip raw vegetables in a vinaigrette dressing or hummus instead of dips made from mayonnaise or sour cream.  Have a piece of fruit for dessert instead of a piece of cake. Try baked apples, or have some dried fruit.  Tips for eating out  Try broiled, grilled, baked, or poached fish instead of having it fried or breaded.  Ask your  to have your meals prepared with olive oil instead of butter.  Order dishes made with marinara sauce or sauces made from olive oil. Avoid sauces made from cream or mayonnaise.  Choose whole-grain breads, whole wheat pasta and pizza crust, brown rice, beans, and lentils.  Cut back on butter or margarine on bread. Instead, you can dip your bread in a small amount of olive

## 2024-12-26 NOTE — H&P (VIEW-ONLY)
bleeding.  Neurological: Negative for speech change and focal weakness.       PHYSICAL EXAM:     Physical Exam:  BP (!) 160/92 (Site: Left Upper Arm, Position: Sitting)   Pulse 90   Ht 1.676 m (5' 6\")   Wt 68 kg (150 lb)   SpO2 94%   BMI 24.21 kg/m²     Patient appears generally well, mood and affect are appropriate and pleasant.  HEENT:  Hearing intact, non-icteric, normocephalic, atraumatic.   Neck Exam: Supple, No JVD   Lung Exam: Clear to auscultation, even breath sounds.   Cardiac Exam: Regular rate and rhythm with 2/6 systolic murmur   Abdomen: Soft, non-tender  Extremities: Moves all ext well. No lower extremity edema.  MSKTL: Overall good ROM ext  Skin: No significant rashes  Psych: Appropriate affect  Neuro - Grossly intact        LABS / OTHER STUDIES:     Lab Results   Component Value Date     11/08/2024    K 4.2 11/08/2024     11/08/2024    CO2 24 11/08/2024    BUN 12 11/08/2024    CREATININE 1.00 11/08/2024    GLUCOSE 112 (H) 11/08/2024    CALCIUM 9.5 11/08/2024    BILITOT 0.4 11/08/2024    ALKPHOS 126 (H) 11/08/2024    AST 19 11/08/2024    ALT 13 11/08/2024    LABGLOM 84 11/08/2024    GFRAA 91 01/06/2022    AGRATIO 2.1 03/06/2024    GLOB 3.0 11/14/2019       Lab Results   Component Value Date    WBC 6.0 11/08/2024    HGB 13.9 11/08/2024    HCT 42.6 11/08/2024    MCV 91 11/08/2024     11/08/2024     Lab Results   Component Value Date    TSH 2.740 11/08/2024     Lab Results   Component Value Date    CHOL 162 11/08/2024    TRIG 64 11/08/2024    HDL 62 11/08/2024    LDL 87 11/08/2024    VLDL 13 11/08/2024           CARDIAC DIAGNOSTICS:     Cardiac Evaluation Includes:  I reviewed the test results below.    EKG 12/26/24 - NSR, LVH with repol changes       Treadmill Stress Test 11/29/24 - walked 5 min; abnormal stress EKG with 1 mm ST depression and typical CP walking     KEVIN's 11/29/24 - normal bilat         ASSESSMENT AND PLAN:     Assessment and Plan:    Chest pain   - has chest

## 2024-12-31 ENCOUNTER — TELEPHONE (OUTPATIENT)
Age: 64
End: 2024-12-31

## 2024-12-31 NOTE — TELEPHONE ENCOUNTER
Spoke with patient to schedule LHC per Dr. Kennedy. Patient was very confused. Explained to the patient that he was currently scheduled in March for an echo and follow up with the Np and that I was calling to schedule his catherization. Explained to the patient that he was also given information while he was in the office about this. Patient was not understanding. Passed info back over to Ayana Edmondson as the patient did not wish to schedule at this time.

## 2025-01-03 ENCOUNTER — TELEPHONE (OUTPATIENT)
Age: 65
End: 2025-01-03

## 2025-01-06 ENCOUNTER — TELEPHONE (OUTPATIENT)
Age: 65
End: 2025-01-06

## 2025-01-06 DIAGNOSIS — R94.39 ABNORMAL STRESS TEST: Primary | ICD-10-CM

## 2025-01-06 DIAGNOSIS — R07.9 CHEST PAIN, UNSPECIFIED TYPE: ICD-10-CM

## 2025-01-06 NOTE — TELEPHONE ENCOUNTER
Spoke with Pt regarding [facility] schd. For 1/23/2025 At 9:15 AM arrive at 7:45 AM. Pt aware that they need a  they must stay on site NPO from Midnight the night before. You can have clear liquids up to 2 hours prior to procedure. Please drink 10 8oz glasses of water 3 days prior and 3 days after Cath. Check in at the second floor Outpt. Reg. Desk. Pt is to have Labs done by 1/16/2025.     Medications:  Okay to take     VO by /nurse: Ayana ÁLVAREZ

## 2025-01-06 NOTE — TELEPHONE ENCOUNTER
R/t call to pt,    Reviewed procedure and upcoming appointments.  He is ready to schedule cath.  Transferred call to specialty .

## 2025-01-09 ENCOUNTER — DIRECT ADMIT ORDERS (OUTPATIENT)
Age: 65
End: 2025-01-09

## 2025-01-09 DIAGNOSIS — R94.39 ABNORMAL STRESS TEST: Primary | ICD-10-CM

## 2025-01-09 DIAGNOSIS — R07.9 CHEST PAIN, UNSPECIFIED TYPE: ICD-10-CM

## 2025-01-09 RX ORDER — SODIUM CHLORIDE 9 MG/ML
INJECTION, SOLUTION INTRAVENOUS PRN
OUTPATIENT
Start: 2025-01-23

## 2025-01-09 RX ORDER — SODIUM CHLORIDE 0.9 % (FLUSH) 0.9 %
5-40 SYRINGE (ML) INJECTION PRN
OUTPATIENT
Start: 2025-01-23

## 2025-01-09 RX ORDER — SODIUM CHLORIDE 0.9 % (FLUSH) 0.9 %
5-40 SYRINGE (ML) INJECTION EVERY 12 HOURS SCHEDULED
OUTPATIENT
Start: 2025-01-23 | End: 2025-01-23

## 2025-01-15 ENCOUNTER — TELEPHONE (OUTPATIENT)
Age: 65
End: 2025-01-15

## 2025-01-15 NOTE — TELEPHONE ENCOUNTER
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  Pt requesting to be off the whole next week after cath.  Discussed that will request info from Dr. Kennedy, however we typically do not allow that much time off for medical leave unless there are complications, which will be discussed after the cath.    Pt will drop paperwork off to [employer] tomorrow.

## 2025-01-15 NOTE — TELEPHONE ENCOUNTER
Patient called and would like a call back from nurse because he would like to discuss some paperwork that he has from his job that he needs to have filled out from Dr. Kennedy.     Patient phone # [phone]

## 2025-01-17 ENCOUNTER — TELEPHONE (OUTPATIENT)
Age: 65
End: 2025-01-17

## 2025-01-17 NOTE — TELEPHONE ENCOUNTER
Paperwork received and reviewed.      R/t call to pt.    He dropped off [facility] paperwork to be filled out for cath on 1/23/25.    MILI

## 2025-01-17 NOTE — TELEPHONE ENCOUNTER
Called patient to remind of upcoming cath and reminder to have labs drawn as soon as possible. During call patient asked to speak with Ayana regarding some paperwork he dropped off for Dr. Kennedy to fill out.     Patient can be reached at [phone]

## 2025-01-20 NOTE — TELEPHONE ENCOUNTER
R/t call to pt,    Discussed that I feel he has dropped off inappropriate paperwork for his case.    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      Faxed [facility] paperwork to provided HR department.

## 2025-01-20 NOTE — TELEPHONE ENCOUNTER
Patient would like a return call to discuss paperwork that was dropped off to be filled out.     Phone # [phone]

## 2025-01-21 LAB
BUN SERPL-MCNC: 14 MG/DL (ref 8–27)
BUN/CREAT SERPL: 12 (ref 10–24)
CALCIUM SERPL-MCNC: 9.6 MG/DL (ref 8.6–10.2)
CHLORIDE SERPL-SCNC: 103 MMOL/L (ref 96–106)
CO2 SERPL-SCNC: 27 MMOL/L (ref 20–29)
CREAT SERPL-MCNC: 1.17 MG/DL (ref 0.76–1.27)
EGFRCR SERPLBLD CKD-EPI 2021: 69 ML/MIN/1.73
ERYTHROCYTE [DISTWIDTH] IN BLOOD BY AUTOMATED COUNT: 13.1 % (ref 11.6–15.4)
GLUCOSE SERPL-MCNC: 121 MG/DL (ref 70–99)
HCT VFR BLD AUTO: 46.3 % (ref 37.5–51)
HGB BLD-MCNC: 15 G/DL (ref 13–17.7)
MCH RBC QN AUTO: 29.6 PG (ref 26.6–33)
MCHC RBC AUTO-ENTMCNC: 32.4 G/DL (ref 31.5–35.7)
MCV RBC AUTO: 91 FL (ref 79–97)
PLATELET # BLD AUTO: 252 X10E3/UL (ref 150–450)
POTASSIUM SERPL-SCNC: 4.7 MMOL/L (ref 3.5–5.2)
RBC # BLD AUTO: 5.07 X10E6/UL (ref 4.14–5.8)
SODIUM SERPL-SCNC: 141 MMOL/L (ref 134–144)
WBC # BLD AUTO: 3.5 X10E3/UL (ref 3.4–10.8)

## 2025-01-23 ENCOUNTER — HOSPITAL ENCOUNTER (OUTPATIENT)
Facility: HOSPITAL | Age: 65
Setting detail: OUTPATIENT SURGERY
Discharge: HOME | End: 2025-01-23
Attending: STUDENT IN AN ORGANIZED HEALTH CARE EDUCATION/TRAINING PROGRAM | Admitting: STUDENT IN AN ORGANIZED HEALTH CARE EDUCATION/TRAINING PROGRAM
Payer: COMMERCIAL

## 2025-01-23 VITALS
RESPIRATION RATE: 17 BRPM | HEIGHT: 66 IN | HEART RATE: 75 BPM | SYSTOLIC BLOOD PRESSURE: 136 MMHG | DIASTOLIC BLOOD PRESSURE: 84 MMHG | TEMPERATURE: 99.1 F | OXYGEN SATURATION: 100 % | WEIGHT: 150 LBS | BODY MASS INDEX: 24.11 KG/M2

## 2025-01-23 DIAGNOSIS — R07.9 CHEST PAIN, UNSPECIFIED: ICD-10-CM

## 2025-01-23 DIAGNOSIS — R94.39 ABNORMAL STRESS TEST: ICD-10-CM

## 2025-01-23 DIAGNOSIS — R07.9 CHEST PAIN, UNSPECIFIED TYPE: ICD-10-CM

## 2025-01-23 LAB — ECHO BSA: 1.78 M2

## 2025-01-23 PROCEDURE — 76937 US GUIDE VASCULAR ACCESS: CPT | Performed by: STUDENT IN AN ORGANIZED HEALTH CARE EDUCATION/TRAINING PROGRAM

## 2025-01-23 PROCEDURE — 2500000003 HC RX 250 WO HCPCS: Performed by: STUDENT IN AN ORGANIZED HEALTH CARE EDUCATION/TRAINING PROGRAM

## 2025-01-23 PROCEDURE — C1769 GUIDE WIRE: HCPCS | Performed by: STUDENT IN AN ORGANIZED HEALTH CARE EDUCATION/TRAINING PROGRAM

## 2025-01-23 PROCEDURE — 6360000004 HC RX CONTRAST MEDICATION: Performed by: STUDENT IN AN ORGANIZED HEALTH CARE EDUCATION/TRAINING PROGRAM

## 2025-01-23 PROCEDURE — 6360000002 HC RX W HCPCS: Performed by: STUDENT IN AN ORGANIZED HEALTH CARE EDUCATION/TRAINING PROGRAM

## 2025-01-23 PROCEDURE — C1894 INTRO/SHEATH, NON-LASER: HCPCS | Performed by: STUDENT IN AN ORGANIZED HEALTH CARE EDUCATION/TRAINING PROGRAM

## 2025-01-23 PROCEDURE — C1887 CATHETER, GUIDING: HCPCS | Performed by: STUDENT IN AN ORGANIZED HEALTH CARE EDUCATION/TRAINING PROGRAM

## 2025-01-23 PROCEDURE — 6370000000 HC RX 637 (ALT 250 FOR IP): Performed by: STUDENT IN AN ORGANIZED HEALTH CARE EDUCATION/TRAINING PROGRAM

## 2025-01-23 PROCEDURE — 99152 MOD SED SAME PHYS/QHP 5/>YRS: CPT | Performed by: STUDENT IN AN ORGANIZED HEALTH CARE EDUCATION/TRAINING PROGRAM

## 2025-01-23 PROCEDURE — 2709999900 HC NON-CHARGEABLE SUPPLY: Performed by: STUDENT IN AN ORGANIZED HEALTH CARE EDUCATION/TRAINING PROGRAM

## 2025-01-23 PROCEDURE — 93458 L HRT ARTERY/VENTRICLE ANGIO: CPT | Performed by: STUDENT IN AN ORGANIZED HEALTH CARE EDUCATION/TRAINING PROGRAM

## 2025-01-23 RX ORDER — SODIUM CHLORIDE 0.9 % (FLUSH) 0.9 %
5-40 SYRINGE (ML) INJECTION EVERY 12 HOURS SCHEDULED
Status: DISCONTINUED | OUTPATIENT
Start: 2025-01-23 | End: 2025-01-23 | Stop reason: HOSPADM

## 2025-01-23 RX ORDER — HEPARIN SODIUM 1000 [USP'U]/ML
INJECTION, SOLUTION INTRAVENOUS; SUBCUTANEOUS PRN
Status: DISCONTINUED | OUTPATIENT
Start: 2025-01-23 | End: 2025-01-23 | Stop reason: HOSPADM

## 2025-01-23 RX ORDER — VERAPAMIL HYDROCHLORIDE 2.5 MG/ML
INJECTION, SOLUTION INTRAVENOUS PRN
Status: DISCONTINUED | OUTPATIENT
Start: 2025-01-23 | End: 2025-01-23 | Stop reason: HOSPADM

## 2025-01-23 RX ORDER — FENTANYL CITRATE 50 UG/ML
INJECTION, SOLUTION INTRAMUSCULAR; INTRAVENOUS PRN
Status: DISCONTINUED | OUTPATIENT
Start: 2025-01-23 | End: 2025-01-23 | Stop reason: HOSPADM

## 2025-01-23 RX ORDER — SODIUM CHLORIDE 9 MG/ML
INJECTION, SOLUTION INTRAVENOUS PRN
Status: DISCONTINUED | OUTPATIENT
Start: 2025-01-23 | End: 2025-01-23 | Stop reason: HOSPADM

## 2025-01-23 RX ORDER — ACETAMINOPHEN 325 MG/1
650 TABLET ORAL EVERY 4 HOURS PRN
Status: DISCONTINUED | OUTPATIENT
Start: 2025-01-23 | End: 2025-01-23 | Stop reason: HOSPADM

## 2025-01-23 RX ORDER — SODIUM CHLORIDE 0.9 % (FLUSH) 0.9 %
5-40 SYRINGE (ML) INJECTION PRN
Status: DISCONTINUED | OUTPATIENT
Start: 2025-01-23 | End: 2025-01-23 | Stop reason: HOSPADM

## 2025-01-23 RX ORDER — HEPARIN SODIUM 200 [USP'U]/100ML
INJECTION, SOLUTION INTRAVENOUS PRN
Status: DISCONTINUED | OUTPATIENT
Start: 2025-01-23 | End: 2025-01-23 | Stop reason: HOSPADM

## 2025-01-23 RX ORDER — IOPAMIDOL 755 MG/ML
INJECTION, SOLUTION INTRAVASCULAR PRN
Status: DISCONTINUED | OUTPATIENT
Start: 2025-01-23 | End: 2025-01-23 | Stop reason: HOSPADM

## 2025-01-23 RX ORDER — MIDAZOLAM HYDROCHLORIDE 1 MG/ML
INJECTION, SOLUTION INTRAMUSCULAR; INTRAVENOUS PRN
Status: DISCONTINUED | OUTPATIENT
Start: 2025-01-23 | End: 2025-01-23 | Stop reason: HOSPADM

## 2025-01-23 RX ORDER — LIDOCAINE HYDROCHLORIDE 10 MG/ML
INJECTION, SOLUTION INFILTRATION; PERINEURAL PRN
Status: DISCONTINUED | OUTPATIENT
Start: 2025-01-23 | End: 2025-01-23 | Stop reason: HOSPADM

## 2025-01-23 RX ADMIN — ACETAMINOPHEN 650 MG: 325 TABLET ORAL at 13:19

## 2025-01-23 ASSESSMENT — PAIN DESCRIPTION - DESCRIPTORS: DESCRIPTORS: ACHING;GNAWING

## 2025-01-23 ASSESSMENT — PAIN DESCRIPTION - LOCATION: LOCATION: ARM

## 2025-01-23 ASSESSMENT — PAIN DESCRIPTION - ORIENTATION: ORIENTATION: RIGHT

## 2025-01-23 ASSESSMENT — PAIN SCALES - GENERAL: PAINLEVEL_OUTOF10: 5

## 2025-01-23 NOTE — INTERVAL H&P NOTE
Update History & Physical    History and physical has been reviewed. The patient has been examined. There have been no significant clinical changes since the completion of the originally dated History and Physical.    Risk and benefit of cardiac catheterization/PCI was described in detail to patient.  (risk of vascular access complication with potential surgical intervention for management, stroke, myocardial infarction, emergent open heart surgery and death).  Patient wishes to proceed with coronary angiography with possible intervention.     Labs   Lab Results   Component Value Date/Time     01/20/2025 12:06 PM    K 4.7 01/20/2025 12:06 PM     01/20/2025 12:06 PM    CO2 27 01/20/2025 12:06 PM    BUN 14 01/20/2025 12:06 PM    CREATININE 1.17 01/20/2025 12:06 PM    GLUCOSE 121 01/20/2025 12:06 PM    CALCIUM 9.6 01/20/2025 12:06 PM    LABGLOM 69 01/20/2025 12:06 PM    LABGLOM 79 03/06/2024 10:09 AM      Lab Results   Component Value Date    WBC 3.5 01/20/2025    HGB 15.0 01/20/2025    HCT 46.3 01/20/2025    MCV 91 01/20/2025     01/20/2025 11/29/24    STRESS TEST ONLY EXERCISE 11/29/2024  2:01 PM (Final)    Interpretation Summary    ECG: The stress ECG was positive for ischemia.    Stress Test: A Chance protocol stress test was performed. The patient was stressed for 5 min and 1 sec.    Signed by: Loy Muniz MD on 11/29/2024  2:01 PM      ASA 3  Airway 3      Plan: The risks, benefits, expected outcome, and alternative to the recommended procedure have been discussed with the patient. Patient understands and wants to proceed with the procedure.     Electronically signed by Patricio Newton DO on 1/23/2025 at 9:02 AM

## 2025-01-23 NOTE — PROGRESS NOTES
1/23/2025      RE: Jared Boucher         [id number] [place] [address]          To Whom It May Concern,      Due to medical reasons and post-procedure restrictions, Jared Boucher  should remain out of work until 1/31        Sincerely,          Neela Anderson RN   
dopplerable.    1315:  Patient ambulates in hallway or on unit.  Pulses dopplerable.    1330:  Pt discharged via wheelchair with Sister Betty. Personal belongings with patient upon discharge. Radial Site and forearm remain soft.

## 2025-01-23 NOTE — PROCEDURES
PROCEDURE NOTE  Date: 1/23/2025   Name: Jared Boucher  YOB: 1960    Procedures        BRIEF PROCEDURE NOTE    Date of Procedure: 1/23/2025   Preoperative Diagnosis: abnormal stress test  Postoperative Diagnosis: cad    Procedure: Left heart cath, coronary angiography, moderate sedation  Interventional Cardiologist: Patricio Newton DO  Assistant: None  Anesthesia: local + IV moderate sedation   I administered moderate sedation throughout this procedure. An independent trained observer pushed medications at my direction, and monitored the patient’s level of consciousness and physiological status throughout.  Estimated Blood Loss: Minimal    Access: right radial artery, 6F.  Small radial artery.  Mild forearm swelling post-case  Catheters:  Left coronary:JL 3.5, 5f  Right coronary: JR 4, 5f    Findings:   L Main:large caliber, minimal disease  LAD:large caliber, moderate diagonal, diffuse moderate disease of proximal and mid-LAD with focal moderate to severe disease of diagonal  LCx:l moderate caliber, moderate om1 with dffuse moderate disease, small av groove lcx with distal branching  RCA: moderate  caliber, small pda, mild disease    Unable to pass neither 6F nor 5F guide for pressure assessment of moderate to severe LAD disease    LVEDP:  12 mmhg        Specimens Removed: None    Implants: n/a    Closure Device: radial TR band    See full cath note.    Complications: none      Findings:  1. Moderate to severe disease of proximal to mid-lad.  Unable to pass guide via radial access to assess iFR of LAD  2. Diffuse moderate to severe disease of om1    Plan:  [facility] medical mgmt    Consider CT FFR for hemodynamic assessment of LAD      DO Patricio Pierre DO  [address], [address]  [place] [address]                                              Office [phone],Fax [phone]

## 2025-01-28 ENCOUNTER — TELEPHONE (OUTPATIENT)
Age: 65
End: 2025-01-28

## 2025-01-28 NOTE — TELEPHONE ENCOUNTER
Called pt,    Per Dr. Jessica Kennedy r/t Cath 1/23/25 w Dr. Newton:   \"Will plan for medical management  ---> plan for good BP control. Let's increase Coreg to 12.5 mg BID next (increase later if possible). Cont CCB, statin, ASA.     Goal BP < 130/80     If still symptomatic, then would plan for CT FFR for hemodynamic assessment of LAD.\"    CP at night while laying down/stiff?    Future Appointments   Date Time Provider Department Center   3/3/2025  8:00 AM [facility] ECHO 3 [facility] AMB   3/3/2025  9:40 AM Wilma Carrizales, APRN - NP [facility] BS AMB   4/30/2025  9:30 AM Carmen Aguirre MD [id number] [facility] ECC DEP

## 2025-01-29 RX ORDER — CARVEDILOL 12.5 MG/1
12.5 TABLET ORAL 2 TIMES DAILY
Qty: 180 TABLET | Refills: 3 | Status: SHIPPED | OUTPATIENT
Start: 2025-01-29

## 2025-01-31 ENCOUNTER — HOSPITAL ENCOUNTER (EMERGENCY)
Facility: HOSPITAL | Age: 65
Discharge: HOME | End: 2025-02-01
Attending: EMERGENCY MEDICINE
Payer: COMMERCIAL

## 2025-01-31 ENCOUNTER — HOSPITAL ENCOUNTER (EMERGENCY)
Facility: HOSPITAL | Age: 65
Discharge: ANOTHER HOSPITAL | End: 2025-01-31
Attending: EMERGENCY MEDICINE
Payer: COMMERCIAL

## 2025-01-31 ENCOUNTER — TELEPHONE (OUTPATIENT)
Age: 65
End: 2025-01-31

## 2025-01-31 ENCOUNTER — APPOINTMENT (OUTPATIENT)
Facility: HOSPITAL | Age: 65
End: 2025-01-31
Payer: COMMERCIAL

## 2025-01-31 VITALS
HEIGHT: 67 IN | WEIGHT: 150 LBS | RESPIRATION RATE: 20 BRPM | SYSTOLIC BLOOD PRESSURE: 163 MMHG | DIASTOLIC BLOOD PRESSURE: 108 MMHG | HEART RATE: 82 BPM | OXYGEN SATURATION: 98 % | TEMPERATURE: 98.2 F | BODY MASS INDEX: 23.54 KG/M2

## 2025-01-31 VITALS
OXYGEN SATURATION: 98 % | SYSTOLIC BLOOD PRESSURE: 139 MMHG | RESPIRATION RATE: 23 BRPM | DIASTOLIC BLOOD PRESSURE: 96 MMHG | TEMPERATURE: 98 F | WEIGHT: 150 LBS | HEIGHT: 67 IN | BODY MASS INDEX: 23.54 KG/M2 | HEART RATE: 85 BPM

## 2025-01-31 DIAGNOSIS — I77.76: Primary | ICD-10-CM

## 2025-01-31 DIAGNOSIS — M79.601 RIGHT ARM PAIN: Primary | ICD-10-CM

## 2025-01-31 LAB
ALBUMIN SERPL-MCNC: 4.2 G/DL (ref 3.5–5.2)
ALBUMIN/GLOB SERPL: 1.8 (ref 1.1–2.2)
ALP SERPL-CCNC: 109 U/L (ref 40–129)
ALT SERPL-CCNC: 15 U/L (ref 10–50)
ANION GAP SERPL CALC-SCNC: 9 MMOL/L (ref 2–12)
AST SERPL-CCNC: 19 U/L (ref 10–50)
BASOPHILS # BLD: 0.03 K/UL (ref 0–0.1)
BASOPHILS NFR BLD: 0.7 % (ref 0–1)
BILIRUB SERPL-MCNC: 0.4 MG/DL (ref 0.2–1)
BUN SERPL-MCNC: 11 MG/DL (ref 8–23)
BUN/CREAT SERPL: 11 (ref 12–20)
CALCIUM SERPL-MCNC: 9.3 MG/DL (ref 8.8–10.2)
CHLORIDE SERPL-SCNC: 105 MMOL/L (ref 98–107)
CO2 SERPL-SCNC: 29 MMOL/L (ref 22–29)
COMMENT:: NORMAL
CREAT SERPL-MCNC: 0.98 MG/DL (ref 0.7–1.2)
DIFFERENTIAL METHOD BLD: NORMAL
EOSINOPHIL # BLD: 0.19 K/UL (ref 0–0.4)
EOSINOPHIL NFR BLD: 4.2 % (ref 0–7)
ERYTHROCYTE [DISTWIDTH] IN BLOOD BY AUTOMATED COUNT: 13.1 % (ref 11.5–14.5)
GLOBULIN SER CALC-MCNC: 2.3 G/DL (ref 2–4)
GLUCOSE SERPL-MCNC: 111 MG/DL (ref 65–100)
HCT VFR BLD AUTO: 41.1 % (ref 36.6–50.3)
HGB BLD-MCNC: 13.8 G/DL (ref 12.1–17)
IMM GRANULOCYTES # BLD AUTO: 0.02 K/UL (ref 0–0.04)
IMM GRANULOCYTES NFR BLD AUTO: 0.4 % (ref 0–0.5)
LYMPHOCYTES # BLD: 1.49 K/UL (ref 0.8–3.5)
LYMPHOCYTES NFR BLD: 33.2 % (ref 12–49)
MCH RBC QN AUTO: 29.9 PG (ref 26–34)
MCHC RBC AUTO-ENTMCNC: 33.6 G/DL (ref 30–36.5)
MCV RBC AUTO: 89 FL (ref 80–99)
MONOCYTES # BLD: 0.38 K/UL (ref 0–1)
MONOCYTES NFR BLD: 8.5 % (ref 5–13)
NEUTS SEG # BLD: 2.38 K/UL (ref 1.8–8)
NEUTS SEG NFR BLD: 53 % (ref 32–75)
NRBC # BLD: 0 K/UL (ref 0–0.01)
NRBC BLD-RTO: 0 PER 100 WBC
PLATELET # BLD AUTO: 248 K/UL (ref 150–400)
PMV BLD AUTO: 9.3 FL (ref 8.9–12.9)
POTASSIUM SERPL-SCNC: 4.4 MMOL/L (ref 3.5–5.1)
PROT SERPL-MCNC: 6.5 G/DL (ref 6.4–8.3)
RBC # BLD AUTO: 4.62 M/UL (ref 4.1–5.7)
SODIUM SERPL-SCNC: 143 MMOL/L (ref 136–145)
SPECIMEN HOLD: NORMAL
WBC # BLD AUTO: 4.5 K/UL (ref 4.1–11.1)

## 2025-01-31 PROCEDURE — 73206 CT ANGIO UPR EXTRM W/O&W/DYE: CPT

## 2025-01-31 PROCEDURE — 99281 EMR DPT VST MAYX REQ PHY/QHP: CPT

## 2025-01-31 PROCEDURE — 6360000004 HC RX CONTRAST MEDICATION: Performed by: EMERGENCY MEDICINE

## 2025-01-31 PROCEDURE — 80053 COMPREHEN METABOLIC PANEL: CPT

## 2025-01-31 PROCEDURE — 36415 COLL VENOUS BLD VENIPUNCTURE: CPT

## 2025-01-31 PROCEDURE — 99285 EMERGENCY DEPT VISIT HI MDM: CPT

## 2025-01-31 PROCEDURE — 85025 COMPLETE CBC W/AUTO DIFF WBC: CPT

## 2025-01-31 PROCEDURE — 6370000000 HC RX 637 (ALT 250 FOR IP): Performed by: PHYSICIAN ASSISTANT

## 2025-01-31 RX ORDER — CARVEDILOL 6.25 MG/1
12.5 TABLET ORAL
Status: COMPLETED | OUTPATIENT
Start: 2025-01-31 | End: 2025-01-31

## 2025-01-31 RX ORDER — ACETAMINOPHEN 325 MG/1
650 TABLET ORAL
Status: COMPLETED | OUTPATIENT
Start: 2025-01-31 | End: 2025-01-31

## 2025-01-31 RX ORDER — IOPAMIDOL 755 MG/ML
100 INJECTION, SOLUTION INTRAVASCULAR
Status: COMPLETED | OUTPATIENT
Start: 2025-01-31 | End: 2025-01-31

## 2025-01-31 RX ADMIN — ACETAMINOPHEN 650 MG: 325 TABLET ORAL at 16:48

## 2025-01-31 RX ADMIN — IOPAMIDOL 100 ML: 755 INJECTION, SOLUTION INTRAVENOUS at 17:21

## 2025-01-31 RX ADMIN — CARVEDILOL 12.5 MG: 6.25 TABLET, FILM COATED ORAL at 22:22

## 2025-01-31 ASSESSMENT — PAIN - FUNCTIONAL ASSESSMENT
PAIN_FUNCTIONAL_ASSESSMENT: 0-10
PAIN_FUNCTIONAL_ASSESSMENT: PREVENTS OR INTERFERES WITH ALL ACTIVE AND SOME PASSIVE ACTIVITIES
PAIN_FUNCTIONAL_ASSESSMENT: 0-10

## 2025-01-31 ASSESSMENT — PAIN SCALES - GENERAL
PAINLEVEL_OUTOF10: 7
PAINLEVEL_OUTOF10: 5
PAINLEVEL_OUTOF10: 8

## 2025-01-31 ASSESSMENT — LIFESTYLE VARIABLES
HOW OFTEN DO YOU HAVE A DRINK CONTAINING ALCOHOL: 2-4 TIMES A MONTH
HOW MANY STANDARD DRINKS CONTAINING ALCOHOL DO YOU HAVE ON A TYPICAL DAY: 1 OR 2

## 2025-01-31 ASSESSMENT — PAIN DESCRIPTION - LOCATION
LOCATION: ARM
LOCATION: ARM

## 2025-01-31 ASSESSMENT — PAIN DESCRIPTION - ORIENTATION
ORIENTATION: RIGHT
ORIENTATION: RIGHT

## 2025-01-31 ASSESSMENT — PAIN DESCRIPTION - DESCRIPTORS
DESCRIPTORS: ACHING
DESCRIPTORS: STABBING;SHARP

## 2025-01-31 NOTE — TELEPHONE ENCOUNTER
Future Appointments   Date Time Provider Department Center   3/3/2025  8:00 AM [facility]   3/3/2025  9:40 AM Wilma Carrizales, APRN - NP [facility]   4/30/2025  9:30 AM Carmen Aguirre MD [id number] ECC DEP     ID verified using two patient identifiers. Patient stated that his right elbow is hurting and is an 8 out of 10. Patient denies numbness or tingling in hand or fingers. Patient denied discoloration or warmth at right elbow. Patient stated that he is taking ibuprofen. Writer advised patient to go to the hospital or urgent care to have his right arm checked out since he was in pain. Patient stated understanding and stated, \"Do you think they will give me stronger pain medication.\" Writer informed patient that they would evaluate him and determine what he needed, patient stated understanding and said that he would probably go to the [place] ER on [address].

## 2025-01-31 NOTE — ED PROVIDER NOTES
kg/m².    Physical Exam  Constitutional:       General: He is not in acute distress.  HENT:      Nose: Nose normal.   Eyes:      Extraocular Movements: Extraocular movements intact.   Cardiovascular:      Rate and Rhythm: Normal rate.      Pulses: Normal pulses.      Heart sounds: Normal heart sounds.   Pulmonary:      Effort: Pulmonary effort is normal.      Breath sounds: Normal breath sounds.   Musculoskeletal:         General: Normal range of motion.      Cervical back: Normal range of motion.      Comments: Distal UE strength and sensation intact and equal b/l   Skin:     General: Skin is warm and dry.      Comments: 9x5cm ecchymosis over right, medial proximal lower arm that is mildly tender to palpation. No increased warmth. No fluctuance. No erythema, edema, ecchymosis, or drainage from right radial access site.   Neurological:      Mental Status: He is alert. Mental status is at baseline.         DIAGNOSTIC RESULTS     EKG: All EKG's are interpreted by the Emergency Department Physician who either signs or Co-signs this chart in the absence of a cardiologist.        RADIOLOGY:   Non-plain film images such as CT, Ultrasound and MRI are read by the radiologist. Plain radiographic images are visualized and preliminarily interpreted by the emergency physician with the below findings:        Interpretation per the Radiologist below, if available at the time of this note:    CTA UPPER EXTREMITY RIGHT W WO CONTRAST   Final Result   Abnormal appearance of a small artery in the forearm, which is favored to   reflect the anterior interosseous artery. This artery is abnormally dilated in   one segment and occluded in another. Favor that these findings reflect a wire   injury from recent radial catheterization such as a dissection.       Electronically signed by Ruth Kaplan           LABS:  Labs Reviewed   COMPREHENSIVE METABOLIC PANEL - Abnormal; Notable for the following components:       Result Value    Glucose  111 (*)     BUN/Creatinine Ratio 11 (*)     All other components within normal limits   CBC WITH AUTO DIFFERENTIAL       All other labs were within normal range or not returned as of this dictation.    EMERGENCY DEPARTMENT COURSE and DIFFERENTIAL DIAGNOSIS/MDM:   Vitals:    Vitals:    01/31/25 1553 01/31/25 1554 01/31/25 2025 01/31/25 2030   BP:  (!) 132/90 (!) 153/97 (!) 208/100   Pulse: 73  77    Resp: 18  20    Temp: 98.5 °F (36.9 °C)      TempSrc: Oral      SpO2: 95%  98% 100%   Weight: 68 kg (150 lb)      Height: 1.702 m (5' 7\")              Medical Decision Making  In assessment, this patient presented for worsening right arm pain and bruising x 4 days after having an angiography on 1/23 via right radial access. Vitals are unremarkable. There is no concern for cellulitis or abscess at this time considering the lack of fever, increased warmth, or fluctuance. Considering the severity of the pain and proximal location of the bruising, there was concern for a right arm arterial dissection. A RUE CTA with contrast showed an abnormal appearance of the right anterior interosseous artery that may reflect a wire injury such as dissection.  I discussed with Dr. Cavazos of cardiology who said that this is not a common complication of angiography and recommended consulting vascular surgery. I spoke with Dr. Hobbs of vascular surgery who recommended transfer to [facility] ED so that he can evaluation the patient, especially considering the right upper extremity is involved. He said that it would be possible that the patient may be discharged from there without intervention. This was discussed with the patient and he was agreeable to transfer understanding that he may need transportation from the ED later tonight.    Patient reports he takes Coreg 12.5 mg at nighttime.  BP was 208/100.  He reported his pain was reduced to 3/10 in intensity and declined additional pain medication.  We will give his evening dose of

## 2025-01-31 NOTE — TELEPHONE ENCOUNTER
Patient called in stating that he is in a lot of pain since cath on 01/23/25 , he is requesting to please have some pain medication prescribed.  stats that the aspirin is not helping him any. Patient would please like to have an call from an nurse to be able to discuss further of what is going on .       Patient # ~ [phone]

## 2025-02-01 RX ORDER — OXYCODONE AND ACETAMINOPHEN 5; 325 MG/1; MG/1
1 TABLET ORAL EVERY 6 HOURS PRN
Qty: 12 TABLET | Refills: 0 | Status: SHIPPED | OUTPATIENT
Start: 2025-02-01 | End: 2025-02-04

## 2025-02-01 NOTE — DISCHARGE INSTRUCTIONS
You were seen in the ER by vascular surgery today.  They would like you to use Percocet for pain, and rest your arm as was discussed with you.  If your symptoms are not improving, please follow-up with vascular surgery in the [place] office next week.  Thank you.

## 2025-02-01 NOTE — CONSULTS
Vascular Surgery Consult    Subjective:     Jared Boucher is a 64 y.o. male who had a cardiac cath w/o intervention on 1/23 via Rt radial approach.  Note mentions that 5 and 6 Fr guides could not be passed.  He presented c/o Rt forearm pain.  CTA showed what was read as a ? Dissection of the anterior interosseous artery.  I have independently reviewed and interpreted the CT images, reviewed chart and seen patient.  I agree the CTA findings are likely due to a wire injury of a small branch but favor a small pseudoaneurysm over dissection.     Past Medical History:   Diagnosis Date    Chronic back pain     Dyslipidemia     Hypertension     Prostate cancer (HCC)     Sleep apnea     STOPPED USING CPAP      Past Surgical History:   Procedure Laterality Date    CARDIAC PROCEDURE N/A 1/23/2025    Left heart cath / coronary angiography performed by Patricio Newton DO at [facility] CARDIAC CATH LAB    GI      COLONOSCOPY    INVASIVE VASCULAR N/A 1/23/2025    Ultrasound guided vascular access performed by Patricio Newton DO at [facility] CARDIAC CATH LAB    PROSTATECTOMY      PROSTATE BIOPSY     Family History   Problem Relation Age of Onset    Hypertension Mother     Diabetes Mother     Anesth Problems Neg Hx       Social History     Tobacco Use    Smoking status: Never    Smokeless tobacco: Never   Substance Use Topics    Alcohol use: Yes       Prior to Admission medications    Medication Sig Start Date End Date Taking? Authorizing Provider   oxyCODONE-acetaminophen (PERCOCET) 5-325 MG per tablet Take 1 tablet by mouth every 6 hours as needed for Pain for up to 3 days. Intended supply: 3 days. Take lowest dose possible to manage pain Max Daily Amount: 4 tablets 2/1/25 2/4/25 Yes Zakiya Borrego MD   carvedilol (COREG) 12.5 MG tablet Take 1 tablet by mouth 2 times daily 1/29/25   Jessica Kennedy MD   aspirin 81 MG EC tablet Take 1 tablet by mouth daily 12/26/24   Jessica Kennedy MD   losartan (COZAAR) 50 MG tablet Take 1 tablet by mouth

## 2025-02-01 NOTE — ED NOTES
SBAR given to [facility] prior to transfer. Pt with belongings and PIV.

## 2025-02-01 NOTE — ED NOTES
TRANSFER - OUT REPORT:    Verbal report given to Grace QUIROZ on Jared Boucher  being transferred to [facility] for routine progression of patient care       Report consisted of patient's Situation, Background, Assessment and   Recommendations(SBAR).     Information from the following report(s) ED Encounter Summary, Intake/Output, MAR, Recent Results, Med Rec Status, Neuro Assessment, and Event Log was reviewed with the receiving nurse.    [place] Assessment:    Presents to emergency department  because of falls (Syncope, seizure, or loss of consciousness): No  Age > 70: No  Altered Mental Status, Intoxication with alcohol or substance confusion (Disorientation, impaired judgment, poor safety awaremess, or inability to follow instructions): No  Impaired Mobility: Ambulates or transfers with assistive devices or assistance; Unable to ambulate or transer.: No  Nursing Judgement: No          Lines:   Peripheral IV 01/31/25 Distal;Left;Anterior Antecubital (Active)   Site Assessment Clean, dry & intact 01/31/25 1643   Line Status Normal saline locked 01/31/25 1643   Phlebitis Assessment No symptoms 01/31/25 1643   Infiltration Assessment 0 01/31/25 1643   Alcohol Cap Used Yes 01/31/25 1643   Dressing Status Clean, dry & intact 01/31/25 1643   Dressing Type Transparent 01/31/25 1643   Dressing Intervention New 01/31/25 1643        Opportunity for questions and clarification was provided.      Patient transported with:  Monitor

## 2025-02-01 NOTE — ED NOTES
Patient arrived from [place] ED for vascular surgery Dr. Hobbs evaluation for right arm artery dissection after catheterization.  Extremity has +2 radial pulse, motor and sensation grossly intact.  Mild bruising at AC site.  Evaluated by surgeon in the ED who recommends pain control, discharge.  If symptoms do not improve, follow-up in clinic.  Patient is agreeable plan.    MD Elmo Zhou Amrita, MD  02/01/25 0041

## 2025-02-04 ENCOUNTER — HOSPITAL ENCOUNTER (EMERGENCY)
Facility: HOSPITAL | Age: 65
Discharge: HOME | End: 2025-02-04
Attending: EMERGENCY MEDICINE
Payer: COMMERCIAL

## 2025-02-04 VITALS
TEMPERATURE: 98.4 F | OXYGEN SATURATION: 98 % | DIASTOLIC BLOOD PRESSURE: 86 MMHG | SYSTOLIC BLOOD PRESSURE: 127 MMHG | HEART RATE: 84 BPM | RESPIRATION RATE: 18 BRPM | BODY MASS INDEX: 23.54 KG/M2 | HEIGHT: 67 IN | WEIGHT: 150 LBS

## 2025-02-04 DIAGNOSIS — M79.601 RIGHT ARM PAIN: Primary | ICD-10-CM

## 2025-02-04 LAB
ANION GAP SERPL CALC-SCNC: 2 MMOL/L (ref 2–12)
BASOPHILS # BLD: 0.03 K/UL (ref 0–0.1)
BASOPHILS NFR BLD: 0.6 % (ref 0–1)
BUN SERPL-MCNC: 16 MG/DL (ref 6–20)
BUN/CREAT SERPL: 12 (ref 12–20)
CALCIUM SERPL-MCNC: 9.1 MG/DL (ref 8.5–10.1)
CHLORIDE SERPL-SCNC: 110 MMOL/L (ref 97–108)
CO2 SERPL-SCNC: 30 MMOL/L (ref 21–32)
CREAT SERPL-MCNC: 1.29 MG/DL (ref 0.7–1.3)
DIFFERENTIAL METHOD BLD: NORMAL
EKG ATRIAL RATE: 80 BPM
EKG DIAGNOSIS: NORMAL
EKG P AXIS: 72 DEGREES
EKG P-R INTERVAL: 164 MS
EKG Q-T INTERVAL: 360 MS
EKG QRS DURATION: 72 MS
EKG QTC CALCULATION (BAZETT): 415 MS
EKG R AXIS: 37 DEGREES
EKG T AXIS: 62 DEGREES
EKG VENTRICULAR RATE: 80 BPM
EOSINOPHIL # BLD: 0.11 K/UL (ref 0–0.4)
EOSINOPHIL NFR BLD: 2.1 % (ref 0–7)
ERYTHROCYTE [DISTWIDTH] IN BLOOD BY AUTOMATED COUNT: 13.2 % (ref 11.5–14.5)
GLUCOSE SERPL-MCNC: 147 MG/DL (ref 65–100)
HCT VFR BLD AUTO: 41.6 % (ref 36.6–50.3)
HGB BLD-MCNC: 13.9 G/DL (ref 12.1–17)
IMM GRANULOCYTES # BLD AUTO: 0.01 K/UL (ref 0–0.04)
IMM GRANULOCYTES NFR BLD AUTO: 0.2 % (ref 0–0.5)
LYMPHOCYTES # BLD: 1.29 K/UL (ref 0.8–3.5)
LYMPHOCYTES NFR BLD: 25.1 % (ref 12–49)
MCH RBC QN AUTO: 29.8 PG (ref 26–34)
MCHC RBC AUTO-ENTMCNC: 33.4 G/DL (ref 30–36.5)
MCV RBC AUTO: 89.1 FL (ref 80–99)
MONOCYTES # BLD: 0.39 K/UL (ref 0–1)
MONOCYTES NFR BLD: 7.6 % (ref 5–13)
NEUTS SEG # BLD: 3.3 K/UL (ref 1.8–8)
NEUTS SEG NFR BLD: 64.4 % (ref 32–75)
NRBC # BLD: 0 K/UL (ref 0–0.01)
NRBC BLD-RTO: 0 PER 100 WBC
PLATELET # BLD AUTO: 271 K/UL (ref 150–400)
PMV BLD AUTO: 8.9 FL (ref 8.9–12.9)
POTASSIUM SERPL-SCNC: 3.9 MMOL/L (ref 3.5–5.1)
RBC # BLD AUTO: 4.67 M/UL (ref 4.1–5.7)
SODIUM SERPL-SCNC: 142 MMOL/L (ref 136–145)
TROPONIN I SERPL HS-MCNC: <4 NG/L (ref 0–76)
WBC # BLD AUTO: 5.1 K/UL (ref 4.1–11.1)

## 2025-02-04 PROCEDURE — 99284 EMERGENCY DEPT VISIT MOD MDM: CPT

## 2025-02-04 PROCEDURE — 80048 BASIC METABOLIC PNL TOTAL CA: CPT

## 2025-02-04 PROCEDURE — 84484 ASSAY OF TROPONIN QUANT: CPT

## 2025-02-04 PROCEDURE — 93005 ELECTROCARDIOGRAM TRACING: CPT | Performed by: EMERGENCY MEDICINE

## 2025-02-04 PROCEDURE — 85025 COMPLETE CBC W/AUTO DIFF WBC: CPT

## 2025-02-04 PROCEDURE — 93010 ELECTROCARDIOGRAM REPORT: CPT | Performed by: INTERNAL MEDICINE

## 2025-02-04 PROCEDURE — 36415 COLL VENOUS BLD VENIPUNCTURE: CPT

## 2025-02-04 PROCEDURE — 6370000000 HC RX 637 (ALT 250 FOR IP)

## 2025-02-04 RX ORDER — OXYCODONE AND ACETAMINOPHEN 5; 325 MG/1; MG/1
1 TABLET ORAL
Status: COMPLETED | OUTPATIENT
Start: 2025-02-04 | End: 2025-02-04

## 2025-02-04 RX ADMIN — OXYCODONE HYDROCHLORIDE AND ACETAMINOPHEN 1 TABLET: 5; 325 TABLET ORAL at 14:42

## 2025-02-04 ASSESSMENT — PAIN DESCRIPTION - DESCRIPTORS: DESCRIPTORS: ACHING

## 2025-02-04 ASSESSMENT — ENCOUNTER SYMPTOMS
COLOR CHANGE: 0
SHORTNESS OF BREATH: 0

## 2025-02-04 ASSESSMENT — PAIN SCALES - GENERAL: PAINLEVEL_OUTOF10: 5

## 2025-02-04 ASSESSMENT — PAIN DESCRIPTION - LOCATION: LOCATION: ARM

## 2025-02-04 NOTE — CONSULTS
Vascular Surgery Consult Note  2/4/2025    Subjective:     Jared Boucher is a 64 y.o. male who had a cardiac cath w/o intervention on 1/23 via Rt radial approach.  Note mentions that 5 and 6 Fr guides could not be passed.  He presented c/o Rt forearm pain on 2/1 and was told to follow up with vascular in a few days   CTA showed what was read as a ? Dissection of the anterior interosseous artery.  Dr. Hobbs CTA findings are likely due to a wire injury of a small branch but favor a small pseudoaneurysm over dissection.   He presents again to today with right arm pain. Patient seen and examined. Sitting in waiting room calmly.  Pain is located to the forearm where there is associated swelling. Pain on palpation. Denies pain to hand. There is an IV placed to his right AC. No numbness. No tingling. No loss of sensation. No motor impairment. He is frustrated by the pain and how it is limiting his day to day activity.     Past Medical History:   Diagnosis Date    Chronic back pain     Dyslipidemia     Hypertension     Prostate cancer (HCC)     Sleep apnea     STOPPED USING CPAP      Past Surgical History:   Procedure Laterality Date    CARDIAC PROCEDURE N/A 1/23/2025    Left heart cath / coronary angiography performed by Patricio Newton DO at [facility] CARDIAC CATH LAB    GI      COLONOSCOPY    INVASIVE VASCULAR N/A 1/23/2025    Ultrasound guided vascular access performed by Patricio Newton DO at [facility] CARDIAC CATH LAB    PROSTATECTOMY      PROSTATE BIOPSY     Family History   Problem Relation Age of Onset    Hypertension Mother     Diabetes Mother     Anesth Problems Neg Hx       Social History     Tobacco Use    Smoking status: Never    Smokeless tobacco: Never   Substance Use Topics    Alcohol use: Yes       Prior to Admission medications    Medication Sig Start Date End Date Taking? Authorizing Provider   oxyCODONE-acetaminophen (PERCOCET) 5-325 MG per tablet Take 1 tablet by mouth every 6 hours as needed for Pain for up to

## 2025-02-04 NOTE — ED TRIAGE NOTES
Patient ambulatory to ER triage for complaints of right arm pain following catheterization on 01/23. Diagnosed with dissection of artery in right arm on 01/31, scheduled to follow up with vascular surgery on Thursday 02/06.    Sites used for cath on right wrist.     Reports pain has worsened since yesterday.     Reports palpitations.     Denies chest pain and SOB.

## 2025-02-04 NOTE — DISCHARGE INSTRUCTIONS
Please follow-up with vascular surgery, the office of Dr. Mic Morley, contact information listed here, for further evaluation.  They are expecting your call.  If symptoms worsen or new concerning symptoms arise, please report to nearest emergency department.    Thank you for allowing us to provide you with medical care today.  We realize that you have many choices for your emergency care needs.  We thank you for choosing [facility].  Please choose us in the future for any continued health care needs.     The exam and treatment you received in the Emergency Department were for an emergent problem and are not intended as complete care. It is important that you follow up with a doctor, nurse practitioner, or physician assistant for ongoing care. If your symptoms worsen or you do not improve as expected and you are unable to reach your usual health care provider, you should return to the Emergency Department.  We are available 24 hours a day.     Please make an appointment with your health care provider(s) for follow up of your Emergency Department visit.  Take this sheet with you when you go to your follow-up visit.

## 2025-02-04 NOTE — ED PROVIDER NOTES
[facility] EMERGENCY DEPARTMENT  EMERGENCY DEPARTMENT ENCOUNTER      Pt Name: Jared Boucher  MRN: [id number]  Birthdate 1960  Date of evaluation: 2/4/2025  Provider: Roosevelt Sr PA-C    CHIEF COMPLAINT       Chief Complaint   Patient presents with    Arm Pain    Palpitations         HISTORY OF PRESENT ILLNESS   (Location/Symptom, Timing/Onset, Context/Setting, Quality, Duration, Modifying Factors, Severity)  Note limiting factors.   64 year old male with history of hypertension, prostate cancer/prostatectomy, chronic back pain, and seen in ED for similar symptoms 4 days ago, reports to ED with ongoing right forearm pain after cardiac angiography with right radial access nearly 2 weeks ago and brief sensation earlier today of \"heart palpitations.\"  Patient stated he has follow-up with vascular/ Dr Morley's office in 2 days, but due to ongoing pain, is reporting to ED for further evaluation.  Denies any changes in nature of forearm pain, redness, swelling, experiencing any chest pain, shortness of breath, or fever.  His CTA upper extremity 4 days ago in ED shows possible dissection versus pseudoaneurysm from wire injury.              Review of External Medical Records:     Nursing Notes were reviewed.    REVIEW OF SYSTEMS    (2-9 systems for level 4, 10 or more for level 5)     Review of Systems   All other systems reviewed and are negative.      Except as noted above the remainder of the review of systems was reviewed and negative.       PAST MEDICAL HISTORY     Past Medical History:   Diagnosis Date    Chronic back pain     Dyslipidemia     Hypertension     Prostate cancer (HCC)     Sleep apnea     STOPPED USING CPAP         SURGICAL HISTORY       Past Surgical History:   Procedure Laterality Date    CARDIAC PROCEDURE N/A 1/23/2025    Left heart cath / coronary angiography performed by Patricio Newton DO at [facility] CARDIAC CATH LAB    GI      COLONOSCOPY    INVASIVE VASCULAR N/A

## 2025-02-10 PROBLEM — I70.208: Status: ACTIVE | Noted: 2025-02-10

## 2025-02-17 ENCOUNTER — CLINICAL DOCUMENTATION (OUTPATIENT)
Facility: CLINIC | Age: 65
End: 2025-02-17

## 2025-02-28 NOTE — PROGRESS NOTES
Primary Cardiologist:  Jessica Kennedy MD. [facility]          Patient: Jared Boucher  : 1960      Today's Date: 3/3/2025        HISTORY OF PRESENT ILLNESS:     History of Present Illness:    Referred for abnormal stress test.   He has chronic chest pain - atypical - sometimes with heavy lifting.  Denies Sig SOB.   Shoulder always is stiff.     Pt denies increase in CP, SOB, palpitations.  He is asking how much ETOH he can drink. States he normally \"gets drunk\", however last drink was in Nov. Advised against drinking and drinking to excess.  He works [employer] for [employer] for [employer]      PAST MEDICAL HISTORY:     Past Medical History:   Diagnosis Date    Chronic back pain     Dyslipidemia     Hypertension     Prostate cancer (HCC)     Sleep apnea     STOPPED USING CPAP       Past Surgical History:   Procedure Laterality Date    CARDIAC PROCEDURE N/A 2025    Left heart cath / coronary angiography performed by Patricio Newton DO at [facility] CARDIAC CATH LAB    GI      COLONOSCOPY    INVASIVE VASCULAR N/A 2025    Ultrasound guided vascular access performed by Patricio Newton DO at [facility] CARDIAC CATH LAB    PROSTATECTOMY      PROSTATE BIOPSY             CURRENT MEDICATIONS:    .  Current Outpatient Medications   Medication Sig Dispense Refill    carvedilol (COREG) 12.5 MG tablet Take 1 tablet by mouth 2 times daily 180 tablet 3    aspirin 81 MG EC tablet Take 1 tablet by mouth daily 90 tablet 0    losartan (COZAAR) 50 MG tablet Take 1 tablet by mouth daily 90 tablet 1    amLODIPine (NORVASC) 10 MG tablet Take 1 tablet by mouth daily 90 tablet 1    atorvastatin (LIPITOR) 40 MG tablet Take 1 tablet by mouth daily 90 tablet 1    Multiple Vitamin (ONE-A-DAY MENS PO) Take by mouth      Alpha-Lipoic Acid 600 MG CAPS Take by mouth (Patient not taking: Reported on 2025)       No current facility-administered medications for this visit.       No Known Allergies      SOCIAL HISTORY:     Social History

## 2025-03-03 ENCOUNTER — OFFICE VISIT (OUTPATIENT)
Age: 65
End: 2025-03-03
Payer: COMMERCIAL

## 2025-03-03 VITALS
SYSTOLIC BLOOD PRESSURE: 122 MMHG | BODY MASS INDEX: 23.54 KG/M2 | HEIGHT: 67 IN | DIASTOLIC BLOOD PRESSURE: 74 MMHG | WEIGHT: 150 LBS | HEART RATE: 77 BPM | OXYGEN SATURATION: 98 %

## 2025-03-03 DIAGNOSIS — M79.601 RIGHT ARM PAIN: ICD-10-CM

## 2025-03-03 DIAGNOSIS — E78.2 MIXED DYSLIPIDEMIA: ICD-10-CM

## 2025-03-03 DIAGNOSIS — I10 PRIMARY HYPERTENSION: ICD-10-CM

## 2025-03-03 DIAGNOSIS — Z09 HOSPITAL DISCHARGE FOLLOW-UP: Primary | ICD-10-CM

## 2025-03-03 DIAGNOSIS — R07.9 CHEST PAIN, UNSPECIFIED TYPE: ICD-10-CM

## 2025-03-03 DIAGNOSIS — R94.39 ABNORMAL STRESS TEST: ICD-10-CM

## 2025-03-03 PROCEDURE — 99214 OFFICE O/P EST MOD 30 MIN: CPT

## 2025-03-03 PROCEDURE — 1123F ACP DISCUSS/DSCN MKR DOCD: CPT

## 2025-03-03 PROCEDURE — 3074F SYST BP LT 130 MM HG: CPT

## 2025-03-03 PROCEDURE — 3078F DIAST BP <80 MM HG: CPT

## 2025-03-03 NOTE — PROGRESS NOTES
Chief Complaint   Patient presents with    Chest Pain    Hypertension    Cholesterol Problem     Vitals:    03/03/25 0908 03/03/25 0909   BP: (!) 142/80 (!) 142/80   Pulse: 77    SpO2: 98%    Height: 1.702 m (5' 7\")       BP (!) 142/80   Pulse 77   Ht 1.702 m (5' 7\")   SpO2 98%   BMI 23.49 kg/m²

## 2025-03-25 RX ORDER — ASPIRIN 81 MG/1
81 TABLET, COATED ORAL DAILY
Qty: 90 TABLET | Refills: 1 | Status: SHIPPED | OUTPATIENT
Start: 2025-03-25

## 2025-04-23 ENCOUNTER — OFFICE VISIT (OUTPATIENT)
Facility: CLINIC | Age: 65
End: 2025-04-23
Payer: COMMERCIAL

## 2025-04-23 VITALS
RESPIRATION RATE: 19 BRPM | HEART RATE: 78 BPM | HEIGHT: 67 IN | WEIGHT: 158 LBS | SYSTOLIC BLOOD PRESSURE: 136 MMHG | OXYGEN SATURATION: 99 % | DIASTOLIC BLOOD PRESSURE: 83 MMHG | BODY MASS INDEX: 24.8 KG/M2

## 2025-04-23 DIAGNOSIS — G89.29 CHRONIC NONINTRACTABLE HEADACHE, UNSPECIFIED HEADACHE TYPE: ICD-10-CM

## 2025-04-23 DIAGNOSIS — G62.9 PERIPHERAL POLYNEUROPATHY: ICD-10-CM

## 2025-04-23 DIAGNOSIS — E78.00 HYPERCHOLESTEREMIA: ICD-10-CM

## 2025-04-23 DIAGNOSIS — R90.82 WHITE MATTER DISEASE, UNSPECIFIED: ICD-10-CM

## 2025-04-23 DIAGNOSIS — R51.9 CHRONIC NONINTRACTABLE HEADACHE, UNSPECIFIED HEADACHE TYPE: ICD-10-CM

## 2025-04-23 DIAGNOSIS — I10 ESSENTIAL HYPERTENSION: Primary | ICD-10-CM

## 2025-04-23 DIAGNOSIS — R90.89 ABNORMAL BRAIN MRI: ICD-10-CM

## 2025-04-23 DIAGNOSIS — R73.03 PREDIABETES: ICD-10-CM

## 2025-04-23 DIAGNOSIS — H91.92 HEARING LOSS OF LEFT EAR, UNSPECIFIED HEARING LOSS TYPE: ICD-10-CM

## 2025-04-23 PROCEDURE — 99214 OFFICE O/P EST MOD 30 MIN: CPT | Performed by: STUDENT IN AN ORGANIZED HEALTH CARE EDUCATION/TRAINING PROGRAM

## 2025-04-23 PROCEDURE — 1123F ACP DISCUSS/DSCN MKR DOCD: CPT | Performed by: STUDENT IN AN ORGANIZED HEALTH CARE EDUCATION/TRAINING PROGRAM

## 2025-04-23 PROCEDURE — 3079F DIAST BP 80-89 MM HG: CPT | Performed by: STUDENT IN AN ORGANIZED HEALTH CARE EDUCATION/TRAINING PROGRAM

## 2025-04-23 PROCEDURE — 3075F SYST BP GE 130 - 139MM HG: CPT | Performed by: STUDENT IN AN ORGANIZED HEALTH CARE EDUCATION/TRAINING PROGRAM

## 2025-04-23 RX ORDER — ATORVASTATIN CALCIUM 40 MG/1
40 TABLET, FILM COATED ORAL DAILY
Qty: 90 TABLET | Refills: 1 | Status: SHIPPED | OUTPATIENT
Start: 2025-04-23

## 2025-04-23 RX ORDER — LOSARTAN POTASSIUM 100 MG/1
100 TABLET ORAL DAILY
Qty: 90 TABLET | Refills: 1 | Status: SHIPPED | OUTPATIENT
Start: 2025-04-23

## 2025-04-23 RX ORDER — ASPIRIN 81 MG
TABLET,CHEWABLE ORAL
Qty: 60 G | Refills: 2 | Status: SHIPPED | OUTPATIENT
Start: 2025-04-23

## 2025-04-23 RX ORDER — AMLODIPINE BESYLATE 10 MG/1
10 TABLET ORAL DAILY
Qty: 90 TABLET | Refills: 1 | Status: SHIPPED | OUTPATIENT
Start: 2025-04-23

## 2025-04-23 SDOH — ECONOMIC STABILITY: FOOD INSECURITY: WITHIN THE PAST 12 MONTHS, YOU WORRIED THAT YOUR FOOD WOULD RUN OUT BEFORE YOU GOT MONEY TO BUY MORE.: NEVER TRUE

## 2025-04-23 SDOH — ECONOMIC STABILITY: FOOD INSECURITY: WITHIN THE PAST 12 MONTHS, THE FOOD YOU BOUGHT JUST DIDN'T LAST AND YOU DIDN'T HAVE MONEY TO GET MORE.: NEVER TRUE

## 2025-04-23 ASSESSMENT — PATIENT HEALTH QUESTIONNAIRE - PHQ9
SUM OF ALL RESPONSES TO PHQ QUESTIONS 1-9: 0
1. LITTLE INTEREST OR PLEASURE IN DOING THINGS: NOT AT ALL
2. FEELING DOWN, DEPRESSED OR HOPELESS: NOT AT ALL

## 2025-04-23 NOTE — PROGRESS NOTES
The patient (or guardian, if applicable) and other individuals in attendance with the patient were advised that Artificial Intelligence will be utilized during this visit to record, process the conversation to generate a clinical note, and support improvement of the AI technology. The patient (or guardian, if applicable) and other individuals in attendance at the appointment consented to the use of AI, including the recording.        Jared Boucher is a 65 y.o. male , id x 2(name and ). Reviewed record, history, and  medications.    Chief Complaint   Patient presents with    Follow-up        Health Maintenance Due   Topic    Shingles vaccine (3 of 3)    Diabetic retinal exam     Diabetic foot exam     COVID-19 Vaccine ( season)    Prostate Specific Antigen (PSA) Screening or Monitoring        Wt Readings from Last 1 Encounters:   25 71.7 kg (158 lb)     BP Readings from Last 3 Encounters:   25 136/83   25 122/74   25 (!) 142/80     Pulse Readings from Last 1 Encounters:   25 78         Patient is currently accompanied by self & I have received verbal consent from Jared Boucher to discuss any/all medical information while he/she is present in the room.    Home BP cuff present today:  no    Home medication list or bottles present today: no  - All medications were reviewed and updated with the patient today in office.    Forms for completion: no    Chest pain/SOB no    Surgery within the next month:  no      Depression Screening:  :     Depression: Not at risk (2025)    PHQ-2     PHQ-2 Score: 0          Coordination of Care Questionnaire:  :     \"Have you been to the ER, urgent care clinic since your last visit?  Hospitalized since your last visit?\"    NO    “Have you seen or consulted any other health care providers outside of [facility] since your last visit?”    NO    Click Here for Release of Records Request      --Carrie Bryson CMA

## 2025-04-23 NOTE — PROGRESS NOTES
Progress Note    he is a 65 y.o. year old male who presents for evaluation of Follow-up        Assessment/ Plan:     1. Essential hypertension  -     losartan (COZAAR) 100 MG tablet; Take 1 tablet by mouth daily, Disp-90 tablet, R-1Normal  -     amLODIPine (NORVASC) 10 MG tablet; Take 1 tablet by mouth daily, Disp-90 tablet, R-1Normal  2. Hypercholesteremia  -     atorvastatin (LIPITOR) 40 MG tablet; Take 1 tablet by mouth daily, Disp-90 tablet, R-1Normal  -     Comprehensive Metabolic Panel; Future  -     Lipid Panel; Future  3. Hearing loss of left ear, unspecified hearing loss type  -     MRI BRAIN W WO CONTRAST; Future  4. White matter disease, unspecified  -     MRI BRAIN W WO CONTRAST; Future  5. Chronic nonintractable headache, unspecified headache type  -     MRI BRAIN W WO CONTRAST; Future  6. Abnormal brain MRI  -     MRI BRAIN W WO CONTRAST; Future  7. Prediabetes  -     Hemoglobin A1C; Future  8. Peripheral polyneuropathy  -     Capsaicin 0.1 % CREA; Apply a thin layer to feet two times daily, Disp-60 g, R-2Normal      Results  Imaging   - MRI of brain: 2014, Small vascular abnormality and white matter disease    Diagnostic Testing   - Stress test: Changes in heart electricity indicating the heart was struggling for blood flow was the indication for his heart catheterization    Assessment & Plan  1. Hypertension.  - Blood pressure readings have been consistently elevated, with several measurements in the 120s and a significant number in the 140s.  - No reported side effects from current medications.  - Dosage of losartan will be increased to better manage hypertension. Follow-up appointment scheduled in 2 to 3 weeks to assess effectiveness.  - Patient will continue monitoring blood pressure at home.    2. Neuropathic pain.  - Reports persistent pain in feet and back, with previous medications providing temporary relief.  - No recent neurological changes other than ringing in ears.  - Capsaicin cream

## 2025-04-24 LAB
ALBUMIN SERPL-MCNC: 4.2 G/DL (ref 3.9–4.9)
ALP SERPL-CCNC: 111 IU/L (ref 44–121)
ALT SERPL-CCNC: 15 IU/L (ref 0–44)
AST SERPL-CCNC: 17 IU/L (ref 0–40)
BILIRUB SERPL-MCNC: 0.6 MG/DL (ref 0–1.2)
BUN SERPL-MCNC: 16 MG/DL (ref 8–27)
BUN/CREAT SERPL: 14 (ref 10–24)
CALCIUM SERPL-MCNC: 9.4 MG/DL (ref 8.6–10.2)
CHLORIDE SERPL-SCNC: 103 MMOL/L (ref 96–106)
CHOLEST SERPL-MCNC: 112 MG/DL (ref 100–199)
CO2 SERPL-SCNC: 25 MMOL/L (ref 20–29)
CREAT SERPL-MCNC: 1.12 MG/DL (ref 0.76–1.27)
EGFRCR SERPLBLD CKD-EPI 2021: 72 ML/MIN/1.73
GLOBULIN SER CALC-MCNC: 2.2 G/DL (ref 1.5–4.5)
GLUCOSE SERPL-MCNC: 106 MG/DL (ref 70–99)
HBA1C MFR BLD: 6.1 % (ref 4.8–5.6)
HDLC SERPL-MCNC: 50 MG/DL
IMP & REVIEW OF LAB RESULTS: NORMAL
LDLC SERPL CALC-MCNC: 46 MG/DL (ref 0–99)
POTASSIUM SERPL-SCNC: 4.4 MMOL/L (ref 3.5–5.2)
PROT SERPL-MCNC: 6.4 G/DL (ref 6–8.5)
SODIUM SERPL-SCNC: 142 MMOL/L (ref 134–144)
TRIGL SERPL-MCNC: 80 MG/DL (ref 0–149)
VLDLC SERPL CALC-MCNC: 16 MG/DL (ref 5–40)

## 2025-04-28 ENCOUNTER — RESULTS FOLLOW-UP (OUTPATIENT)
Facility: CLINIC | Age: 65
End: 2025-04-28

## 2025-05-02 ENCOUNTER — HOSPITAL ENCOUNTER (OUTPATIENT)
Facility: HOSPITAL | Age: 65
Discharge: HOME | End: 2025-05-02
Attending: STUDENT IN AN ORGANIZED HEALTH CARE EDUCATION/TRAINING PROGRAM
Payer: COMMERCIAL

## 2025-05-02 DIAGNOSIS — R90.89 ABNORMAL BRAIN MRI: ICD-10-CM

## 2025-05-02 DIAGNOSIS — G89.29 CHRONIC NONINTRACTABLE HEADACHE, UNSPECIFIED HEADACHE TYPE: ICD-10-CM

## 2025-05-02 DIAGNOSIS — R90.82 WHITE MATTER DISEASE, UNSPECIFIED: ICD-10-CM

## 2025-05-02 DIAGNOSIS — R51.9 CHRONIC NONINTRACTABLE HEADACHE, UNSPECIFIED HEADACHE TYPE: ICD-10-CM

## 2025-05-02 DIAGNOSIS — H91.92 HEARING LOSS OF LEFT EAR, UNSPECIFIED HEARING LOSS TYPE: ICD-10-CM

## 2025-05-02 PROCEDURE — A9579 GAD-BASE MR CONTRAST NOS,1ML: HCPCS | Performed by: STUDENT IN AN ORGANIZED HEALTH CARE EDUCATION/TRAINING PROGRAM

## 2025-05-02 PROCEDURE — 6360000004 HC RX CONTRAST MEDICATION: Performed by: STUDENT IN AN ORGANIZED HEALTH CARE EDUCATION/TRAINING PROGRAM

## 2025-05-02 PROCEDURE — 70553 MRI BRAIN STEM W/O & W/DYE: CPT

## 2025-05-02 RX ADMIN — GADOTERIDOL 14 ML: 279.3 INJECTION, SOLUTION INTRAVENOUS at 11:10

## 2025-05-07 ENCOUNTER — OFFICE VISIT (OUTPATIENT)
Facility: CLINIC | Age: 65
End: 2025-05-07
Payer: COMMERCIAL

## 2025-05-07 VITALS
BODY MASS INDEX: 24.64 KG/M2 | HEIGHT: 67 IN | RESPIRATION RATE: 18 BRPM | SYSTOLIC BLOOD PRESSURE: 148 MMHG | DIASTOLIC BLOOD PRESSURE: 93 MMHG | HEART RATE: 74 BPM | OXYGEN SATURATION: 99 % | WEIGHT: 157 LBS

## 2025-05-07 DIAGNOSIS — G62.9 PERIPHERAL POLYNEUROPATHY: Primary | ICD-10-CM

## 2025-05-07 DIAGNOSIS — I10 ESSENTIAL HYPERTENSION: ICD-10-CM

## 2025-05-07 PROCEDURE — 1123F ACP DISCUSS/DSCN MKR DOCD: CPT | Performed by: STUDENT IN AN ORGANIZED HEALTH CARE EDUCATION/TRAINING PROGRAM

## 2025-05-07 PROCEDURE — 99214 OFFICE O/P EST MOD 30 MIN: CPT | Performed by: STUDENT IN AN ORGANIZED HEALTH CARE EDUCATION/TRAINING PROGRAM

## 2025-05-07 PROCEDURE — 3079F DIAST BP 80-89 MM HG: CPT | Performed by: STUDENT IN AN ORGANIZED HEALTH CARE EDUCATION/TRAINING PROGRAM

## 2025-05-07 PROCEDURE — 3077F SYST BP >= 140 MM HG: CPT | Performed by: STUDENT IN AN ORGANIZED HEALTH CARE EDUCATION/TRAINING PROGRAM

## 2025-05-07 RX ORDER — DESIPRAMINE HYDROCHLORIDE 10 MG/1
10 TABLET ORAL NIGHTLY
Qty: 90 TABLET | Refills: 1 | Status: SHIPPED | OUTPATIENT
Start: 2025-05-07

## 2025-05-07 RX ORDER — GABAPENTIN 100 MG/1
100 CAPSULE ORAL 3 TIMES DAILY PRN
Qty: 180 CAPSULE | Refills: 0 | Status: SHIPPED | OUTPATIENT
Start: 2025-05-07 | End: 2025-08-05

## 2025-05-07 NOTE — PATIENT INSTRUCTIONS
You should purchase a blood pressure cuff to check your blood pressure at home.  Daily if it is running high.  Weekly if it is running at goal.    Check first thing in the morning.    You should be relaxed, seated with back supported, feet flat on the floor, arm with cuff resting at heart height.    You should check your blood pressure 1-3 times.    Please write down your blood pressures and bring this record and your blood pressure cuff/machine to your follow-up visit.     I would like for your blood pressure to be less than 140 for the top number and less than 90 for the bottom number.   Please follow-up sooner for consistently high blood pressure readings at home.

## 2025-05-07 NOTE — PROGRESS NOTES
The patient (or guardian, if applicable) and other individuals in attendance with the patient were advised that Artificial Intelligence will be utilized during this visit to record, process the conversation to generate a clinical note, and support improvement of the AI technology. The patient (or guardian, if applicable) and other individuals in attendance at the appointment consented to the use of AI, including the recording.        Jared Boucher is a 65 y.o. male , id x 2(name and ). Reviewed record, history, and  medications.    Chief Complaint   Patient presents with    Blood Pressure Check        Health Maintenance Due   Topic    Shingles vaccine (3 of 3)    Diabetic retinal exam     Diabetic foot exam     COVID-19 Vaccine ( season)    Prostate Specific Antigen (PSA) Screening or Monitoring        Wt Readings from Last 1 Encounters:   25 71.2 kg (157 lb)     BP Readings from Last 3 Encounters:   25 (!) 148/93   25 136/83   25 122/74     Pulse Readings from Last 1 Encounters:   25 74         Patient is currently accompanied by self & I have received verbal consent from Jared Boucher to discuss any/all medical information while he/she is present in the room.    All medications were reviewed and updated with the patient today in office.    Forms for completion: no    Chest pain/SOB no    Surgery within the next month:  no      Depression Screening:  :     Depression: Not at risk (2025)    PHQ-2     PHQ-2 Score: 0          Coordination of Care Questionnaire:  :     \"Have you been to the ER, urgent care clinic since your last visit?  Hospitalized since your last visit?\"    NO    “Have you seen or consulted any other health care providers outside of [facility] since your last visit?”    NO    Click Here for Release of Records Request      --Carrie Bryson CMA       ------------------------------------------------        Progress Note    he is a 65

## 2025-05-24 NOTE — TELEPHONE ENCOUNTER
Pharmacy Vancomycin Note  Date of Service May 24, 2025  Patient's  1946   79 year old, male    Indication: Skin and Soft Tissue Infection  Day of Therapy: 4  Current vancomycin regimen:  1000 mg IV q24h  Current vancomycin monitoring method: AUC  Current vancomycin therapeutic monitoring goal: 400-600 mg*h/L    Insight[employer] Prediction of Current Vancomycin Regimen  Regimen: 1000 mg IV every 24 hours.  Start time: 16:00 on 2025  Exposure target: AUC24 (range) 400-600 mg/L.hr   AUC24,ss: 368 mg/L.hr  Probability of AUC24 > 400: 34 %  Ctrough,ss: 11 mg/L  Probability of Ctrough,ss > 20: 3 %  Probability of nephrotoxicity (Lodise MAMI ): 7 %    Current estimated CrCl = Estimated Creatinine Clearance: 56.7 mL/min (based on SCr of 1.13 mg/dL).    Creatinine for last 3 days  2025:  8:56 PM Creatinine 1.87 mg/dL  2025:  6:53 AM Creatinine 1.67 mg/dL  2025:  7:22 AM Creatinine 1.46 mg/dL  2025:  7:28 AM Creatinine 1.13 mg/dL    Recent Vancomycin Levels (past 3 days)  2025:  7:28 AM Vancomycin 15.1 ug/mL    Vancomycin IV Administrations (past 72 hours)                     vancomycin (VANCOCIN) 1,000 mg in 200 mL dextrose intermittent infusion (mg) 1,000 mg New Bag 25 2209     1,000 mg New Bag 25 2213    vancomycin (VANCOCIN) 1,500 mg in 0.9% NaCl 265 mL intermittent infusion (mg) 1,500 mg New Bag 25 2255                    Nephrotoxins and other renal medications (From now, onward)      Start     Dose/Rate Route Frequency Ordered Stop    25 1600  vancomycin (VANCOCIN) 1,250 mg in 0.9% NaCl 262.5 mL intermittent infusion         1,250 mg  over 90 Minutes Intravenous EVERY 24 HOURS 25 0829      25 0800  furosemide (LASIX) tablet 40 mg        Note to Pharmacy: PTA Sig:Take 40 mg by mouth daily      40 mg Oral DAILY 25 0102                 Contrast Orders - past 72 hours (72h ago, onward)      None            Interpretation of levels and current  Pharmacy sent a request asking rx to be 90 day supply please. Thanks. regimen:  Vancomycin level is reflective of AUC less than 400    Has serum creatinine changed greater than 50% in last 72 hours: Yes    Urine output:  good urine output    Renal Function: Improving    Insight[employer] Prediction of Planned New Vancomycin Regimen  Regimen: 1250 mg IV every 24 hours.  Start time: 16:00 on 05/24/2025  Exposure target: AUC24 (range) 400-600 mg/L.hr   AUC24,ss: 455 mg/L.hr  Probability of AUC24 > 400: 75 %  Ctrough,ss: 13.6 mg/L  Probability of Ctrough,ss > 20: 11 %  Probability of nephrotoxicity (Lodise MAMI 2009): 9 %    Plan:  Increase Dose to 1250 mg IV q24h  Vancomycin monitoring method: AUC  Vancomycin therapeutic monitoring goal: 400-600 mg*h/L  Pharmacy will check vancomycin levels as appropriate in 1-3 Days.  Serum creatinine levels will be ordered daily for the first week of therapy and at least twice weekly for subsequent weeks.    ALEX JACK, [facility]

## 2025-06-17 ENCOUNTER — TELEPHONE (OUTPATIENT)
Facility: CLINIC | Age: 65
End: 2025-06-17

## 2025-06-17 DIAGNOSIS — R90.89 ABNORMAL BRAIN MRI: ICD-10-CM

## 2025-06-17 DIAGNOSIS — R51.9 CHRONIC NONINTRACTABLE HEADACHE, UNSPECIFIED HEADACHE TYPE: ICD-10-CM

## 2025-06-17 DIAGNOSIS — E78.00 HYPERCHOLESTEREMIA: ICD-10-CM

## 2025-06-17 DIAGNOSIS — R90.82 WHITE MATTER DISEASE, UNSPECIFIED: ICD-10-CM

## 2025-06-17 DIAGNOSIS — I10 ESSENTIAL HYPERTENSION: Primary | ICD-10-CM

## 2025-06-17 DIAGNOSIS — G89.29 CHRONIC NONINTRACTABLE HEADACHE, UNSPECIFIED HEADACHE TYPE: ICD-10-CM

## 2025-06-17 NOTE — TELEPHONE ENCOUNTER
Patient trying to schedule with [facility] and they are requesting referral order and records be faxed to them at [phone].   [facility] address: [address]   [place], [place]   Phone: [phone]    Please see attached recommendation from Dr. Aguirre:      Your MRI shows an abnormality of the blood vessels.  This was seen on previous MRIs.  If you would like to discuss this further, I recommend that you see a neurosurgeon.  Please let me know if you would like a referral.     There is evidence of vascular disease in the brain.  Please focus on managing your blood pressure, cholesterol, and sugars well.  Please focus on healthy lifestyle habits.  Please let me know if you have any questions or concerns.    Written by Carmen Aguirre MD on 5/9/2025 12:21 PM EDT      Please call patient once completed at [phone]

## 2025-07-15 ENCOUNTER — OFFICE VISIT (OUTPATIENT)
Facility: CLINIC | Age: 65
End: 2025-07-15
Payer: COMMERCIAL

## 2025-07-15 ENCOUNTER — OFFICE VISIT (OUTPATIENT)
Age: 65
End: 2025-07-15
Payer: COMMERCIAL

## 2025-07-15 VITALS
HEART RATE: 79 BPM | SYSTOLIC BLOOD PRESSURE: 132 MMHG | TEMPERATURE: 97.6 F | WEIGHT: 153.2 LBS | DIASTOLIC BLOOD PRESSURE: 86 MMHG | HEIGHT: 67 IN | OXYGEN SATURATION: 98 % | BODY MASS INDEX: 24.04 KG/M2

## 2025-07-15 VITALS
HEIGHT: 67 IN | WEIGHT: 154 LBS | BODY MASS INDEX: 24.17 KG/M2 | OXYGEN SATURATION: 97 % | HEART RATE: 70 BPM | DIASTOLIC BLOOD PRESSURE: 70 MMHG | SYSTOLIC BLOOD PRESSURE: 122 MMHG

## 2025-07-15 DIAGNOSIS — I10 ESSENTIAL HYPERTENSION: Primary | ICD-10-CM

## 2025-07-15 DIAGNOSIS — E78.00 HYPERCHOLESTEREMIA: ICD-10-CM

## 2025-07-15 DIAGNOSIS — Z85.46 HISTORY OF PROSTATE CANCER: ICD-10-CM

## 2025-07-15 DIAGNOSIS — R73.03 PREDIABETES: ICD-10-CM

## 2025-07-15 DIAGNOSIS — I10 PRIMARY HYPERTENSION: ICD-10-CM

## 2025-07-15 DIAGNOSIS — I25.118 CORONARY ARTERY DISEASE OF NATIVE ARTERY OF NATIVE HEART WITH STABLE ANGINA PECTORIS: Primary | ICD-10-CM

## 2025-07-15 DIAGNOSIS — E78.2 MIXED DYSLIPIDEMIA: ICD-10-CM

## 2025-07-15 DIAGNOSIS — I10 ESSENTIAL HYPERTENSION: ICD-10-CM

## 2025-07-15 PROCEDURE — 99214 OFFICE O/P EST MOD 30 MIN: CPT | Performed by: NURSE PRACTITIONER

## 2025-07-15 PROCEDURE — 1123F ACP DISCUSS/DSCN MKR DOCD: CPT | Performed by: SPECIALIST

## 2025-07-15 PROCEDURE — 3078F DIAST BP <80 MM HG: CPT | Performed by: SPECIALIST

## 2025-07-15 PROCEDURE — 3079F DIAST BP 80-89 MM HG: CPT | Performed by: NURSE PRACTITIONER

## 2025-07-15 PROCEDURE — 3074F SYST BP LT 130 MM HG: CPT | Performed by: SPECIALIST

## 2025-07-15 PROCEDURE — 1123F ACP DISCUSS/DSCN MKR DOCD: CPT | Performed by: NURSE PRACTITIONER

## 2025-07-15 PROCEDURE — 99214 OFFICE O/P EST MOD 30 MIN: CPT | Performed by: SPECIALIST

## 2025-07-15 PROCEDURE — 3075F SYST BP GE 130 - 139MM HG: CPT | Performed by: NURSE PRACTITIONER

## 2025-07-15 NOTE — PROGRESS NOTES
Progress Note        2 month recheck of BP  Cozaar increased to 100 mg at last visit  Is forgetting to take second/pm dose of carvedilol      Pain         Subjective:     Patient's medications, allergies, past medical, surgical, social and family histories were reviewed and updated as appropriate.    Review of Systems   All other systems reviewed and are negative.       Objective:     Vitals:    07/15/25 0807 07/15/25 0833   BP: (!) 137/90 132/86   Pulse: 79    Temp: 97.6 °F (36.4 °C)    SpO2: 98%    Weight: 69.5 kg (153 lb 3.2 oz)    Height: 1.702 m (5' 7\")        Physical Exam  Vitals and nursing note reviewed.   Constitutional:       General: He is not in acute distress.     Appearance: Normal appearance. He is normal weight. He is not ill-appearing.   HENT:      Head: Normocephalic and atraumatic.      Nose: Nose normal.      Mouth/Throat:      Mouth: Mucous membranes are moist.      Pharynx: Oropharynx is clear.   Eyes:      Extraocular Movements: Extraocular movements intact.      Conjunctiva/sclera: Conjunctivae normal.   Cardiovascular:      Rate and Rhythm: Normal rate and regular rhythm.      Pulses: Normal pulses.      Heart sounds: Normal heart sounds.   Pulmonary:      Effort: Pulmonary effort is normal.      Breath sounds: Normal breath sounds.   Musculoskeletal:         General: Normal range of motion.      Cervical back: Normal range of motion and neck supple.      Right lower leg: No edema.      Left lower leg: No edema.   Skin:     General: Skin is warm and dry.   Neurological:      General: No focal deficit present.      Mental Status: He is alert and oriented to person, place, and time.   Psychiatric:         Mood and Affect: Mood normal.         Behavior: Behavior normal.         Assessment/ Plan:     1. Essential hypertension  -     Comprehensive Metabolic Panel; Future  2. History of prostate cancer  3. Hypercholesteremia  -     Comprehensive Metabolic Panel; Future  -     Lipid Panel;

## 2025-07-15 NOTE — PROGRESS NOTES
Jessica Kennedy MD. [facility]          Patient: Jared Boucher  : 1960      Today's Date: 7/15/2025        HISTORY OF PRESENT ILLNESS:     History of Present Illness:    He has chronic chest pain - atypical lasting 2-3 seconds - sometimes with heavy lifting.  Denies Sig SOB.   Shoulder always is stiff.       PAST MEDICAL HISTORY:     Past Medical History:   Diagnosis Date    CAD (coronary artery disease)     Chronic back pain     Dyslipidemia     Hypertension     Prostate cancer (HCC)     Sleep apnea     STOPPED USING CPAP       Past Surgical History:   Procedure Laterality Date    CARDIAC PROCEDURE N/A 2025    Left heart cath / coronary angiography performed by Patricio Newton DO at [facility] CARDIAC CATH LAB    GI      COLONOSCOPY    INVASIVE VASCULAR N/A 2025    Ultrasound guided vascular access performed by Patricio Newton DO at [facility] CARDIAC CATH LAB    PROSTATECTOMY      PROSTATE BIOPSY             CURRENT MEDICATIONS:    .  Current Outpatient Medications   Medication Sig Dispense Refill    desipramine (NOPRAMIN) 10 MG tablet Take 1 tablet by mouth nightly After 1 week, may increase to 2 tablets nightly. 90 tablet 1    losartan (COZAAR) 100 MG tablet Take 1 tablet by mouth daily 90 tablet 1    amLODIPine (NORVASC) 10 MG tablet Take 1 tablet by mouth daily 90 tablet 1    atorvastatin (LIPITOR) 40 MG tablet Take 1 tablet by mouth daily 90 tablet 1    aspirin (ASPIRIN LOW DOSE) 81 MG EC tablet TAKE 1 TABLET BY MOUTH EVERY DAY 90 tablet 1    carvedilol (COREG) 12.5 MG tablet Take 1 tablet by mouth 2 times daily 180 tablet 3    Multiple Vitamin (ONE-A-DAY MENS PO) Take by mouth      gabapentin (NEURONTIN) 100 MG capsule Take 1 capsule by mouth 3 times daily as needed (nerve pain) for up to 90 days. Intended supply: 90 days Max Daily Amount: 300 mg (Patient not taking: Reported on 7/15/2025) 180 capsule 0    Capsaicin 0.1 % CREA Apply a thin layer to feet two times daily (Patient not taking: Reported on

## 2025-07-15 NOTE — PROGRESS NOTES
Jared Boucher is a 65 y.o. male , id x 2(name and ). Reviewed record, history, and  medications.  Chief Complaint   Patient presents with    Blood Pressure Check     2 month f/up    Pain     2 month f/up      Established New Doctor     Former Carla galvez        Vitals:    07/15/25 0807   BP: (!) 137/90   Pulse: 79   Temp: 97.6 °F (36.4 °C)   SpO2: 98%   Weight: 69.5 kg (153 lb 3.2 oz)   Height: 1.702 m (5' 7\")       Non-Fasting- banana and juice    Med Review  Reviewed: Medications reviewed changes as follows see rec.  Follow up actions taken notified physician.  Compliance: compliant with all meds  List provided: ptmedlist: no      \"Have you been to the ER, urgent care clinic since your last visit?  Hospitalized since your last visit?\"    NO    “Have you seen or consulted any other health care providers outside of [facility] since your last visit?”    NO        2025     8:48 AM   Amb Fall Risk Assessment and TUG Test   Do you feel unsteady or are you worried about falling?  no   2 or more falls in past year? no   Fall with injury in past year? no         2025     3:08 PM   PHQ-9    Little interest or pleasure in doing things 0   Feeling down, depressed, or hopeless 0   PHQ-2 Score 0   PHQ-9 Total Score 0          No data to display              SDOH:  Social Drivers of Health     Tobacco Use: Low Risk  (7/15/2025)    Patient History     Smoking Tobacco Use: Never     Smokeless Tobacco Use: Never     Passive Exposure: Not on file   Alcohol Use: Not At Risk (2025)    AUDIT-C     Frequency of Alcohol Consumption: 2-4 times a month     Average Number of Drinks: 1 or 2     Frequency of Binge Drinking: Never   Financial Resource Strain: Low Risk  (3/6/2024)    Overall Financial Resource Strain (CARDIA)     Difficulty of Paying Living Expenses: Not hard at all   Food Insecurity: No Food Insecurity (2025)    Hunger Vital Sign     Worried About Running Out of Food in the Last Year: Never

## 2025-07-15 NOTE — PATIENT INSTRUCTIONS
Patient Education        Learning About the Mediterranean Diet  What is the Mediterranean diet?     The Mediterranean diet is a style of eating rather than a diet plan. It features foods eaten in [place], [place], [place], and other countries along the [place] Sea. It emphasizes eating foods like fish, fruits, vegetables, beans, high-fiber breads and whole grains, nuts, and olive oil. This style of eating includes limited red meat, cheese, and sweets.  Why choose the Mediterranean diet?  A Mediterranean-style diet may improve heart health. It contains more fat than other heart-healthy diets. But the fats are mainly from nuts, unsaturated oils (such as fish oils and olive oil), and certain nut or seed oils (such as canola, soybean, or flaxseed oil). These fats may help protect the heart and blood vessels.  How can you get started on the Mediterranean diet?  Here are some things you can do to switch to a more Mediterranean way of eating.  What to eat  Eat a variety of fruits and vegetables each day, such as grapes, blueberries, tomatoes, broccoli, peppers, figs, olives, spinach, eggplant, beans, lentils, and chickpeas.  Eat a variety of whole-grain foods each day, such as oats, brown rice, and whole wheat bread, pasta, and couscous.  Eat fish at least 2 times a week. Try tuna, salmon, mackerel, lake trout, herring, or sardines.  Eat moderate amounts of low-fat dairy products, such as milk, cheese, or yogurt.  Eat moderate amounts of poultry and eggs.  Choose healthy (unsaturated) fats, such as nuts, olive oil, and certain nut or seed oils like canola, soybean, and flaxseed.  Limit unhealthy (saturated) fats, such as butter, palm oil, and coconut oil. And limit fats found in animal products, such as meat and dairy products made with whole milk. Try to eat red meat only a few times a month in very small amounts.  Limit sweets and desserts to only a few times a week. This includes sugar-sweetened

## 2025-07-15 NOTE — PROGRESS NOTES
Chief Complaint   Patient presents with    Chest Pain    Hypertension    Hyperlipidemia     Vitals:    07/15/25 0852   BP: 122/70   BP Site: Right Upper Arm   Patient Position: Sitting   BP Cuff Size: Medium Adult   Pulse: 70   SpO2: 97%   Weight: 69.9 kg (154 lb)   Height: 1.702 m (5' 7\")       Chest pain NO     ER, urgent care, or hospitalized outside of [facility] since your last visit?  NO     Refills NO

## 2025-07-16 LAB
ALBUMIN SERPL-MCNC: 4.4 G/DL (ref 3.9–4.9)
ALP SERPL-CCNC: 111 IU/L (ref 44–121)
ALT SERPL-CCNC: 16 IU/L (ref 0–44)
AST SERPL-CCNC: 22 IU/L (ref 0–40)
BILIRUB SERPL-MCNC: 0.7 MG/DL (ref 0–1.2)
BUN SERPL-MCNC: 15 MG/DL (ref 8–27)
BUN/CREAT SERPL: 12 (ref 10–24)
CALCIUM SERPL-MCNC: 9.1 MG/DL (ref 8.6–10.2)
CHLORIDE SERPL-SCNC: 103 MMOL/L (ref 96–106)
CHOLEST SERPL-MCNC: 110 MG/DL (ref 100–199)
CO2 SERPL-SCNC: 23 MMOL/L (ref 20–29)
CREAT SERPL-MCNC: 1.24 MG/DL (ref 0.76–1.27)
EGFRCR SERPLBLD CKD-EPI 2021: 64 ML/MIN/1.73
EST. AVERAGE GLUCOSE BLD GHB EST-MCNC: 140 MG/DL
GLOBULIN SER CALC-MCNC: 2.4 G/DL (ref 1.5–4.5)
GLUCOSE SERPL-MCNC: 151 MG/DL (ref 70–99)
HBA1C MFR BLD: 6.5 % (ref 4.8–5.6)
HDLC SERPL-MCNC: 50 MG/DL
IMP & REVIEW OF LAB RESULTS: NORMAL
LDLC SERPL CALC-MCNC: 48 MG/DL (ref 0–99)
Lab: NORMAL
POTASSIUM SERPL-SCNC: 4.4 MMOL/L (ref 3.5–5.2)
PROT SERPL-MCNC: 6.8 G/DL (ref 6–8.5)
SODIUM SERPL-SCNC: 141 MMOL/L (ref 134–144)
TRIGL SERPL-MCNC: 51 MG/DL (ref 0–149)
VLDLC SERPL CALC-MCNC: 12 MG/DL (ref 5–40)

## 2025-07-28 ENCOUNTER — TELEPHONE (OUTPATIENT)
Facility: CLINIC | Age: 65
End: 2025-07-28

## 2025-07-28 DIAGNOSIS — G62.9 PERIPHERAL POLYNEUROPATHY: ICD-10-CM

## 2025-07-28 RX ORDER — DESIPRAMINE HYDROCHLORIDE 10 MG/1
10 TABLET ORAL NIGHTLY
Qty: 90 TABLET | Refills: 0 | Status: SHIPPED | OUTPATIENT
Start: 2025-07-28

## 2025-07-28 NOTE — TELEPHONE ENCOUNTER
We received a fax refill request for Jared Boucher.  Please escribe Desipramine to their pharmacy.  The pharmacy is correct in the chart and they are requesting a 90 day supply.

## 2025-08-17 ENCOUNTER — APPOINTMENT (OUTPATIENT)
Facility: HOSPITAL | Age: 65
End: 2025-08-17
Payer: COMMERCIAL

## 2025-08-17 ENCOUNTER — HOSPITAL ENCOUNTER (EMERGENCY)
Facility: HOSPITAL | Age: 65
Discharge: HOME | End: 2025-08-17
Attending: EMERGENCY MEDICINE
Payer: COMMERCIAL

## 2025-08-17 VITALS
BODY MASS INDEX: 23.3 KG/M2 | DIASTOLIC BLOOD PRESSURE: 79 MMHG | WEIGHT: 145 LBS | HEIGHT: 66 IN | SYSTOLIC BLOOD PRESSURE: 130 MMHG | HEART RATE: 85 BPM | OXYGEN SATURATION: 99 % | TEMPERATURE: 98.4 F | RESPIRATION RATE: 16 BRPM

## 2025-08-17 DIAGNOSIS — R07.9 CHEST PAIN, UNSPECIFIED TYPE: Primary | ICD-10-CM

## 2025-08-17 DIAGNOSIS — M25.512 ACUTE PAIN OF LEFT SHOULDER: ICD-10-CM

## 2025-08-17 LAB
ALBUMIN SERPL-MCNC: 4.1 G/DL (ref 3.5–5.2)
ALBUMIN/GLOB SERPL: 1.6 (ref 1.1–2.2)
ALP SERPL-CCNC: 107 U/L (ref 40–129)
ALT SERPL-CCNC: 12 U/L (ref 10–50)
ANION GAP SERPL CALC-SCNC: 11 MMOL/L (ref 2–12)
AST SERPL-CCNC: 16 U/L (ref 10–50)
BASOPHILS # BLD: 0.01 K/UL (ref 0–0.1)
BASOPHILS NFR BLD: 0.1 % (ref 0–1)
BILIRUB SERPL-MCNC: 0.6 MG/DL (ref 0.2–1)
BUN SERPL-MCNC: 15 MG/DL (ref 8–23)
BUN/CREAT SERPL: 13 (ref 12–20)
CALCIUM SERPL-MCNC: 9 MG/DL (ref 8.8–10.2)
CHLORIDE SERPL-SCNC: 107 MMOL/L (ref 98–107)
CO2 SERPL-SCNC: 27 MMOL/L (ref 22–29)
COMMENT:: NORMAL
CREAT SERPL-MCNC: 1.16 MG/DL (ref 0.7–1.2)
DIFFERENTIAL METHOD BLD: ABNORMAL
EOSINOPHIL # BLD: 0.05 K/UL (ref 0–0.4)
EOSINOPHIL NFR BLD: 0.7 % (ref 0–7)
ERYTHROCYTE [DISTWIDTH] IN BLOOD BY AUTOMATED COUNT: 14 % (ref 11.5–14.5)
GLOBULIN SER CALC-MCNC: 2.6 G/DL (ref 2–4)
GLUCOSE SERPL-MCNC: 165 MG/DL (ref 65–100)
HCT VFR BLD AUTO: 39.9 % (ref 36.6–50.3)
HGB BLD-MCNC: 13.5 G/DL (ref 12.1–17)
IMM GRANULOCYTES # BLD AUTO: 0.02 K/UL (ref 0–0.04)
IMM GRANULOCYTES NFR BLD AUTO: 0.3 % (ref 0–0.5)
LYMPHOCYTES # BLD: 0.99 K/UL (ref 0.8–3.5)
LYMPHOCYTES NFR BLD: 14.5 % (ref 12–49)
MCH RBC QN AUTO: 29.9 PG (ref 26–34)
MCHC RBC AUTO-ENTMCNC: 33.8 G/DL (ref 30–36.5)
MCV RBC AUTO: 88.3 FL (ref 80–99)
MONOCYTES # BLD: 0.33 K/UL (ref 0–1)
MONOCYTES NFR BLD: 4.8 % (ref 5–13)
NEUTS SEG # BLD: 5.45 K/UL (ref 1.8–8)
NEUTS SEG NFR BLD: 79.6 % (ref 32–75)
NRBC # BLD: 0 K/UL (ref 0–0.01)
NRBC BLD-RTO: 0 PER 100 WBC
NT PRO BNP: 42 PG/ML
PLATELET # BLD AUTO: 271 K/UL (ref 150–400)
PMV BLD AUTO: 9.2 FL (ref 8.9–12.9)
POTASSIUM SERPL-SCNC: 4.1 MMOL/L (ref 3.5–5.1)
PROT SERPL-MCNC: 6.7 G/DL (ref 6.4–8.3)
RBC # BLD AUTO: 4.52 M/UL (ref 4.1–5.7)
SODIUM SERPL-SCNC: 145 MMOL/L (ref 136–145)
SPECIMEN HOLD: NORMAL
TROPONIN T SERPL HS-MCNC: 9.5 NG/L (ref 0–22)
WBC # BLD AUTO: 6.9 K/UL (ref 4.1–11.1)

## 2025-08-17 PROCEDURE — 6370000000 HC RX 637 (ALT 250 FOR IP)

## 2025-08-17 PROCEDURE — 6360000002 HC RX W HCPCS

## 2025-08-17 PROCEDURE — 96374 THER/PROPH/DIAG INJ IV PUSH: CPT

## 2025-08-17 PROCEDURE — 93005 ELECTROCARDIOGRAM TRACING: CPT

## 2025-08-17 PROCEDURE — 85025 COMPLETE CBC W/AUTO DIFF WBC: CPT

## 2025-08-17 PROCEDURE — 84484 ASSAY OF TROPONIN QUANT: CPT

## 2025-08-17 PROCEDURE — 71046 X-RAY EXAM CHEST 2 VIEWS: CPT

## 2025-08-17 PROCEDURE — 80053 COMPREHEN METABOLIC PANEL: CPT

## 2025-08-17 PROCEDURE — 99285 EMERGENCY DEPT VISIT HI MDM: CPT

## 2025-08-17 PROCEDURE — 36415 COLL VENOUS BLD VENIPUNCTURE: CPT

## 2025-08-17 PROCEDURE — 83880 ASSAY OF NATRIURETIC PEPTIDE: CPT

## 2025-08-17 RX ORDER — ACETAMINOPHEN 500 MG
1000 TABLET ORAL
Status: COMPLETED | OUTPATIENT
Start: 2025-08-17 | End: 2025-08-17

## 2025-08-17 RX ORDER — METHOCARBAMOL 500 MG/1
750 TABLET, FILM COATED ORAL ONCE
Status: COMPLETED | OUTPATIENT
Start: 2025-08-17 | End: 2025-08-17

## 2025-08-17 RX ORDER — KETOROLAC TROMETHAMINE 30 MG/ML
15 INJECTION, SOLUTION INTRAMUSCULAR; INTRAVENOUS ONCE
Status: COMPLETED | OUTPATIENT
Start: 2025-08-17 | End: 2025-08-17

## 2025-08-17 RX ORDER — METHOCARBAMOL 750 MG/1
750 TABLET, FILM COATED ORAL EVERY 6 HOURS PRN
Qty: 16 TABLET | Refills: 0 | Status: SHIPPED | OUTPATIENT
Start: 2025-08-17 | End: 2025-08-21

## 2025-08-17 RX ADMIN — KETOROLAC TROMETHAMINE 15 MG: 30 INJECTION, SOLUTION INTRAMUSCULAR at 19:02

## 2025-08-17 RX ADMIN — METHOCARBAMOL TABLETS 750 MG: 500 TABLET, COATED ORAL at 18:28

## 2025-08-17 RX ADMIN — ACETAMINOPHEN 1000 MG: 500 TABLET ORAL at 18:28

## 2025-08-17 ASSESSMENT — PAIN - FUNCTIONAL ASSESSMENT: PAIN_FUNCTIONAL_ASSESSMENT: 0-10

## 2025-08-17 ASSESSMENT — LIFESTYLE VARIABLES
HOW OFTEN DO YOU HAVE A DRINK CONTAINING ALCOHOL: MONTHLY OR LESS
HOW MANY STANDARD DRINKS CONTAINING ALCOHOL DO YOU HAVE ON A TYPICAL DAY: 1 OR 2
HOW OFTEN DO YOU HAVE A DRINK CONTAINING ALCOHOL: 2-4 TIMES A MONTH
HOW MANY STANDARD DRINKS CONTAINING ALCOHOL DO YOU HAVE ON A TYPICAL DAY: 1 OR 2

## 2025-08-17 ASSESSMENT — PAIN DESCRIPTION - ORIENTATION: ORIENTATION: LEFT

## 2025-08-17 ASSESSMENT — HEART SCORE: ECG: NORMAL

## 2025-08-17 ASSESSMENT — PAIN DESCRIPTION - LOCATION: LOCATION: SHOULDER

## 2025-08-17 ASSESSMENT — PAIN SCALES - GENERAL: PAINLEVEL_OUTOF10: 8

## 2025-08-18 LAB
EKG ATRIAL RATE: 73 BPM
EKG DIAGNOSIS: NORMAL
EKG P AXIS: 71 DEGREES
EKG P-R INTERVAL: 168 MS
EKG Q-T INTERVAL: 352 MS
EKG QRS DURATION: 76 MS
EKG QTC CALCULATION (BAZETT): 387 MS
EKG R AXIS: 36 DEGREES
EKG T AXIS: 60 DEGREES
EKG VENTRICULAR RATE: 73 BPM

## 2025-08-18 PROCEDURE — 93010 ELECTROCARDIOGRAM REPORT: CPT | Performed by: INTERNAL MEDICINE

## (undated) DEVICE — CATHETER DIAG 5FR L100CM LUMN ID0.047IN JL3.5 CRV 0 SIDE H

## (undated) DEVICE — VALVE IV REFLX W/ M/F LUER LCK UNIV CAP STRL DISP

## (undated) DEVICE — TR BAND RADIAL ARTERY COMPRESSION DEVICE: Brand: TR BAND

## (undated) DEVICE — HI-TORQUE VERSACORE MODIFIED J GUIDE WIRE SYSTEM 145 CM: Brand: HI-TORQUE VERSACORE

## (undated) DEVICE — KENDALL DL ECG DUAL CONNECT RADIOLUCENT LEAD WIRES, 5-LEAD, SINGLE PATIENT USE: Brand: KENDALL

## (undated) DEVICE — ARGO BAGZ FLUID MANAGEMENT SYSTEM: Brand: ARGO BAGZ FLUID MANAGEMENT SYSTEM

## (undated) DEVICE — GLIDESHEATH SLENDER STAINLESS STEEL KIT: Brand: GLIDESHEATH SLENDER

## (undated) DEVICE — Device: Brand: OMNIWIRE PRESSURE GUIDE WIRE

## (undated) DEVICE — VALVE ANGIO ID0.11IN HEMSTAT MTL GUID WIRE INSRT TOOL AND

## (undated) DEVICE — TUBING PRSS MON L12IN PVC RIG NONEXPANDING M TO FEM CONN

## (undated) DEVICE — GUIDEWIRE VASC L180CM DIA0.035IN 3MM PTFE J TIP EXCHG FIX

## (undated) DEVICE — CATHETER DIAG 5FR L100CM LUMN ID0.047IN JR4 CRV 0 SIDE H

## (undated) DEVICE — SUPPORT WRST AD W3.5XL9IN DIA14.5IN ART SFT ADJ HK AND LOOP

## (undated) DEVICE — CATHETER GUID 5FR DIA0.058IN SHFT NYL STD JL 4 CRV ENH VIS

## (undated) DEVICE — HEART CATH-SFMC: Brand: MEDLINE INDUSTRIES, INC.

## (undated) DEVICE — COVER US PRB W15XL120CM W/ GEL RUBBERBAND TAPE STRP FLD GEN

## (undated) DEVICE — CATHETER GUID 6FR L100CM DIA0.071IN NYL SHFT JL3.5 W/O SIDE